# Patient Record
Sex: FEMALE | Race: BLACK OR AFRICAN AMERICAN | NOT HISPANIC OR LATINO | Employment: OTHER | URBAN - METROPOLITAN AREA
[De-identification: names, ages, dates, MRNs, and addresses within clinical notes are randomized per-mention and may not be internally consistent; named-entity substitution may affect disease eponyms.]

---

## 2017-07-04 LAB
ALBUMIN SERPL-MCNC: 3.9 G/DL (ref 3.5–5.5)
ALBUMIN/GLOB SERPL: 1.3 {RATIO} (ref 1.2–2.2)
ALP SERPL-CCNC: 82 IU/L (ref 39–117)
ALT SERPL-CCNC: 19 IU/L (ref 0–32)
AST SERPL-CCNC: 12 IU/L (ref 0–40)
BILIRUB SERPL-MCNC: 0.4 MG/DL (ref 0–1.2)
BUN SERPL-MCNC: 15 MG/DL (ref 6–24)
BUN/CREAT SERPL: 13 (ref 9–23)
CALCIUM SERPL-MCNC: 9.2 MG/DL (ref 8.7–10.2)
CHLORIDE SERPL-SCNC: 92 MMOL/L (ref 96–106)
CHOLEST SERPL-MCNC: 134 MG/DL (ref 100–199)
CO2 SERPL-SCNC: 26 MMOL/L (ref 18–29)
COMMENT 011824: ABNORMAL
CREAT SERPL-MCNC: 1.16 MG/DL (ref 0.57–1)
GLOBULIN SER CALC-MCNC: 3 G/DL (ref 1.5–4.5)
GLUCOSE SERPL-MCNC: 421 MG/DL (ref 65–99)
HDLC SERPL-MCNC: 39 MG/DL
LDLC SERPL CALC-MCNC: 56 MG/DL (ref 0–99)
POTASSIUM SERPL-SCNC: 4.1 MMOL/L (ref 3.5–5.2)
PROT SERPL-MCNC: 6.9 G/DL (ref 6–8.5)
SODIUM SERPL-SCNC: 136 MMOL/L (ref 134–144)
TRIGL SERPL-MCNC: 193 MG/DL (ref 0–149)
VLDLC SERPL CALC-MCNC: 39 MG/DL (ref 5–40)

## 2017-07-24 ENCOUNTER — TELEPHONE (OUTPATIENT)
Dept: CARDIOLOGY | Facility: MEDICAL CENTER | Age: 57
End: 2017-07-24

## 2017-07-24 NOTE — TELEPHONE ENCOUNTER
----- Message from Ramsey Gill M.D. sent at 7/24/2017 10:24 AM PDT -----  Please make sure whoever is managing the patient's glucose nose about the elevation.

## 2017-07-24 NOTE — TELEPHONE ENCOUNTER
Left patient a voicemail with instructions to call the office for lab results (CMP/Lipid Profile 7/3/2017).    JV RN

## 2017-07-24 NOTE — TELEPHONE ENCOUNTER
Received a call back from the patient, advised her of lab results and Dr. Gill's note. Patient states that her diabetes is being managed by her PCP (Paloma LEHMAN).     Lab report faxed to the patient's PCP's office at fax # [phone].    JV AYOUB

## 2017-08-22 ENCOUNTER — OFFICE VISIT (OUTPATIENT)
Dept: CARDIOLOGY | Facility: MEDICAL CENTER | Age: 57
End: 2017-08-22
Payer: MEDICARE

## 2017-08-22 VITALS
OXYGEN SATURATION: 95 % | DIASTOLIC BLOOD PRESSURE: 72 MMHG | BODY MASS INDEX: 43.63 KG/M2 | SYSTOLIC BLOOD PRESSURE: 112 MMHG | HEART RATE: 74 BPM | WEIGHT: 278 LBS | HEIGHT: 67 IN

## 2017-08-22 DIAGNOSIS — E78.5 DYSLIPIDEMIA: ICD-10-CM

## 2017-08-22 DIAGNOSIS — I10 ESSENTIAL HYPERTENSION: ICD-10-CM

## 2017-08-22 DIAGNOSIS — R00.2 PALPITATIONS: ICD-10-CM

## 2017-08-22 DIAGNOSIS — R06.83 SNORES: ICD-10-CM

## 2017-08-22 PROCEDURE — 99214 OFFICE O/P EST MOD 30 MIN: CPT | Performed by: INTERNAL MEDICINE

## 2017-08-22 RX ORDER — GLIPIZIDE 10 MG/1
10 TABLET ORAL DAILY
COMMUNITY

## 2017-08-22 NOTE — Clinical Note
[facility]   [address], [facility] [address]  [place] [address]  Phone: [phone]  Fax: [phone]              Asha Younger  1960    Encounter Date: 8/22/2017    Ramsey Gill M.D.          PROGRESS NOTE:  Subjective:   Asha Younger is a 56 y.o. female who presents today for followup for hypertension and edema. She also has hyperlipidemia and a history of a hypercoagulable state. She is on chronic anticoagulation therapy.    Her blood pressure is under better control. At home it is running approximately 120/70-80.     She's had no chest discomfort, dyspnea on exertion or palpitations. She does have some difficulty with fatigue and does snore intermittently. She has had some edema which is predominantly dependent nature. She's had no recurrent palpitations.    Past Medical History   Diagnosis Date   • Hypercoagulable state (CMS-HCC) 10/11/2011   • DVT (deep vein thrombosis) in pregnancy 10/11/2011   • Diabetes mellitus type 2 in obese (CMS-HCC) 10/11/2011   • HTN (hypertension) 10/11/2011   • GERD (gastroesophageal reflux disease) 10/11/2011   • Edema 10/11/2011   • DVT (deep venous thrombosis) (CMS-HCC) 10/11/2011   • Palpitations 12/21/2011   • Hyperlipemia 1/22/2013     Past Surgical History   Procedure Laterality Date   • Oophorectomy  1984     RIGHT   • Ventral hernia repair       Family History   Problem Relation Age of Onset   • Clotting Disorder Mother    • Clotting Disorder Sister      History   Smoking status   • Current Every Day Smoker -- 0.25 packs/day for 20 years   • Types: Cigarettes   Smokeless tobacco   • Never Used     Allergies   Allergen Reactions   • Erythromycin      Outpatient Encounter Prescriptions as of 8/22/2017   Medication Sig Dispense Refill   • glipiZIDE (GLUCOTROL) 10 MG Tab Take 10 mg by mouth every day.     • Coenzyme Q10 (CO Q 10 PO) Take  by mouth.     • carvedilol (COREG) 25 MG Tab Take 2 Tabs by mouth 2 times a  "day, with meals. 120 Tab 11   • atorvastatin (LIPITOR) 20 MG TABS Take 1 Tab by mouth every evening. 90 Tab 1   • hydrochlorothiazide (HYDRODIURIL) 50 MG TABS Take 50 mg by mouth every day.     • potassium chloride SA (K-DUR) 20 MEQ TBCR Take 20 mEq by mouth as needed.     • fluticasone (FLONASE) 50 MCG/ACT nasal spray Spray 1 Spray in nose every day. Each Nostril     • albuterol (PROAIR HFA) 108 (90 BASE) MCG/ACT AERS Inhale 2 Puffs by mouth every 6 hours as needed.     • warfarin (COUMADIN) 4 MG TABS Take 4 mg by mouth every day.     • hydrocodone-acetaminophen (NORCO) 5-325 MG TABS per tablet Take 1-2 Tabs by mouth every four hours as needed.     • furosemide (LASIX) 40 MG TABS Take 1 Tab by mouth as needed. 10 Tab 3   • sitagliptin (JANUVIA) 100 MG TABS Take 100 mg by mouth every day.     • magnesium oxide (MAG-OX) 400 MG TABS Take 400 mg by mouth 2 times a day.     • metformin SR (GLUCOPHAGE XR) 500 MG TB24 Take 500 mg by mouth 2 times a day.     • lisinopril (PRINIVIL) 20 MG TABS Take 40 mg by mouth every day.       No facility-administered encounter medications on file as of 8/22/2017.     ROS       Objective:   /72 mmHg  Pulse 74  Ht 1.702 m (5' 7\")  Wt 126.1 kg (278 lb)  BMI 43.53 kg/m2  SpO2 95%    Physical Exam   Neck: No JVD present.   Cardiovascular: Normal rate.  An irregular rhythm present. Exam reveals no gallop.    No murmur heard.  Pulmonary/Chest: Effort normal. She has no rales.   Abdominal: Soft. There is no tenderness.   Musculoskeletal: She exhibits edema (trace pretibial).     Lab Results   Component Value Date/Time    CHOLESTEROL, 07/03/2017 08:34 AM    LDL 56 07/03/2017 08:34 AM    HDL 39* 07/03/2017 08:34 AM    TRIGLYCERIDES 193* 07/03/2017 08:34 AM       Lab Results   Component Value Date/Time    SODIUM 136 07/03/2017 08:34 AM    POTASSIUM 4.1 07/03/2017 08:34 AM    CHLORIDE 92* 07/03/2017 08:34 AM    CO2 26 07/03/2017 08:34 AM    GLUCOSE 421* 07/03/2017 08:34 AM    BUN " 15 07/03/2017 08:34 AM    CREATININE 1.16* 07/03/2017 08:34 AM    BUN-CREATININE RATIO 13 07/03/2017 08:34 AM     Lab Results   Component Value Date/Time    ALKALINE PHOSPHATASE 82 07/03/2017 08:34 AM    AST(SGOT) 12 07/03/2017 08:34 AM    ALT(SGPT) 19 07/03/2017 08:34 AM    TOTAL BILIRUBIN 0.4 07/03/2017 08:34 AM      No results found for: BNPBTYPENAT       Assessment:     1. Essential hypertension     2. Palpitations     3. Dyslipidemia         Medical Decision Making:  Today's Assessment / Status / Plan:     Ms. Younger is clinically stable. Her blood pressure appears to be under excellent control. Her lipid status is also under good control on a moderate dose of atorvastatin. We did try to increase this to 40 mg but this caused muscle cramping. We will continue her on her current dosage. She's had a difficulty with palpitations. Given her fatigue and snoring, we will further evaluate her with an overnight pulse oximetry to rule out nocturnal hypoxemia. She will otherwise follow-up in about 6 months.      Dorota Ball, A.P.N.  [address] [facility] [address]  VIA Facsimile: [phone]

## 2017-08-22 NOTE — PROGRESS NOTES
Subjective:   Asha Younger is a 56 y.o. female who presents today for followup for hypertension and edema. She also has hyperlipidemia and a history of a hypercoagulable state. She is on chronic anticoagulation therapy.    Her blood pressure is under better control. At home it is running approximately 120/70-80.     She's had no chest discomfort, dyspnea on exertion or palpitations. She does have some difficulty with fatigue and does snore intermittently. She has had some edema which is predominantly dependent nature. She's had no recurrent palpitations.    Past Medical History   Diagnosis Date   • Hypercoagulable state (CMS-HCC) 10/11/2011   • DVT (deep vein thrombosis) in pregnancy 10/11/2011   • Diabetes mellitus type 2 in obese (CMS-HCC) 10/11/2011   • HTN (hypertension) 10/11/2011   • GERD (gastroesophageal reflux disease) 10/11/2011   • Edema 10/11/2011   • DVT (deep venous thrombosis) (CMS-HCC) 10/11/2011   • Palpitations 12/21/2011   • Hyperlipemia 1/22/2013     Past Surgical History   Procedure Laterality Date   • Oophorectomy  1984     RIGHT   • Ventral hernia repair       Family History   Problem Relation Age of Onset   • Clotting Disorder Mother    • Clotting Disorder Sister      History   Smoking status   • Current Every Day Smoker -- 0.25 packs/day for 20 years   • Types: Cigarettes   Smokeless tobacco   • Never Used     Allergies   Allergen Reactions   • Erythromycin      Outpatient Encounter Prescriptions as of 8/22/2017   Medication Sig Dispense Refill   • glipiZIDE (GLUCOTROL) 10 MG Tab Take 10 mg by mouth every day.     • Coenzyme Q10 (CO Q 10 PO) Take  by mouth.     • carvedilol (COREG) 25 MG Tab Take 2 Tabs by mouth 2 times a day, with meals. 120 Tab 11   • atorvastatin (LIPITOR) 20 MG TABS Take 1 Tab by mouth every evening. 90 Tab 1   • hydrochlorothiazide (HYDRODIURIL) 50 MG TABS Take 50 mg by mouth every day.     • potassium chloride SA (K-DUR) 20 MEQ TBCR Take 20 mEq by mouth as  "needed.     • fluticasone (FLONASE) 50 MCG/ACT nasal spray Spray 1 Spray in nose every day. Each Nostril     • albuterol (PROAIR HFA) 108 (90 BASE) MCG/ACT AERS Inhale 2 Puffs by mouth every 6 hours as needed.     • warfarin (COUMADIN) 4 MG TABS Take 4 mg by mouth every day.     • hydrocodone-acetaminophen (NORCO) 5-325 MG TABS per tablet Take 1-2 Tabs by mouth every four hours as needed.     • furosemide (LASIX) 40 MG TABS Take 1 Tab by mouth as needed. 10 Tab 3   • sitagliptin (JANUVIA) 100 MG TABS Take 100 mg by mouth every day.     • magnesium oxide (MAG-OX) 400 MG TABS Take 400 mg by mouth 2 times a day.     • metformin SR (GLUCOPHAGE XR) 500 MG TB24 Take 500 mg by mouth 2 times a day.     • lisinopril (PRINIVIL) 20 MG TABS Take 40 mg by mouth every day.       No facility-administered encounter medications on file as of 8/22/2017.     ROS       Objective:   /72 mmHg  Pulse 74  Ht 1.702 m (5' 7\")  Wt 126.1 kg (278 lb)  BMI 43.53 kg/m2  SpO2 95%    Physical Exam   Neck: No JVD present.   Cardiovascular: Normal rate.  An irregular rhythm present. Exam reveals no gallop.    No murmur heard.  Pulmonary/Chest: Effort normal. She has no rales.   Abdominal: Soft. There is no tenderness.   Musculoskeletal: She exhibits edema (trace pretibial).     Lab Results   Component Value Date/Time    CHOLESTEROL, 07/03/2017 08:34 AM    LDL 56 07/03/2017 08:34 AM    HDL 39* 07/03/2017 08:34 AM    TRIGLYCERIDES 193* 07/03/2017 08:34 AM       Lab Results   Component Value Date/Time    SODIUM 136 07/03/2017 08:34 AM    POTASSIUM 4.1 07/03/2017 08:34 AM    CHLORIDE 92* 07/03/2017 08:34 AM    CO2 26 07/03/2017 08:34 AM    GLUCOSE 421* 07/03/2017 08:34 AM    BUN 15 07/03/2017 08:34 AM    CREATININE 1.16* 07/03/2017 08:34 AM    BUN-CREATININE RATIO 13 07/03/2017 08:34 AM     Lab Results   Component Value Date/Time    ALKALINE PHOSPHATASE 82 07/03/2017 08:34 AM    AST(SGOT) 12 07/03/2017 08:34 AM    ALT(SGPT) 19 " 07/03/2017 08:34 AM    TOTAL BILIRUBIN 0.4 07/03/2017 08:34 AM      No results found for: BNPBTYPENAT       Assessment:     1. Essential hypertension     2. Palpitations     3. Dyslipidemia         Medical Decision Making:  Today's Assessment / Status / Plan:     Ms. Younger is clinically stable. Her blood pressure appears to be under excellent control. Her lipid status is also under good control on a moderate dose of atorvastatin. We did try to increase this to 40 mg but this caused muscle cramping. We will continue her on her current dosage. She's had a difficulty with palpitations. Given her fatigue and snoring, we will further evaluate her with an overnight pulse oximetry to rule out nocturnal hypoxemia. She will otherwise follow-up in about 6 months.

## 2017-08-22 NOTE — MR AVS SNAPSHOT
"        Asha Younger   2017 2:45 PM   Office Visit   MRN: [id number]    Department:  [facility]   Dept Phone:  [phone]    Description:  Female : 1960   Provider:  Ramsey Gill M.D.           Reason for Visit     Follow-Up           Allergies as of 2017     Allergen Noted Reactions    Erythromycin 10/11/2011         You were diagnosed with     Essential hypertension   [8247057]       Palpitations   [785.1.ICD-9-CM]       Dyslipidemia   [060507]       Snores   [059871]         Vital Signs     Blood Pressure Pulse Height Weight Body Mass Index Oxygen Saturation    112/72 mmHg 74 1.702 m (5' 7\") 126.1 kg (278 lb) 43.53 kg/m2 95%    Smoking Status                   Current Every Day Smoker           Basic Information     Date Of Birth Sex Race Ethnicity Preferred Language    1960 Female Black or  Non- English      Problem List              ICD-10-CM Priority Class Noted - Resolved    Hypercoagulable state (CMS-HCC) D68.59   10/11/2011 - Present    Diabetes mellitus type 2 in obese (CMS-HCC) E11.9, E66.9   10/11/2011 - Present    Essential hypertension I10   10/11/2011 - Present    GERD (gastroesophageal reflux disease) K21.9   10/11/2011 - Present    Edema R60.9   10/11/2011 - Present    DVT (deep venous thrombosis) (CMS-HCC) I82.409   10/11/2011 - Present    Palpitations R00.2   2011 - Present    Dyslipidemia E78.5   2013 - Present      Health Maintenance        Date Due Completion Dates    IMM HEP B VACCINE (1 of 3 - Primary Series) 1960 ---    A1C SCREENING 1960 ---    DIABETES MONOFILAMENT / LE EXAM 1960 ---    RETINAL SCREENING 1977 ---    URINE ACR / MICROALBUMIN 1977 ---    IMM DTaP/Tdap/Td Vaccine (1 - Tdap) 1978 ---    IMM PNEUMOCOCCAL 19-64 (ADULT) MEDIUM RISK SERIES (1 of 1 - PPSV23) 1978 ---    PAP SMEAR 1980 ---    MAMMOGRAM 1999 ---    COLONOSCOPY 2009 ---    IMM INFLUENZA (1) " 9/1/2017 ---    FASTING LIPID PROFILE 7/3/2018 7/3/2017, 7/1/2016, 5/9/2015, 12/31/2014, 2/4/2014, 1/8/2013    SERUM CREATININE 7/3/2018 7/3/2017, 7/1/2016, 5/9/2015, 12/31/2014, 4/20/2014, 2/4/2014, 1/8/2013, 7/31/2007            Current Immunizations     No immunizations on file.      Below and/or attached are the medications your provider expects you to take. Review all of your home medications and newly ordered medications with your provider and/or pharmacist. Follow medication instructions as directed by your provider and/or pharmacist. Please keep your medication list with you and share with your provider. Update the information when medications are discontinued, doses are changed, or new medications (including over-the-counter products) are added; and carry medication information at all times in the event of emergency situations     Allergies:  ERYTHROMYCIN - (reactions not documented)               Medications  Valid as of: August 22, 2017 -  3:34 PM    Generic Name Brand Name Tablet Size Instructions for use    Albuterol Sulfate (Aero Soln) albuterol 108 (90 Base) MCG/ACT Inhale 2 Puffs by mouth every 6 hours as needed.        Atorvastatin Calcium (Tab) LIPITOR 20 MG Take 1 Tab by mouth every evening.        Carvedilol (Tab) COREG 25 MG Take 2 Tabs by mouth 2 times a day, with meals.        Coenzyme Q10   Take  by mouth.        Fluticasone Propionate (Suspension) FLONASE 50 MCG/ACT Spray 1 Spray in nose every day. Each Nostril        Furosemide (Tab) LASIX 40 MG Take 1 Tab by mouth as needed.        GlipiZIDE (Tab) GLUCOTROL 10 MG Take 10 mg by mouth every day.        HydroCHLOROthiazide (Tab) HYDRODIURIL 50 MG Take 50 mg by mouth every day.        Hydrocodone-Acetaminophen (Tab) NORCO 5-325 MG Take 1-2 Tabs by mouth every four hours as needed.        Lisinopril (Tab) PRINIVIL 20 MG Take 40 mg by mouth every day.        Magnesium Oxide (Tab) MAG- MG Take 400 mg by mouth 2 times a day.        MetFORMIN  HCl (TABLET SR 24 HR) GLUCOPHAGE  MG Take 500 mg by mouth 2 times a day.        Potassium Chloride Rubi CR (Tab CR) Kdur 20 MEQ Take 20 mEq by mouth as needed.        SITagliptin Phosphate (Tab) JANUVIA 100 MG Take 100 mg by mouth every day.        Warfarin Sodium (Tab) COUMADIN 4 MG Take 4 mg by mouth every day.        .                 Medicines prescribed today were sent to:     [id number] - [place], [place] - [id number] [facility] [place] [address]    Phone: [phone] Fax: [phone]    Open 24 Hours?: No      Medication refill instructions:       If your prescription bottle indicates you have medication refills left, it is not necessary to call your provider’s office. Please contact your pharmacy and they will refill your medication.    If your prescription bottle indicates you do not have any refills left, you may request refills at any time through one of the following ways: The online [employer] system (except Urgent Care), by calling your provider’s office, or by asking your pharmacy to contact your provider’s office with a refill request. Medication refills are processed only during regular business hours and may not be available until the next business day. Your provider may request additional information or to have a follow-up visit with you prior to refilling your medication.   *Please Note: Medication refills are assigned a new Rx number when refilled electronically. Your pharmacy may indicate that no refills were authorized even though a new prescription for the same medication is available at the pharmacy. Please request the medicine by name with the pharmacy before contacting your provider for a refill.           [employer] Access Code: [email or web address]  Expires: 9/21/2017  3:34 PM    [employer]  A secure, online tool to manage your health information     [employer]’s [employer]rett® is a secure, online tool that connects you to your personalized health information from the privacy  of your home -- day or night - making it very easy for you to manage your healthcare. Once the activation process is completed, you can even access your medical information using the [facility] crystal, which is available for free in the Apple Crystal store or Google Play store.     [facility] provides the following levels of access (as shown below):   My Chart Features   [facility] Doctor [facility]  Specialists [facility]  Urgent  Care Non-[facility]  Primary Care  Doctor   Email your healthcare team securely and privately 24/7 X X X    Manage appointments: schedule your next appointment; view details of past/upcoming appointments X      Request prescription refills. X      View recent personal medical records, including lab and immunizations X X X X   View health record, including health history, allergies, medications X X X X   Read reports about your outpatient visits, procedures, consult and ER notes X X X X   See your discharge summary, which is a recap of your hospital and/or ER visit that includes your diagnosis, lab results, and care plan. X X       How to register for [employer]:  1. Go to  [email or web address].  2. Click on the Sign Up Now box, which takes you to the New Member Sign Up page. You will need to provide the following information:  a. Enter your [employer] Access Code exactly as it appears at the top of this page. (You will not need to use this code after you’ve completed the sign-up process. If you do not sign up before the expiration date, you must request a new code.)   b. Enter your date of birth.   c. Enter your home email address.   d. Click Submit, and follow the next screen’s instructions.  3. Create a [facility] ID. This will be your [employer] login ID and cannot be changed, so think of one that is secure and easy to remember.  4. Create a [employer] password. You can change your password at any time.  5. Enter your Password Reset Question and Answer. This can be used at a later time if you forget your  password.   6. Enter your e-mail address. This allows you to receive e-mail notifications when new information is available in [employer].  7. Click Sign Up. You can now view your health information.    For assistance activating your [employer] account, call [phone]        Quit Tobacco Information     Do you want to quit using tobacco?    Quitting tobacco decreases risks of cancer, heart and lung disease, increases life expectancy, improves sense of taste and smell, and increases spending money, among other benefits.    If you are thinking about quitting, we can help.  • [facility] Quit Tobacco Program: 938.326.9686  o Program occurs weekly for four weeks and includes pharmacist consultation on products to support quitting smoking or chewing tobacco. A provider referral is needed for pharmacist consultation.  • Tobacco Users Help Hotline: 3-168-QUIT-NOW (893-1438) or https://[place].quitlogix.org/  o Free, confidential telephone and online coaching for [place] residents. Sessions are designed on a schedule that is convenient for you. Eligible clients receive free nicotine replacement therapy.  • Nationally: www.smokefree.gov  o Information and professional assistance to support both immediate and long-term needs as you become, and remain, a non-smoker. Smokefree.gov allows you to choose the help that best fits your needs.

## 2017-08-28 ENCOUNTER — TELEPHONE (OUTPATIENT)
Dept: CARDIOLOGY | Facility: MEDICAL CENTER | Age: 57
End: 2017-08-28

## 2017-09-15 ENCOUNTER — TELEPHONE (OUTPATIENT)
Dept: CARDIOLOGY | Facility: MEDICAL CENTER | Age: 57
End: 2017-09-15

## 2017-09-15 DIAGNOSIS — R53.83 FATIGUE, UNSPECIFIED TYPE: ICD-10-CM

## 2017-09-15 DIAGNOSIS — G47.34 NOCTURNAL HYPOXIA: Primary | ICD-10-CM

## 2017-09-15 DIAGNOSIS — R06.83 SNORING: ICD-10-CM

## 2017-09-15 NOTE — TELEPHONE ENCOUNTER
Referral to pulmonology initiated. Left patient a voicemail with instructions to call the office regarding her OPO result and Dr. Gill's recommendation.    JV AYOUB

## 2017-09-15 NOTE — TELEPHONE ENCOUNTER
Called patient again, advised her of abnormal OPO result and Dr. Gill's recommendation. Patient verbalized understanding and agrees with plan.    Will contact patient once referral is completed.    JV AYOUB

## 2017-12-19 ENCOUNTER — SLEEP CENTER VISIT (OUTPATIENT)
Dept: SLEEP MEDICINE | Facility: MEDICAL CENTER | Age: 57
End: 2017-12-19
Payer: MEDICARE

## 2017-12-19 VITALS
HEART RATE: 80 BPM | WEIGHT: 272 LBS | SYSTOLIC BLOOD PRESSURE: 132 MMHG | HEIGHT: 67 IN | OXYGEN SATURATION: 92 % | DIASTOLIC BLOOD PRESSURE: 80 MMHG | BODY MASS INDEX: 42.69 KG/M2

## 2017-12-19 DIAGNOSIS — I10 ESSENTIAL HYPERTENSION: ICD-10-CM

## 2017-12-19 DIAGNOSIS — E66.9 DIABETES MELLITUS TYPE 2 IN OBESE (HCC): ICD-10-CM

## 2017-12-19 DIAGNOSIS — R00.2 PALPITATIONS: ICD-10-CM

## 2017-12-19 DIAGNOSIS — G47.30 SLEEP DISORDER BREATHING: ICD-10-CM

## 2017-12-19 DIAGNOSIS — E11.69 DIABETES MELLITUS TYPE 2 IN OBESE (HCC): ICD-10-CM

## 2017-12-19 DIAGNOSIS — D68.59 HYPERCOAGULABLE STATE (HCC): ICD-10-CM

## 2017-12-19 DIAGNOSIS — Z71.6 TOBACCO ABUSE COUNSELING: ICD-10-CM

## 2017-12-19 PROCEDURE — 99204 OFFICE O/P NEW MOD 45 MIN: CPT | Performed by: FAMILY MEDICINE

## 2017-12-19 RX ORDER — HYDROCHLOROTHIAZIDE 25 MG/1
25 TABLET ORAL 2 TIMES DAILY
COMMUNITY
Start: 2017-10-16

## 2017-12-19 RX ORDER — ATORVASTATIN CALCIUM 10 MG/1
TABLET, FILM COATED ORAL
COMMUNITY
Start: 2017-10-16 | End: 2018-05-21

## 2017-12-19 RX ORDER — LISINOPRIL 40 MG/1
40 TABLET ORAL DAILY
COMMUNITY
Start: 2017-09-21

## 2017-12-19 RX ORDER — WARFARIN SODIUM 5 MG/1
TABLET ORAL
COMMUNITY
Start: 2017-12-16 | End: 2019-04-20

## 2017-12-19 RX ORDER — OMEPRAZOLE 20 MG/1
20 CAPSULE, DELAYED RELEASE ORAL
COMMUNITY
Start: 2017-12-15 | End: 2019-03-26

## 2017-12-19 RX ORDER — PREDNISONE 20 MG/1
TABLET ORAL
COMMUNITY
Start: 2017-10-31 | End: 2018-05-22

## 2017-12-19 RX ORDER — AMOXICILLIN AND CLAVULANATE POTASSIUM 875; 125 MG/1; MG/1
TABLET, FILM COATED ORAL
COMMUNITY
Start: 2017-12-15 | End: 2018-05-22

## 2017-12-19 RX ORDER — POLYETHYLENE GLYCOL 3350 17 G/17G
POWDER, FOR SOLUTION ORAL
COMMUNITY
Start: 2017-10-31 | End: 2019-03-26

## 2017-12-19 RX ORDER — ZOLPIDEM TARTRATE 5 MG/1
5 TABLET ORAL NIGHTLY PRN
Qty: 2 TAB | Refills: 0 | Status: SHIPPED | OUTPATIENT
Start: 2017-12-19 | End: 2017-12-20

## 2017-12-19 NOTE — PROGRESS NOTES
"     [facility]  Consult Note     Date: 12/19/2017 / Time: 1:42 PM    Patient ID:   Name:             Asha Younger   YOB: 1960  Age:                 57 y.o.  female   MRN:               [id number]      Thank you for requesting a sleep medicine consultation on Asha Younger at the sleep center. She presents today with the chief complaints of snore and  occasional excessive daytime sleepiness (EDS). She is referred by Dr. Sanchez for evaluation and treatment of sleep disorder breathing.     HISTORY OF PRESENT ILLNESS:       At night,  Asha Younger goes to bed around 9-10 pm on weekdays and around on weekends. She gets out of bed at 5-7 am on weekdays and on weekends.She  averages 6.5 hrs of sleep on a good night and 4 hrs on a bad night. Pt has bad nights 2-3 nights per week. She falls asleep within 20 minutes. She awakens 2 times a night due to bathroom use. It takes her 5-10 min to fall back asleep.Some times it can take 1-2 hours but it is rear. During that time she watches TV or read.     She is aware of snoring denies witnessed apneas/gasping or choking in sleep.  She  denies any symptoms of restless legs syndrome such as an \"urge to move\"  She  legs in the evening or bedtime. She  denies any symptoms of narcolepsy such as sleep paralysis or cataplexy, or any symptoms to suggest parasomnias such as sleep walking or acting out of dreams. She  has not used any medications for her sleep problem.    Overall,  She doesnot finds her sleep refreshing. When She  wakes up in the morning, She  does does feel tired.Steele sleepiness scale score is normal at  2/24.   She doesnot take regular naps.     She is current smoker. She smoked 5 cigarettes per day for last 25 years. Denies COPD and Asthma . She uses albuterol inh PRN.   REVIEW OF SYSTEMS:       Constitutional: Denies fevers, Denies weight changes  Eyes: Denies changes in vision, no eye pain  Ears/Nose/Throat/Mouth: cough, " congestion, rhinorrhea  Cardiovascular: Denies chest pain or palpitations   Respiratory: Denies shortness of breath , Denies cough  Gastrointestinal/Hepatic: Denies abdominal pain, nausea, vomiting, diarrhea, constipation or GI bleeding   Genitourinary: Denies bladder dysfunction, dysuria or frequency  Musculoskeletal/Rheum: Denies  joint pain and swelling   Skin/Breast: Denies rash, denies breast lumps or discharge  Neurological: Denies headache, confusion, memory loss or focal weakness/parasthesias  Psychiatric: denies mood disorder     Comprehensive review of systems form is reviewed with the patient and is attached in the EMR.     PMH:  has a past medical history of Bronchitis; Diabetes mellitus type 2 in obese (CMS-HCC) (10/11/2011); DVT (deep vein thrombosis) in pregnancy (CMS-HCC) (10/11/2011); DVT (deep venous thrombosis) (CMS-HCC) (10/11/2011); Edema (10/11/2011); GERD (gastroesophageal reflux disease) (10/11/2011); [place] measles; HTN (hypertension) (10/11/2011); Hypercoagulable state (CMS-HCC) (10/11/2011); Hyperlipemia (1/22/2013); Hypertension; Nasal drainage; Obesity; Palpitations (12/21/2011); and Sickle cell trait (CMS-HCC).  MEDS:   Current Outpatient Prescriptions:   •  warfarin (COUMADIN) 5 MG Tab, , Disp: , Rfl:   •  omeprazole (PRILOSEC) 20 MG delayed-release capsule, , Disp: , Rfl:   •  amoxicillin-clavulanate (AUGMENTIN) 875-125 MG Tab, , Disp: , Rfl:   •  predniSONE (DELTASONE) 20 MG Tab, , Disp: , Rfl:   •  polyethylene glycol 3350 (MIRALAX) Powder, , Disp: , Rfl:   •  metformin (GLUCOPHAGE) 1000 MG tablet, , Disp: , Rfl:   •  hydrochlorothiazide (HYDRODIURIL) 25 MG Tab, , Disp: , Rfl:   •  atorvastatin (LIPITOR) 10 MG Tab, , Disp: , Rfl:   •  lisinopril (PRINIVIL, ZESTRIL) 40 MG tablet, , Disp: , Rfl:   •  CHERATUSSIN AC Syrup oral solution, TAKE 10ML BY MOUTH EVERY 4 HOURS NOT COVERED, Disp: , Rfl: 0  •  glipiZIDE (GLUCOTROL) 10 MG Tab, Take 10 mg by mouth every day., Disp: , Rfl:   •   Coenzyme Q10 (CO Q 10 PO), Take  by mouth., Disp: , Rfl:   •  carvedilol (COREG) 25 MG Tab, Take 2 Tabs by mouth 2 times a day, with meals., Disp: 120 Tab, Rfl: 11  •  atorvastatin (LIPITOR) 20 MG TABS, Take 1 Tab by mouth every evening., Disp: 90 Tab, Rfl: 1  •  hydrochlorothiazide (HYDRODIURIL) 50 MG TABS, Take 50 mg by mouth every day., Disp: , Rfl:   •  potassium chloride SA (K-DUR) 20 MEQ TBCR, Take 20 mEq by mouth as needed., Disp: , Rfl:   •  fluticasone (FLONASE) 50 MCG/ACT nasal spray, Spray 1 Spray in nose every day. Each Nostril, Disp: , Rfl:   •  albuterol (PROAIR HFA) 108 (90 BASE) MCG/ACT AERS, Inhale 2 Puffs by mouth every 6 hours as needed., Disp: , Rfl:   •  warfarin (COUMADIN) 4 MG TABS, Take 4 mg by mouth every day., Disp: , Rfl:   •  hydrocodone-acetaminophen (NORCO) 5-325 MG TABS per tablet, Take 1-2 Tabs by mouth every four hours as needed., Disp: , Rfl:   •  furosemide (LASIX) 40 MG TABS, Take 1 Tab by mouth as needed., Disp: 10 Tab, Rfl: 3  •  sitagliptin (JANUVIA) 100 MG TABS, Take 100 mg by mouth every day., Disp: , Rfl:   •  magnesium oxide (MAG-OX) 400 MG TABS, Take 400 mg by mouth 2 times a day., Disp: , Rfl:   •  metformin SR (GLUCOPHAGE XR) 500 MG TB24, Take 500 mg by mouth 2 times a day., Disp: , Rfl:   •  lisinopril (PRINIVIL) 20 MG TABS, Take 40 mg by mouth every day., Disp: , Rfl:   ALLERGIES:   Allergies   Allergen Reactions   • Erythromycin      SURGHX:   Past Surgical History:   Procedure Laterality Date   • OOPHORECTOMY  1984    RIGHT   • LAPAROTOMY     • VENTRAL HERNIA REPAIR       SOCHX:  reports that she has been smoking Cigarettes.  She has a 5.00 pack-year smoking history. She has never used smokeless tobacco. She reports that she does not drink alcohol or use drugs..   FH:   Family History   Problem Relation Age of Onset   • Clotting Disorder Mother    • Kidney Disease Mother    • Clotting Disorder Sister    • Cancer Father    • Heart Failure Father            Physical  "Exam:  Vitals/ General Appearance:   Weight/BMI: Body mass index is 42.6 kg/m².  Blood pressure 132/80, pulse 80, height 1.702 m (5' 7\"), weight 123.4 kg (272 lb), SpO2 92 %.  Vitals:    12/19/17 1333   BP: 132/80   Pulse: 80   SpO2: 92%   Weight: 123.4 kg (272 lb)   Height: 1.702 m (5' 7\")       Pt. is alert and oriented to time, place and person. Cooperative and in no apparent distress.       1. Head: Atraumatic, normocephalic. There is no mandibular hypoplasia or maxillary over-jut.   2. Ears: Normal tympanic membrane and no discharge  3. Nose: No inferior turbinate hypertophy, no septal deviation, no polyp.   4. Throat: Oropharynx appears crowded in that the palate is overhanging (Malam Rosa Isela scale 4. Tonsils are not enlarged, uvula is large, pharynx not inflamed. Tongue is large. She has intact dentition and oral hygiene is fair.  5. Neck: Supple. No thyromegaly  6. Chest: Trachea central, no spine deformity   7. Lungs auscultation: B/L good air entry, mild BLT wheezing   8. Heart auscultation: 1st and 2nd heart sounds normal, regular rhythm. No appreciable murmur.  9. Abdomen: Soft, non tender, no organomegaly. Bowel sounds present  10. Extremities: No, no deformity, no clubbing, no pedal edema.   Peripheral pulses felt.  11. Skin: No rash  12. NEUROLOGICAL EXAMINATION: On neurological exam, the patient was alert and oriented x3. speech was clear and fluent without dysarthria.  Cranial nerve exam II through XII was normal. Motor exam revealed normal bulk, tone and strength 5/5, which was symmetrical in the upper and lower extremities bilaterally.  Deep tendon reflexes are 2+ and symmetric throughout.    INVESTIGATIONS:       ASSESSMENT AND PLAN     1. She  has symptoms of Obstructive Sleep Apnea (MISAEL). She  has excessive daytime sleepiness that interferes with activites of daily living. She  risk factors for MISAEL include obesity, thick neck, and crowded oropharynx.     The pathophysiology of MISAEL and the " increased risk of cardiovascular morbidity from untreated MISAEL is discussed in detail with the patient. She  also has HTN, DM heart and palpitatiosn which can be worsened by her MISAEL.     We have discussed diagnostic options including in-laboratory, attended polysomnography and home sleep testing. We have also discussed treatment options including airway pressurization, reconstructive otolaryngologic surgery, dental appliances and weight management.     Subsequently,treatment options will be discussed based on the diagnostic study. Meanwhile, She is urged to avoid supine sleep, weight gain and alcoholic beverages since all of these can worsen MISAEL. She is cautioned against drowsy driving. If She feels sleepy while driving, She must pull over for a break/nap, rather than persist on the road, in the interest of She own safety and that of others on the road.    Plan  - She  will be scheduled for an overnight PSG to assess sleep related breathing disorder.    2. Smoking Cessation: she is already slowly weaning off number of cigarettes that she smokes a day, She has tried nicotine gum in past. Treatment options including medication, nicotine patches and quit help line was discussed with the pt. She deferred help at this moment. She doesn't have a set quit date. 10 mins was spent discussing treatment options and counseling      3. Regarding treatment of other past medical problems and general health maintenance,  She is urged to follow up with PCP.    F/U after PSG

## 2018-02-10 ENCOUNTER — SLEEP STUDY (OUTPATIENT)
Dept: SLEEP MEDICINE | Facility: MEDICAL CENTER | Age: 58
End: 2018-02-10
Attending: FAMILY MEDICINE
Payer: MEDICARE

## 2018-02-10 DIAGNOSIS — D68.59 HYPERCOAGULABLE STATE (HCC): ICD-10-CM

## 2018-02-10 DIAGNOSIS — R00.2 PALPITATIONS: ICD-10-CM

## 2018-02-10 DIAGNOSIS — G47.30 SLEEP DISORDER BREATHING: ICD-10-CM

## 2018-02-10 PROCEDURE — 95811 POLYSOM 6/>YRS CPAP 4/> PARM: CPT | Performed by: INTERNAL MEDICINE

## 2018-02-12 NOTE — PROCEDURES
CLINICAL COMMENTS:  The patient underwent a split night polysomnogram with a CPAP titration using the standard montage for measurement of parameters of sleep, respiratory events, movement abnormalities, heart rate and rhythm. A microphone was used to monitor snoring.    ANALYSIS: Testing began at 10:15 PM.  The diagnostic recording time was 175.0 minutes with a sleep period of 136.5 minutes.  Total sleep time was 135.0 minutes with a sleep efficiency of 77.1%.  The sleep latency was 38.5 minutes, and REM latency was 58.5 minutes.  The patient had 12 arousals in total, for an arousal index of 5.3.        RESPIRATORY: The patient had 2 apneas in total.  Of these, 2 were obstructive apneas, and 0 were central apneas.  This resulted in an apnea index (AI) of 0.9.  The patient had 32 hypopneas in total, which resulted in a hypopnea index of 14.2.  The overall AHI was 15.1, while the AHI during REM was 80.0.  The supine AHI = 15.1.    OXIMETRY: Oxygen saturation monitoring showed a mean SpO2 of 87.5% for the diagnostic part of the study, with a minimum oxygen saturation of 75.0%.  Oxygen saturations were below 89% for 122.3% of sleep time.    CARDIAC: The highest heart rate for the first part of the study was 83.0 beats per minute.  The average heart rate during sleep was 64.9 bpm, while the highest heart rate was 77.0 bpm.    LIMB MOVEMENTS: There were a total of 1 periodic limb movements during sleep, of which 0 were PLMS arousals.  This resulted in a PLMS index of 0.4 and a PLMS arousal index of 0.0.    TREATMENT    Treatment recording time was 265.7 minutes with a total sleep time of 238.0min.  The patient had an arousal index of 16.4.      RESPIRATORY: The patient had 0 obstructive apneas, 4 central apneas, and 41 hypopneas for an overall AHI was 11.3.    OXIMETRY: The mean SpO2 during treatment was 87.2%, with a minimum oxygen saturation of 76.0%.        Interpretation:    Ms. Younger presented with snoring and  excessive daytime somnolence. This is a split-night study.    In the diagnostic phase there is good continuity of sleep although sleep onset latency is a bit prolonged. Some REM sleep time was included. There are no periodic limb movements that interfere with sleep continuity. The apnea hypopnea index is 15.1 events per hour, primarily including a hypopnea episodes with rare obstructive apneas. The AHI climbs to 80 events per hour in rem sleep and the entire diagnostic study was done in the supine body position. The lowest arterial oxygen saturation was 75% on room air and she spent 87% of the diagnostic time with a saturation below 90%. The heart rate varied from 59-83 bpm.    In the treatment phase of the study there is mild fragmentation of sleep with an increased arousal and awakening index but significant REM sleep time was included. CPAP was adjusted across a pressure range of 6-15 cm water with persistence of hypopnea episodes and rare central apneas. BiPAP was applied at 13-18/9-13 cm water pressure. The apnea hypopnea index was zero throughout the BiPAP titration but no REM sleep was obtained. The mean arterial oxygen saturation was about 87% with a minimum saturation of 84% during the BiPAP titration.    Assessment:  Mild obstructive sleep apnea hypopnea with an apnea hypopnea index of 15.1 events per hour with events occurring more frequently in rem sleep. Severe nocturnal hypoxemia with a lowest arterial oxygen saturation of 75% on room air. The degree of hypoxemia seems out of proportion to the number of sleep breathing events and raises the possibility of underlying cardiac or pulmonary impairment, or a hypoventilation syndrome. There was a reasonable response to airway pressurization but she seemed to do better with BiPAP than with CPAP therapy. The BiPAP titration was limited by the absence of any REM sleep time or supine body position time.    Recommendations:  Auto titrating BiPAP with a somewhat  higher expiratory pressure would be reasonable. An expiratory pressure of 12-16 cm water with pressure support of 4 cm water may be useful to limit hypopnea events in supine and REM sleep. Alternatively, follow up polysomnography dedicated to further titration of therapy might be considered. There was persisting hypoxemia on optimal BiPAP therapy and options include the addition of supplemental oxygen at 2-3 L/m versus reassessment of nocturnal oxygen saturation by continuous oximetry after the patient has adjusted to BiPAP therapy. She did best with a large P10 mask.

## 2018-02-15 ENCOUNTER — SLEEP CENTER VISIT (OUTPATIENT)
Dept: SLEEP MEDICINE | Facility: MEDICAL CENTER | Age: 58
End: 2018-02-15
Payer: MEDICARE

## 2018-02-15 VITALS
DIASTOLIC BLOOD PRESSURE: 80 MMHG | WEIGHT: 276 LBS | HEART RATE: 83 BPM | OXYGEN SATURATION: 94 % | TEMPERATURE: 97.9 F | RESPIRATION RATE: 16 BRPM | HEIGHT: 67 IN | SYSTOLIC BLOOD PRESSURE: 130 MMHG | BODY MASS INDEX: 43.32 KG/M2

## 2018-02-15 DIAGNOSIS — D68.59 HYPERCOAGULABLE STATE (HCC): ICD-10-CM

## 2018-02-15 DIAGNOSIS — E66.9 DIABETES MELLITUS TYPE 2 IN OBESE (HCC): ICD-10-CM

## 2018-02-15 DIAGNOSIS — R00.2 PALPITATIONS: ICD-10-CM

## 2018-02-15 DIAGNOSIS — G47.33 OSA (OBSTRUCTIVE SLEEP APNEA): ICD-10-CM

## 2018-02-15 DIAGNOSIS — E11.69 DIABETES MELLITUS TYPE 2 IN OBESE (HCC): ICD-10-CM

## 2018-02-15 PROCEDURE — 99213 OFFICE O/P EST LOW 20 MIN: CPT | Performed by: FAMILY MEDICINE

## 2018-02-15 NOTE — PROGRESS NOTES
[facility] Follow Up Note     Date: 2/15/2018 / Time: 10:58 AM    Patient ID:   Name:             Asha Younger   YOB: 1960  Age:                 57 y.o.  female   MRN:               [id number]      Thank you for requesting a sleep medicine consultation on Asha Younger at the sleep center. She presents today with the chief complaints of MISAEL follow up.     HISTORY OF PRESENT ILLNESS:       Pt is currently not on PAP therapy. No change is sleep schedule. She goes to sleep around 9-10 pm on weekdays and around on weekends. She gets out of bed at 5-7 am on weekdays and on weekends.She  averages 6.5 hrs of sleep on a good night and 4 hrs on a bad night. Pt has bad nights 2-3 nights per weekThe symptoms of excessive daytime, snoring and gasping continues.         SLEEP HISTORY   Split night study: moderate obstructive sleep apnea with AHI of 15/hr. Due to severity of the disease she met the split study protocol. It was a successful titration. The titration started with CPAP 5 cm and was increased to CPAP 16 cm however due to residual hypopneas and low O2 julius BiPAP was tried. BIPAP titration started at 13/9 cm and  and the highest pressure tested was BiPAP 18/13 cm. The AHI improved to 0/hr with improved O2 julius of 84% and average O2 saturation of 86 %. However the lowest best tolerated pressure was BiPAP 16/12 cm.         REVIEW OF SYSTEMS:       Constitutional: Denies fevers, Denies weight changes  Eyes: Denies changes in vision, no eye pain  Ears/Nose/Throat/Mouth: Denies nasal congestion or sore throat   Cardiovascular: Denies chest pain or palpitations   Respiratory: Denies shortness of breath , Denies cough  Gastrointestinal/Hepatic: Denies abdominal pain, nausea, vomiting, diarrhea, constipation or GI bleeding   Genitourinary: Denies bladder dysfunction, dysuria or frequency  Musculoskeletal/Rheum: Denies  joint pain and swelling   Skin/Breast: Denies rash,   Neurological:  Denies headache, confusion, memory loss or focal weakness/parasthesias  Psychiatric: denies mood disorder     Comprehensive review of systems form is reviewed with the patient and is attached in the EMR.     PMH:  has a past medical history of Bronchitis; Diabetes mellitus type 2 in obese (CMS-HCC) (10/11/2011); DVT (deep vein thrombosis) in pregnancy (CMS-HCC) (10/11/2011); DVT (deep venous thrombosis) (CMS-HCC) (10/11/2011); Edema (10/11/2011); GERD (gastroesophageal reflux disease) (10/11/2011); [place] measles; HTN (hypertension) (10/11/2011); Hypercoagulable state (CMS-HCC) (10/11/2011); Hyperlipemia (1/22/2013); Hypertension; Nasal drainage; Obesity; Palpitations (12/21/2011); and Sickle cell trait (CMS-HCC).  MEDS:   Current Outpatient Prescriptions:   •  warfarin (COUMADIN) 5 MG Tab, , Disp: , Rfl:   •  omeprazole (PRILOSEC) 20 MG delayed-release capsule, , Disp: , Rfl:   •  amoxicillin-clavulanate (AUGMENTIN) 875-125 MG Tab, , Disp: , Rfl:   •  predniSONE (DELTASONE) 20 MG Tab, , Disp: , Rfl:   •  polyethylene glycol 3350 (MIRALAX) Powder, , Disp: , Rfl:   •  metformin (GLUCOPHAGE) 1000 MG tablet, , Disp: , Rfl:   •  hydrochlorothiazide (HYDRODIURIL) 25 MG Tab, , Disp: , Rfl:   •  atorvastatin (LIPITOR) 10 MG Tab, , Disp: , Rfl:   •  lisinopril (PRINIVIL, ZESTRIL) 40 MG tablet, , Disp: , Rfl:   •  CHERATUSSIN AC Syrup oral solution, TAKE 10ML BY MOUTH EVERY 4 HOURS NOT COVERED, Disp: , Rfl: 0  •  glipiZIDE (GLUCOTROL) 10 MG Tab, Take 10 mg by mouth every day., Disp: , Rfl:   •  Coenzyme Q10 (CO Q 10 PO), Take  by mouth., Disp: , Rfl:   •  carvedilol (COREG) 25 MG Tab, Take 2 Tabs by mouth 2 times a day, with meals., Disp: 120 Tab, Rfl: 11  •  atorvastatin (LIPITOR) 20 MG TABS, Take 1 Tab by mouth every evening., Disp: 90 Tab, Rfl: 1  •  hydrochlorothiazide (HYDRODIURIL) 50 MG TABS, Take 50 mg by mouth every day., Disp: , Rfl:   •  potassium chloride SA (K-DUR) 20 MEQ TBCR, Take 20 mEq by mouth as needed.,  Disp: , Rfl:   •  fluticasone (FLONASE) 50 MCG/ACT nasal spray, Spray 1 Spray in nose every day. Each Nostril, Disp: , Rfl:   •  albuterol (PROAIR HFA) 108 (90 BASE) MCG/ACT AERS, Inhale 2 Puffs by mouth every 6 hours as needed., Disp: , Rfl:   •  warfarin (COUMADIN) 4 MG TABS, Take 4 mg by mouth every day., Disp: , Rfl:   •  hydrocodone-acetaminophen (NORCO) 5-325 MG TABS per tablet, Take 1-2 Tabs by mouth every four hours as needed., Disp: , Rfl:   •  furosemide (LASIX) 40 MG TABS, Take 1 Tab by mouth as needed., Disp: 10 Tab, Rfl: 3  •  sitagliptin (JANUVIA) 100 MG TABS, Take 100 mg by mouth every day., Disp: , Rfl:   •  magnesium oxide (MAG-OX) 400 MG TABS, Take 400 mg by mouth 2 times a day., Disp: , Rfl:   •  metformin SR (GLUCOPHAGE XR) 500 MG TB24, Take 500 mg by mouth 2 times a day., Disp: , Rfl:   •  lisinopril (PRINIVIL) 20 MG TABS, Take 40 mg by mouth every day., Disp: , Rfl:   ALLERGIES:   Allergies   Allergen Reactions   • Erythromycin      SURGHX:   Past Surgical History:   Procedure Laterality Date   • OOPHORECTOMY  1984    RIGHT   • LAPAROTOMY     • VENTRAL HERNIA REPAIR       SOCHX:  reports that she has been smoking Cigarettes.  She has a 5.00 pack-year smoking history. She has never used smokeless tobacco. She reports that she does not drink alcohol or use drugs..  FH:   Family History   Problem Relation Age of Onset   • Clotting Disorder Mother    • Kidney Disease Mother    • Clotting Disorder Sister    • Cancer Father    • Heart Failure Father          Physical Exam:  Vitals/ General Appearance:   Weight/BMI: There is no height or weight on file to calculate BMI.  There were no vitals taken for this visit.  There were no vitals filed for this visit.    Pt. is alert and oriented to time, place and person. Cooperative and in no apparent distress.       1. Head: Atraumatic, normocephalic. 2. Ears: Normal tympanic membrane and no discharge  3. Nose: No inferior turbinate hypertophy, no septal  deviation, no polyp.   4. Throat: Oropharynx appears crowded in that the palate is overhanging (Malam Rosa Isela scale 4. Tonsils are not enlarged, uvula is large, pharynx not inflamed.   5. Neck: Supple. No thyromegaly  6. Chest: Trachea central, no spine deformity   7. Lungs auscultation: B/L good air entry, mild BLT wheezing   8. Heart auscultation: 1st and 2nd heart sounds normal, regular rhythm. No appreciable murmur.  9. Abdomen: Soft, non tender, no organomegaly. Bowel sounds present  10. Extremities: no clubbing, no pedal edema.   Peripheral pulses felt.  11. Skin: No rash  12. NEUROLOGICAL EXAMINATION: On neurological exam, the patient was alert and oriented x3. speech was clear and fluent without dysarthria.      INVESTIGATIONS:           ASSESSMENT AND PLAN     1.Obstructive Sleep Apnea (MISAEL)The symptoms of excessive daytime, snoring and gasping has continued      The pathophysiology of MISAEL and the increased risk of cardiovascular morbidity from untreated MISAEL is discussed in detail with the patient.      She is urged to avoid supine sleep, weight gain and alcoholic beverages since all of these can worsen MISAEL. She is cautioned against drowsy driving. If She feels sleepy while driving, She must pull over for a break/nap, rather than persist on the road, in the interest of She own safety and that of others on the road.   Plan   - it was a suboptimal titration. Official read is not vailable however based on my interpitation the best tolerated  pressure of  Auto BiPAP IAPAP max 20 cm and EPAP min 12cm PS 4 cm is ordered today.     - overnight pulse ox on the recommended pressure   - F/u in 6 weeks to assess the efficiacy of recommended pressure    - sleep study reviewed and discussed with the pt   - compliance was reinforced     2.  Regarding treatment of other past medical problems and general health maintenance,  She is urged to follow up with PCP.

## 2018-03-26 ENCOUNTER — TELEPHONE (OUTPATIENT)
Dept: CARDIOLOGY | Facility: MEDICAL CENTER | Age: 58
End: 2018-03-26

## 2018-03-26 NOTE — TELEPHONE ENCOUNTER
is lab work necessary?   Received: Today   Message Contents   Jade Woodard R.N.   Phone Number: [phone]             LINDSAY/jacky     Pt calling to ask if lab work is necessary in preparation for 5/21 appt with FK.  Please call pt       Pt states she had labs done a few months ago at [facility]. Instructed we would get those results. Show to FK and if he would like more done before her May appt we would inform her of such. Pt states understanding and appreciative of call back.

## 2018-03-30 ENCOUNTER — TELEPHONE (OUTPATIENT)
Dept: CARDIOLOGY | Facility: MEDICAL CENTER | Age: 58
End: 2018-03-30

## 2018-03-30 DIAGNOSIS — G47.33 OSA (OBSTRUCTIVE SLEEP APNEA): ICD-10-CM

## 2018-03-30 DIAGNOSIS — I10 ESSENTIAL HYPERTENSION, BENIGN: ICD-10-CM

## 2018-03-30 DIAGNOSIS — R60.9 EDEMA, UNSPECIFIED TYPE: ICD-10-CM

## 2018-03-30 DIAGNOSIS — R00.2 PALPITATIONS: ICD-10-CM

## 2018-03-30 DIAGNOSIS — E78.5 DYSLIPIDEMIA: ICD-10-CM

## 2018-03-30 NOTE — TELEPHONE ENCOUNTER
Pt called back with labs completed. Pt informed she had a CBC done and we will need lipids and a CMP done.     Pt informed I will mail her the lab reqs to take to [facility] with her. Pt reminded about fasting for 10 hours prior to lipids.  Pt appreciative and states understanding of information.

## 2018-05-10 LAB
ALBUMIN SERPL-MCNC: 4.1 G/DL (ref 3.5–5.5)
ALBUMIN/GLOB SERPL: 1.3 {RATIO} (ref 1.2–2.2)
ALP SERPL-CCNC: 71 IU/L (ref 39–117)
ALT SERPL-CCNC: 20 IU/L (ref 0–32)
AST SERPL-CCNC: 14 IU/L (ref 0–40)
BILIRUB SERPL-MCNC: 0.3 MG/DL (ref 0–1.2)
BUN SERPL-MCNC: 10 MG/DL (ref 6–24)
BUN/CREAT SERPL: 11 (ref 9–23)
CALCIUM SERPL-MCNC: 9.3 MG/DL (ref 8.7–10.2)
CHLORIDE SERPL-SCNC: 95 MMOL/L (ref 96–106)
CHOLEST SERPL-MCNC: 145 MG/DL (ref 100–199)
CO2 SERPL-SCNC: 28 MMOL/L (ref 18–29)
CREAT SERPL-MCNC: 0.93 MG/DL (ref 0.57–1)
GFR SERPLBLD CREATININE-BSD FMLA CKD-EPI: 68 ML/MIN/1.73
GFR SERPLBLD CREATININE-BSD FMLA CKD-EPI: 78 ML/MIN/1.73
GLOBULIN SER CALC-MCNC: 3.2 G/DL (ref 1.5–4.5)
GLUCOSE SERPL-MCNC: 110 MG/DL (ref 65–99)
HDLC SERPL-MCNC: 46 MG/DL
LABORATORY COMMENT REPORT: ABNORMAL
LDLC SERPL CALC-MCNC: 69 MG/DL (ref 0–99)
POTASSIUM SERPL-SCNC: 3.8 MMOL/L (ref 3.5–5.2)
PROT SERPL-MCNC: 7.3 G/DL (ref 6–8.5)
SODIUM SERPL-SCNC: 139 MMOL/L (ref 134–144)
TRIGL SERPL-MCNC: 151 MG/DL (ref 0–149)
VLDLC SERPL CALC-MCNC: 30 MG/DL (ref 5–40)

## 2018-05-21 ENCOUNTER — HOSPITAL ENCOUNTER (OUTPATIENT)
Dept: RADIOLOGY | Facility: MEDICAL CENTER | Age: 58
End: 2018-05-21
Attending: INTERNAL MEDICINE
Payer: MEDICARE

## 2018-05-21 ENCOUNTER — OFFICE VISIT (OUTPATIENT)
Dept: CARDIOLOGY | Facility: MEDICAL CENTER | Age: 58
End: 2018-05-21
Payer: MEDICARE

## 2018-05-21 VITALS
OXYGEN SATURATION: 98 % | WEIGHT: 285 LBS | HEIGHT: 67 IN | DIASTOLIC BLOOD PRESSURE: 80 MMHG | SYSTOLIC BLOOD PRESSURE: 136 MMHG | HEART RATE: 72 BPM | BODY MASS INDEX: 44.73 KG/M2

## 2018-05-21 DIAGNOSIS — M79.89 SWELLING OF LOWER EXTREMITY: ICD-10-CM

## 2018-05-21 DIAGNOSIS — R00.2 PALPITATIONS: ICD-10-CM

## 2018-05-21 DIAGNOSIS — Z86.718 HISTORY OF RECURRENT DEEP VEIN THROMBOSIS (DVT): ICD-10-CM

## 2018-05-21 DIAGNOSIS — D68.59 HYPERCOAGULABLE STATE (HCC): ICD-10-CM

## 2018-05-21 DIAGNOSIS — I10 ESSENTIAL HYPERTENSION: ICD-10-CM

## 2018-05-21 DIAGNOSIS — E78.5 DYSLIPIDEMIA: ICD-10-CM

## 2018-05-21 PROCEDURE — 99214 OFFICE O/P EST MOD 30 MIN: CPT | Performed by: INTERNAL MEDICINE

## 2018-05-21 PROCEDURE — 93971 EXTREMITY STUDY: CPT | Mod: LT

## 2018-05-21 RX ORDER — ATORVASTATIN CALCIUM 20 MG/1
20 TABLET, FILM COATED ORAL DAILY
Qty: 30 TAB | Refills: 11 | Status: SHIPPED | OUTPATIENT
Start: 2018-05-21 | End: 2018-08-17

## 2018-05-21 NOTE — PROGRESS NOTES
Chief Complaint   Patient presents with   • HTN (Uncontrolled)     F/V: 6 MO       Subjective:   Asha Younger is a 57 y.o. female who presents today for followup for hypertension and edema. She also has hyperlipidemia and a history of a hypercoagulable state. She is on chronic anticoagulation therapy.     After last visit, she did have an overnight pulse oximetry which showed nocturnal hypoxemia.  She was then evaluated by the sleep center and is now on CPAP therapy.    Her blood pressures at home have been running about 130/80.    Her palpitations have resolved.  She has had some left lower extremity swelling over the last week.  She is noted no chest discomfort, dyspnea on exertion, PND or orthopnea.  She has had no severe lightheadedness.  She occasionally notes some lightheadedness which she states might be related to a low blood sugar.    Past Medical History:   Diagnosis Date   • Bronchitis    • Diabetes mellitus type 2 in obese ([facility]) 10/11/2011   • DVT (deep vein thrombosis) in pregnancy ([facility]) 10/11/2011   • DVT (deep venous thrombosis) ([facility]) 10/11/2011   • Edema 10/11/2011   • GERD (gastroesophageal reflux disease) 10/11/2011   • [place] measles    • HTN (hypertension) 10/11/2011   • Hypercoagulable state ([facility]) 10/11/2011   • Hyperlipemia 1/22/2013   • Hypertension    • Nasal drainage    • Obesity    • Palpitations 12/21/2011   • Sickle cell trait ([facility])      Past Surgical History:   Procedure Laterality Date   • OOPHORECTOMY  1984    RIGHT   • LAPAROTOMY     • VENTRAL HERNIA REPAIR       Family History   Problem Relation Age of Onset   • Clotting Disorder Mother    • Kidney Disease Mother    • Clotting Disorder Sister    • Cancer Father    • Heart Failure Father      Social History     Social History   • Marital status: Single     Spouse name: N/A   • Number of children: N/A   • Years of education: N/A     Occupational History   • Not on file.     Social History Main Topics   • Smoking status: Current  Every Day Smoker     Packs/day: 0.25     Years: 20.00     Types: Cigarettes   • Smokeless tobacco: Never Used      Comment: 5 Cigarettes a day   • Alcohol use No   • Drug use: No   • Sexual activity: Not on file     Other Topics Concern   • Not on file     Social History Narrative   • No narrative on file     Allergies   Allergen Reactions   • Erythromycin      Outpatient Encounter Prescriptions as of 5/21/2018   Medication Sig Dispense Refill   • warfarin (COUMADIN) 5 MG Tab      • omeprazole (PRILOSEC) 20 MG delayed-release capsule Take 20 mg by mouth 1 time daily as needed.     • polyethylene glycol 3350 (MIRALAX) Powder Take  by mouth 1 time daily as needed.     • metformin (GLUCOPHAGE) 1000 MG tablet Take 1,000 mg by mouth 2 times a day.     • hydrochlorothiazide (HYDRODIURIL) 25 MG Tab Take 25 mg by mouth every day.     • atorvastatin (LIPITOR) 10 MG Tab      • lisinopril (PRINIVIL, ZESTRIL) 40 MG tablet      • glipiZIDE (GLUCOTROL) 10 MG Tab Take 10 mg by mouth every day.     • Coenzyme Q10 (CO Q 10 PO) Take  by mouth.     • potassium chloride SA (K-DUR) 20 MEQ TBCR Take 20 mEq by mouth as needed.     • fluticasone (FLONASE) 50 MCG/ACT nasal spray Spray 1 Spray in nose every day. Each Nostril     • albuterol (PROAIR HFA) 108 (90 BASE) MCG/ACT AERS Inhale 2 Puffs by mouth every 6 hours as needed.     • warfarin (COUMADIN) 4 MG TABS Take 4 mg by mouth every day.     • hydrocodone-acetaminophen (NORCO) 5-325 MG TABS per tablet Take 1-2 Tabs by mouth every four hours as needed.     • furosemide (LASIX) 40 MG TABS Take 1 Tab by mouth as needed. 10 Tab 3   • sitagliptin (JANUVIA) 100 MG TABS Take 100 mg by mouth every day.     • magnesium oxide (MAG-OX) 400 MG TABS Take 400 mg by mouth 2 times a day.     • amoxicillin-clavulanate (AUGMENTIN) 875-125 MG Tab      • predniSONE (DELTASONE) 20 MG Tab      • CHERATUSSIN AC Syrup oral solution TAKE 10ML BY MOUTH EVERY 4 HOURS NOT COVERED  0   • carvedilol (COREG) 25 MG  "Tab Take 2 Tabs by mouth 2 times a day, with meals. 120 Tab 11   • atorvastatin (LIPITOR) 20 MG TABS Take 1 Tab by mouth every evening. 90 Tab 1   • hydrochlorothiazide (HYDRODIURIL) 50 MG TABS Take 50 mg by mouth every day.     • metformin SR (GLUCOPHAGE XR) 500 MG TB24 Take 500 mg by mouth 2 times a day.     • lisinopril (PRINIVIL) 20 MG TABS Take 40 mg by mouth every day.       No facility-administered encounter medications on file as of 5/21/2018.      Review of Systems   All other systems reviewed and are negative.       Objective:   /80   Pulse 72   Ht 1.702 m (5' 7\")   Wt (!) 129.3 kg (285 lb)   SpO2 98%   BMI 44.64 kg/m²     Physical Exam   Constitutional: She appears well-developed and well-nourished.   Neck: No JVD present.   Cardiovascular: Normal rate and regular rhythm.    No murmur heard.  Pulmonary/Chest: Effort normal and breath sounds normal. She has no rales.   Abdominal: Soft. There is no tenderness.   Musculoskeletal: She exhibits edema (Trace pretibial: However, the left lower extremity does appear to be more swollen overall than the right).     Lab Results   Component Value Date/Time    CHOLSTRLTOT 145 05/09/2018 10:24 AM    CHOLSTRLTOT 143 01/08/2013 12:00 AM    LDL 69 05/09/2018 10:24 AM    LDL 70 01/08/2013 12:00 AM    HDL 46 05/09/2018 10:24 AM    HDL 48 01/08/2013 12:00 AM    TRIGLYCERIDE 151 (H) 05/09/2018 10:24 AM    TRIGLYCERIDE 125 01/08/2013 12:00 AM       Lab Results   Component Value Date/Time    SODIUM 139 05/09/2018 10:24 AM    SODIUM 138 04/20/2014 09:02 PM    POTASSIUM 3.8 05/09/2018 10:24 AM    POTASSIUM 3.4 (L) 04/20/2014 09:02 PM    CHLORIDE 95 (L) 05/09/2018 10:24 AM    CHLORIDE 101 04/20/2014 09:02 PM    CO2 28 05/09/2018 10:24 AM    CO2 29 04/20/2014 09:02 PM    GLUCOSE 110 (H) 05/09/2018 10:24 AM    GLUCOSE 143 (H) 04/20/2014 09:02 PM    BUN 10 05/09/2018 10:24 AM    BUN 12 04/20/2014 09:02 PM    CREATININE 0.93 05/09/2018 10:24 AM    CREATININE 1.24 04/20/2014 " 09:02 PM    CREATININE 1.05 (H) 01/08/2013 12:00 AM    BUNCREATRAT 11 05/09/2018 10:24 AM    BUNCREATRAT 12 01/08/2013 12:00 AM     Lab Results   Component Value Date/Time    ALKPHOSPHAT 71 05/09/2018 10:24 AM    ALKPHOSPHAT 85 01/08/2013 12:00 AM    ASTSGOT 14 05/09/2018 10:24 AM    ASTSGOT 19 01/08/2013 12:00 AM    ALTSGPT 20 05/09/2018 10:24 AM    ALTSGPT 26 01/08/2013 12:00 AM    TBILIRUBIN 0.3 05/09/2018 10:24 AM    TBILIRUBIN 0.5 01/08/2013 12:00 AM      No results found for: BNPBTYPENAT       Assessment:     1. Essential hypertension     2. Hypercoagulable state (HCC)     3. Palpitations     4. Dyslipidemia         Medical Decision Making:  Today's Assessment / Status / Plan:     Ms. Younger has had difficulty with left lower extremity swelling over the last week.  She states that her INR was down to 1.7.  At this time, she will be evaluated with a left lower extremity ultrasound.  She will follow-up in a couple of days.  Her palpitations have resolved.  We will also increase atorvastatin to 20 mg a day.  She will have lab work in 6 weeks and follow-up again in about 2 months.    If she does have DVT in the left lower extremity, we will need to try to obtain approval for a direct oral anticoagulant.

## 2018-05-21 NOTE — LETTER
[facility]   [address] [place] [address]  [place] [address]  Phone: [phone]  Fax: [phone]              Asha Younger  1960    Encounter Date: 5/21/2018    Ramsey Gill M.D.          PROGRESS NOTE:  Chief Complaint   Patient presents with   • HTN (Uncontrolled)     F/V: 6 MO       Subjective:   Asha Younger is a 57 y.o. female who presents today for followup for hypertension and edema. She also has hyperlipidemia and a history of a hypercoagulable state. She is on chronic anticoagulation therapy.     After last visit, she did have an overnight pulse oximetry which showed nocturnal hypoxemia.  She was then evaluated by the sleep center and is now on CPAP therapy.    Her blood pressures at home have been running about 130/80.    Her palpitations have resolved.  She has had some left lower extremity edema over the last week.  She is noted no chest discomfort, dyspnea on exertion, PND or orthopnea.  She has had no severe lightheadedness.  She occasionally notes some lightheadedness which she states might be related to a low blood sugar.    Past Medical History:   Diagnosis Date   • Bronchitis    • Diabetes mellitus type 2 in obese ([facility]) 10/11/2011   • DVT (deep vein thrombosis) in pregnancy ([facility]) 10/11/2011   • DVT (deep venous thrombosis) ([facility]) 10/11/2011   • Edema 10/11/2011   • GERD (gastroesophageal reflux disease) 10/11/2011   • [place] measles    • HTN (hypertension) 10/11/2011   • Hypercoagulable state ([facility]) 10/11/2011   • Hyperlipemia 1/22/2013   • Hypertension    • Nasal drainage    • Obesity    • Palpitations 12/21/2011   • Sickle cell trait ([facility])      Past Surgical History:   Procedure Laterality Date   • OOPHORECTOMY  1984    RIGHT   • LAPAROTOMY     • VENTRAL HERNIA REPAIR       Family History   Problem Relation Age of Onset   • Clotting Disorder Mother    • Kidney Disease Mother    • Clotting Disorder Sister    • Cancer Father    •  Heart Failure Father      Social History     Social History   • Marital status: Single     Spouse name: N/A   • Number of children: N/A   • Years of education: N/A     Occupational History   • Not on file.     Social History Main Topics   • Smoking status: Current Every Day Smoker     Packs/day: 0.25     Years: 20.00     Types: Cigarettes   • Smokeless tobacco: Never Used      Comment: 5 Cigarettes a day   • Alcohol use No   • Drug use: No   • Sexual activity: Not on file     Other Topics Concern   • Not on file     Social History Narrative   • No narrative on file     Allergies   Allergen Reactions   • Erythromycin      Outpatient Encounter Prescriptions as of 5/21/2018   Medication Sig Dispense Refill   • warfarin (COUMADIN) 5 MG Tab      • omeprazole (PRILOSEC) 20 MG delayed-release capsule Take 20 mg by mouth 1 time daily as needed.     • polyethylene glycol 3350 (MIRALAX) Powder Take  by mouth 1 time daily as needed.     • metformin (GLUCOPHAGE) 1000 MG tablet Take 1,000 mg by mouth 2 times a day.     • hydrochlorothiazide (HYDRODIURIL) 25 MG Tab Take 25 mg by mouth every day.     • atorvastatin (LIPITOR) 10 MG Tab      • lisinopril (PRINIVIL, ZESTRIL) 40 MG tablet      • glipiZIDE (GLUCOTROL) 10 MG Tab Take 10 mg by mouth every day.     • Coenzyme Q10 (CO Q 10 PO) Take  by mouth.     • potassium chloride SA (K-DUR) 20 MEQ TBCR Take 20 mEq by mouth as needed.     • fluticasone (FLONASE) 50 MCG/ACT nasal spray Spray 1 Spray in nose every day. Each Nostril     • albuterol (PROAIR HFA) 108 (90 BASE) MCG/ACT AERS Inhale 2 Puffs by mouth every 6 hours as needed.     • warfarin (COUMADIN) 4 MG TABS Take 4 mg by mouth every day.     • hydrocodone-acetaminophen (NORCO) 5-325 MG TABS per tablet Take 1-2 Tabs by mouth every four hours as needed.     • furosemide (LASIX) 40 MG TABS Take 1 Tab by mouth as needed. 10 Tab 3   • sitagliptin (JANUVIA) 100 MG TABS Take 100 mg by mouth every day.     • magnesium oxide (MAG-OX)  "400 MG TABS Take 400 mg by mouth 2 times a day.     • amoxicillin-clavulanate (AUGMENTIN) 875-125 MG Tab      • predniSONE (DELTASONE) 20 MG Tab      • CHERATUSSIN AC Syrup oral solution TAKE 10ML BY MOUTH EVERY 4 HOURS NOT COVERED  0   • carvedilol (COREG) 25 MG Tab Take 2 Tabs by mouth 2 times a day, with meals. 120 Tab 11   • atorvastatin (LIPITOR) 20 MG TABS Take 1 Tab by mouth every evening. 90 Tab 1   • hydrochlorothiazide (HYDRODIURIL) 50 MG TABS Take 50 mg by mouth every day.     • metformin SR (GLUCOPHAGE XR) 500 MG TB24 Take 500 mg by mouth 2 times a day.     • lisinopril (PRINIVIL) 20 MG TABS Take 40 mg by mouth every day.       No facility-administered encounter medications on file as of 5/21/2018.      Review of Systems   All other systems reviewed and are negative.       Objective:   /80   Pulse 72   Ht 1.702 m (5' 7\")   Wt (!) 129.3 kg (285 lb)   SpO2 98%   BMI 44.64 kg/m²      Physical Exam   Constitutional: She appears well-developed and well-nourished.   Neck: No JVD present.   Cardiovascular: Normal rate and regular rhythm.    No murmur heard.  Pulmonary/Chest: Effort normal and breath sounds normal. She has no rales.   Abdominal: Soft. There is no tenderness.   Musculoskeletal: She exhibits edema (Trace pretibial: However, the left lower extremity does appear to be more swollen overall than the right).     Lab Results   Component Value Date/Time    CHOLSTRLTOT 145 05/09/2018 10:24 AM    CHOLSTRLTOT 143 01/08/2013 12:00 AM    LDL 69 05/09/2018 10:24 AM    LDL 70 01/08/2013 12:00 AM    HDL 46 05/09/2018 10:24 AM    HDL 48 01/08/2013 12:00 AM    TRIGLYCERIDE 151 (H) 05/09/2018 10:24 AM    TRIGLYCERIDE 125 01/08/2013 12:00 AM       Lab Results   Component Value Date/Time    SODIUM 139 05/09/2018 10:24 AM    SODIUM 138 04/20/2014 09:02 PM    POTASSIUM 3.8 05/09/2018 10:24 AM    POTASSIUM 3.4 (L) 04/20/2014 09:02 PM    CHLORIDE 95 (L) 05/09/2018 10:24 AM    CHLORIDE 101 04/20/2014 09:02 PM   "    CO2 28 05/09/2018 10:24 AM    CO2 29 04/20/2014 09:02 PM    GLUCOSE 110 (H) 05/09/2018 10:24 AM    GLUCOSE 143 (H) 04/20/2014 09:02 PM    BUN 10 05/09/2018 10:24 AM    BUN 12 04/20/2014 09:02 PM    CREATININE 0.93 05/09/2018 10:24 AM    CREATININE 1.24 04/20/2014 09:02 PM    CREATININE 1.05 (H) 01/08/2013 12:00 AM    BUNCREATRAT 11 05/09/2018 10:24 AM    BUNCREATRAT 12 01/08/2013 12:00 AM     Lab Results   Component Value Date/Time    ALKPHOSPHAT 71 05/09/2018 10:24 AM    ALKPHOSPHAT 85 01/08/2013 12:00 AM    ASTSGOT 14 05/09/2018 10:24 AM    ASTSGOT 19 01/08/2013 12:00 AM    ALTSGPT 20 05/09/2018 10:24 AM    ALTSGPT 26 01/08/2013 12:00 AM    TBILIRUBIN 0.3 05/09/2018 10:24 AM    TBILIRUBIN 0.5 01/08/2013 12:00 AM      No results found for: BNPBTYPENAT       Assessment:     1. Essential hypertension     2. Hypercoagulable state (HCC)     3. Palpitations     4. Dyslipidemia         Medical Decision Making:  Today's Assessment / Status / Plan:     Ms. Younger has had difficulty with left lower extremity swelling over the last week.  She states that her INR was down to 1.7.  At this time, she will be evaluated with a left lower extremity ultrasound.  She will follow-up in a couple of days.  Her palpitations have resolved.  We will also increase atorvastatin to 20 mg a day.  She will have lab work in 6 weeks and follow-up again in about 2 months.    She does have DVT in the left lower extremity, we will need to try to obtain approval for 1 of the direct oral anticoagulants.      Dorota Ball, SHANTELL.P.N.  [address] [place] [address]  VIA Facsimile: [phone]

## 2018-05-22 ENCOUNTER — OFFICE VISIT (OUTPATIENT)
Dept: CARDIOLOGY | Facility: MEDICAL CENTER | Age: 58
End: 2018-05-22
Payer: MEDICARE

## 2018-05-22 VITALS
WEIGHT: 285 LBS | OXYGEN SATURATION: 98 % | SYSTOLIC BLOOD PRESSURE: 146 MMHG | HEIGHT: 67 IN | HEART RATE: 72 BPM | DIASTOLIC BLOOD PRESSURE: 92 MMHG | BODY MASS INDEX: 44.73 KG/M2

## 2018-05-22 DIAGNOSIS — D68.59 HYPERCOAGULABLE STATE (HCC): ICD-10-CM

## 2018-05-22 DIAGNOSIS — I10 ESSENTIAL HYPERTENSION: ICD-10-CM

## 2018-05-22 DIAGNOSIS — Z86.718 HISTORY OF RECURRENT DEEP VEIN THROMBOSIS (DVT): ICD-10-CM

## 2018-05-22 DIAGNOSIS — E78.5 DYSLIPIDEMIA: ICD-10-CM

## 2018-05-22 DIAGNOSIS — M79.89 SWELLING OF LOWER EXTREMITY: ICD-10-CM

## 2018-05-22 PROCEDURE — 99214 OFFICE O/P EST MOD 30 MIN: CPT | Performed by: NURSE PRACTITIONER

## 2018-05-22 ASSESSMENT — ENCOUNTER SYMPTOMS
CLAUDICATION: 0
ORTHOPNEA: 0
ABDOMINAL PAIN: 0
PALPITATIONS: 0
PND: 0
DIZZINESS: 0
FEVER: 0
MYALGIAS: 0
COUGH: 0
SHORTNESS OF BREATH: 0

## 2018-05-22 NOTE — LETTER
[facility]   [address] [place] [address]  [place] [address]  Phone: [phone]  Fax: [phone]              Asha Younger  1960    Encounter Date: 5/22/2018    MOIZ Cotton          PROGRESS NOTE:  Chief Complaint   Patient presents with   • HTN (Controlled)       Subjective:   Asha Younger is a 57 y.o. female who presents today for follow-up on her left lower extremity edema.    Patient of Dr. Gill, patient was seen yesterday.  Patient was sent for a left lower extremity ultrasound study. Patient is here for results.    Pt reports that she pulled her back this morning and is having some back pain. Patient denies chest pain, shortness of breath, palpitations, dizziness/lightheadedness, orthopnea, PND.     Additonally, patient has the following medical problems:    -Clotting disorder: Taking warfarin, history of DVT     -Hypertension: Taking carvedilol, lisinopril, hydrochlorothiazide, furosemide    -Hyperlipidemia: Taking atorvastatin 20 mg    -Diabetes: Taking metformin and Januvia    -Sleep apnea: Using CPAP    -Current smoker: Trying to quit smoking    Past Medical History:   Diagnosis Date   • Bronchitis    • Diabetes mellitus type 2 in obese ([facility]) 10/11/2011   • DVT (deep vein thrombosis) in pregnancy ([facility]) 10/11/2011   • DVT (deep venous thrombosis) ([facility]) 10/11/2011   • Edema 10/11/2011   • GERD (gastroesophageal reflux disease) 10/11/2011   • [place] measles    • HTN (hypertension) 10/11/2011   • Hypercoagulable state ([facility]) 10/11/2011   • Hyperlipemia 1/22/2013   • Hypertension    • Nasal drainage    • Obesity    • Palpitations 12/21/2011   • Sickle cell trait ([facility])      Past Surgical History:   Procedure Laterality Date   • OOPHORECTOMY  1984    RIGHT   • LAPAROTOMY     • VENTRAL HERNIA REPAIR       Family History   Problem Relation Age of Onset   • Clotting Disorder Mother    • Kidney Disease Mother    • Cancer Mother      lung  cancer   • Clotting Disorder Sister      blood clots, protein deficiency   • Cancer Father      Pancreatic  cancer   • Heart Failure Father    • Hypertension Sister      Social History     Social History   • Marital status: Single     Spouse name: N/A   • Number of children: N/A   • Years of education: N/A     Occupational History   • Not on file.     Social History Main Topics   • Smoking status: Current Every Day Smoker     Packs/day: 0.25     Years: 20.00     Types: Cigarettes   • Smokeless tobacco: Never Used      Comment: 5 Cigarettes a day   • Alcohol use No   • Drug use: No   • Sexual activity: Not on file     Other Topics Concern   • Not on file     Social History Narrative   • No narrative on file     Allergies   Allergen Reactions   • Erythromycin      Outpatient Encounter Prescriptions as of 5/22/2018   Medication Sig Dispense Refill   • vitamin D 80727 units Tab Take 50,000 Units by mouth every 7 days.     • atorvastatin (LIPITOR) 20 MG Tab Take 1 Tab by mouth every day. 30 Tab 11   • warfarin (COUMADIN) 5 MG Tab      • omeprazole (PRILOSEC) 20 MG delayed-release capsule Take 20 mg by mouth 1 time daily as needed.     • polyethylene glycol 3350 (MIRALAX) Powder Take  by mouth 1 time daily as needed.     • metformin (GLUCOPHAGE) 1000 MG tablet Take 1,000 mg by mouth 2 times a day.     • hydrochlorothiazide (HYDRODIURIL) 25 MG Tab Take 25 mg by mouth 2 Times a Day.     • lisinopril (PRINIVIL, ZESTRIL) 40 MG tablet      • glipiZIDE (GLUCOTROL) 10 MG Tab Take 10 mg by mouth every day.     • Coenzyme Q10 (CO Q 10 PO) Take  by mouth.     • carvedilol (COREG) 25 MG Tab Take 2 Tabs by mouth 2 times a day, with meals. 120 Tab 11   • potassium chloride SA (K-DUR) 20 MEQ TBCR Take 20 mEq by mouth as needed.     • fluticasone (FLONASE) 50 MCG/ACT nasal spray Spray 1 Spray in nose every day. Each Nostril     • albuterol (PROAIR HFA) 108 (90 BASE) MCG/ACT AERS Inhale 2 Puffs by mouth every 6 hours as needed.     •  "warfarin (COUMADIN) 4 MG TABS Take 4 mg by mouth every day.     • hydrocodone-acetaminophen (NORCO) 5-325 MG TABS per tablet Take 1-2 Tabs by mouth every four hours as needed.     • furosemide (LASIX) 40 MG TABS Take 1 Tab by mouth as needed. 10 Tab 3   • sitagliptin (JANUVIA) 100 MG TABS Take 100 mg by mouth every day.     • magnesium oxide (MAG-OX) 400 MG TABS Take 400 mg by mouth 2 times a day.     • [DISCONTINUED] vitamin D (CHOLECALCIFEROL) 1000 UNIT Tab Take 5,000 Units by mouth every 7 days.     • [DISCONTINUED] amoxicillin-clavulanate (AUGMENTIN) 875-125 MG Tab      • [DISCONTINUED] predniSONE (DELTASONE) 20 MG Tab      • [DISCONTINUED] CHERATUSSIN AC Syrup oral solution TAKE 10ML BY MOUTH EVERY 4 HOURS NOT COVERED  0   • [DISCONTINUED] hydrochlorothiazide (HYDRODIURIL) 50 MG TABS Take 50 mg by mouth every day.     • [DISCONTINUED] metformin SR (GLUCOPHAGE XR) 500 MG TB24 Take 500 mg by mouth 2 times a day.     • [DISCONTINUED] lisinopril (PRINIVIL) 20 MG TABS Take 40 mg by mouth every day.       No facility-administered encounter medications on file as of 5/22/2018.      Review of Systems   Constitutional: Negative for fever and malaise/fatigue.   Respiratory: Negative for cough and shortness of breath.    Cardiovascular: Positive for leg swelling. Negative for chest pain, palpitations, orthopnea, claudication and PND.   Gastrointestinal: Negative for abdominal pain.   Musculoskeletal: Negative for myalgias.   Neurological: Negative for dizziness.   All other systems reviewed and are negative.       Objective:   /92   Pulse 72   Ht 1.702 m (5' 7\")   Wt (!) 129.3 kg (285 lb)   SpO2 98%   BMI 44.64 kg/m²      Physical Exam   Constitutional: She is oriented to person, place, and time. She appears well-developed and well-nourished.   Obese, BMI 44.64   HENT:   Head: Normocephalic and atraumatic.   Eyes: EOM are normal. Pupils are equal, round, and reactive to light.   Neck: Normal range of motion. " Neck supple. No JVD present.   Cardiovascular: Normal rate, regular rhythm and normal heart sounds.    Pulmonary/Chest: Effort normal and breath sounds normal. No respiratory distress. She has no wheezes. She has no rales.   Musculoskeletal: She exhibits edema (Bilateral left > right 1-2+ LE edema).   Neurological: She is alert and oriented to person, place, and time.   Skin: Skin is warm and dry.   Psychiatric: She has a normal mood and affect. Her behavior is normal.     Lab Results   Component Value Date/Time    CHOLSTRLTOT 145 05/09/2018 10:24 AM    CHOLSTRLTOT 143 01/08/2013 12:00 AM    LDL 69 05/09/2018 10:24 AM    LDL 70 01/08/2013 12:00 AM    HDL 46 05/09/2018 10:24 AM    HDL 48 01/08/2013 12:00 AM    TRIGLYCERIDE 151 (H) 05/09/2018 10:24 AM    TRIGLYCERIDE 125 01/08/2013 12:00 AM       Lab Results   Component Value Date/Time    SODIUM 139 05/09/2018 10:24 AM    SODIUM 138 04/20/2014 09:02 PM    POTASSIUM 3.8 05/09/2018 10:24 AM    POTASSIUM 3.4 (L) 04/20/2014 09:02 PM    CHLORIDE 95 (L) 05/09/2018 10:24 AM    CHLORIDE 101 04/20/2014 09:02 PM    CO2 28 05/09/2018 10:24 AM    CO2 29 04/20/2014 09:02 PM    GLUCOSE 110 (H) 05/09/2018 10:24 AM    GLUCOSE 143 (H) 04/20/2014 09:02 PM    BUN 10 05/09/2018 10:24 AM    BUN 12 04/20/2014 09:02 PM    CREATININE 0.93 05/09/2018 10:24 AM    CREATININE 1.24 04/20/2014 09:02 PM    CREATININE 1.05 (H) 01/08/2013 12:00 AM    BUNCREATRAT 11 05/09/2018 10:24 AM    BUNCREATRAT 12 01/08/2013 12:00 AM     Lab Results   Component Value Date/Time    ALKPHOSPHAT 71 05/09/2018 10:24 AM    ALKPHOSPHAT 85 01/08/2013 12:00 AM    ASTSGOT 14 05/09/2018 10:24 AM    ASTSGOT 19 01/08/2013 12:00 AM    ALTSGPT 20 05/09/2018 10:24 AM    ALTSGPT 26 01/08/2013 12:00 AM    TBILIRUBIN 0.3 05/09/2018 10:24 AM    TBILIRUBIN 0.5 01/08/2013 12:00 AM     US of left LE 5/21/18 -results reviewed with patient  No left lower extremity deep venous thrombosis    Assessment:     1. Hypercoagulable state (HCC)      2. Swelling of lower extremity     3. History of recurrent deep vein thrombosis (DVT)     4. Essential hypertension     5. Dyslipidemia         Medical Decision Making:  Today's Assessment / Status / Plan:   1. Left LE edema: Hx DVT, no evidence of DVT seen on ultrasound  -Continue warfarin, followed by the anticoagulation clinic  -Continue furosemide 40 mg daily as needed  -elevate LE in the evening and consider Compression stockings    2.  Hypertension: Stable  -Continue carvedilol 50 mg twice daily  -Continue lisinopril 40 mg daily  -Continue hydrochlorothiazide 25 mg twice a day    3.  Dyslipidemia: Last LDL was 69 on 5/9/2018  -Continue atorvastatin 20 mg daily  -Encouraged patient on complete smoking cessation    FU in clinic in 2 months With Dr. Gill with las in 6 weeks. Sooner if needed.    Patient verbalizes understanding and agrees with the plan of care.     Collaborating MD: MD Dorota Velasquez, A.P.N.  [address] [place] [address]  VIA Facsimile: [phone]

## 2018-05-23 NOTE — PROGRESS NOTES
Chief Complaint   Patient presents with   • HTN (Controlled)       Subjective:   Asha Younger is a 57 y.o. female who presents today for follow-up on her left lower extremity edema.    Patient of Dr. Gill, patient was seen yesterday.  Patient was sent for a left lower extremity ultrasound study. Patient is here for results.    Pt reports that she pulled her back this morning and is having some back pain. Patient denies chest pain, shortness of breath, palpitations, dizziness/lightheadedness, orthopnea, PND.     Additonally, patient has the following medical problems:    -Clotting disorder: Taking warfarin, history of DVT     -Hypertension: Taking carvedilol, lisinopril, hydrochlorothiazide, furosemide    -Hyperlipidemia: Taking atorvastatin 20 mg    -Diabetes: Taking metformin and Januvia    -Sleep apnea: Using CPAP    -Current smoker: Trying to quit smoking    Past Medical History:   Diagnosis Date   • Bronchitis    • Diabetes mellitus type 2 in obese ([facility]) 10/11/2011   • DVT (deep vein thrombosis) in pregnancy ([facility]) 10/11/2011   • DVT (deep venous thrombosis) ([facility]) 10/11/2011   • Edema 10/11/2011   • GERD (gastroesophageal reflux disease) 10/11/2011   • Montserratian measles    • HTN (hypertension) 10/11/2011   • Hypercoagulable state ([facility]) 10/11/2011   • Hyperlipemia 1/22/2013   • Hypertension    • Nasal drainage    • Obesity    • Palpitations 12/21/2011   • Sickle cell trait ([facility])      Past Surgical History:   Procedure Laterality Date   • OOPHORECTOMY  1984    RIGHT   • LAPAROTOMY     • VENTRAL HERNIA REPAIR       Family History   Problem Relation Age of Onset   • Clotting Disorder Mother    • Kidney Disease Mother    • Cancer Mother      lung cancer   • Clotting Disorder Sister      blood clots, protein deficiency   • Cancer Father      Pancreatic  cancer   • Heart Failure Father    • Hypertension Sister      Social History     Social History   • Marital status: Single     Spouse name: N/A   • Number of  children: N/A   • Years of education: N/A     Occupational History   • Not on file.     Social History Main Topics   • Smoking status: Current Every Day Smoker     Packs/day: 0.25     Years: 20.00     Types: Cigarettes   • Smokeless tobacco: Never Used      Comment: 5 Cigarettes a day   • Alcohol use No   • Drug use: No   • Sexual activity: Not on file     Other Topics Concern   • Not on file     Social History Narrative   • No narrative on file     Allergies   Allergen Reactions   • Erythromycin      Outpatient Encounter Prescriptions as of 5/22/2018   Medication Sig Dispense Refill   • vitamin D 73670 units Tab Take 50,000 Units by mouth every 7 days.     • atorvastatin (LIPITOR) 20 MG Tab Take 1 Tab by mouth every day. 30 Tab 11   • warfarin (COUMADIN) 5 MG Tab      • omeprazole (PRILOSEC) 20 MG delayed-release capsule Take 20 mg by mouth 1 time daily as needed.     • polyethylene glycol 3350 (MIRALAX) Powder Take  by mouth 1 time daily as needed.     • metformin (GLUCOPHAGE) 1000 MG tablet Take 1,000 mg by mouth 2 times a day.     • hydrochlorothiazide (HYDRODIURIL) 25 MG Tab Take 25 mg by mouth 2 Times a Day.     • lisinopril (PRINIVIL, ZESTRIL) 40 MG tablet      • glipiZIDE (GLUCOTROL) 10 MG Tab Take 10 mg by mouth every day.     • Coenzyme Q10 (CO Q 10 PO) Take  by mouth.     • carvedilol (COREG) 25 MG Tab Take 2 Tabs by mouth 2 times a day, with meals. 120 Tab 11   • potassium chloride SA (K-DUR) 20 MEQ TBCR Take 20 mEq by mouth as needed.     • fluticasone (FLONASE) 50 MCG/ACT nasal spray Spray 1 Spray in nose every day. Each Nostril     • albuterol (PROAIR HFA) 108 (90 BASE) MCG/ACT AERS Inhale 2 Puffs by mouth every 6 hours as needed.     • warfarin (COUMADIN) 4 MG TABS Take 4 mg by mouth every day.     • hydrocodone-acetaminophen (NORCO) 5-325 MG TABS per tablet Take 1-2 Tabs by mouth every four hours as needed.     • furosemide (LASIX) 40 MG TABS Take 1 Tab by mouth as needed. 10 Tab 3   • sitagliptin  "(JANUVIA) 100 MG TABS Take 100 mg by mouth every day.     • magnesium oxide (MAG-OX) 400 MG TABS Take 400 mg by mouth 2 times a day.     • [DISCONTINUED] vitamin D (CHOLECALCIFEROL) 1000 UNIT Tab Take 5,000 Units by mouth every 7 days.     • [DISCONTINUED] amoxicillin-clavulanate (AUGMENTIN) 875-125 MG Tab      • [DISCONTINUED] predniSONE (DELTASONE) 20 MG Tab      • [DISCONTINUED] CHERATUSSIN AC Syrup oral solution TAKE 10ML BY MOUTH EVERY 4 HOURS NOT COVERED  0   • [DISCONTINUED] hydrochlorothiazide (HYDRODIURIL) 50 MG TABS Take 50 mg by mouth every day.     • [DISCONTINUED] metformin SR (GLUCOPHAGE XR) 500 MG TB24 Take 500 mg by mouth 2 times a day.     • [DISCONTINUED] lisinopril (PRINIVIL) 20 MG TABS Take 40 mg by mouth every day.       No facility-administered encounter medications on file as of 5/22/2018.      Review of Systems   Constitutional: Negative for fever and malaise/fatigue.   Respiratory: Negative for cough and shortness of breath.    Cardiovascular: Positive for leg swelling. Negative for chest pain, palpitations, orthopnea, claudication and PND.   Gastrointestinal: Negative for abdominal pain.   Musculoskeletal: Negative for myalgias.   Neurological: Negative for dizziness.   All other systems reviewed and are negative.       Objective:   /92   Pulse 72   Ht 1.702 m (5' 7\")   Wt (!) 129.3 kg (285 lb)   SpO2 98%   BMI 44.64 kg/m²     Physical Exam   Constitutional: She is oriented to person, place, and time. She appears well-developed and well-nourished.   Obese, BMI 44.64   HENT:   Head: Normocephalic and atraumatic.   Eyes: EOM are normal. Pupils are equal, round, and reactive to light.   Neck: Normal range of motion. Neck supple. No JVD present.   Cardiovascular: Normal rate, regular rhythm and normal heart sounds.    Pulmonary/Chest: Effort normal and breath sounds normal. No respiratory distress. She has no wheezes. She has no rales.   Musculoskeletal: She exhibits edema (Bilateral " left > right 1-2+ LE edema).   Neurological: She is alert and oriented to person, place, and time.   Skin: Skin is warm and dry.   Psychiatric: She has a normal mood and affect. Her behavior is normal.     Lab Results   Component Value Date/Time    CHOLSTRLTOT 145 05/09/2018 10:24 AM    CHOLSTRLTOT 143 01/08/2013 12:00 AM    LDL 69 05/09/2018 10:24 AM    LDL 70 01/08/2013 12:00 AM    HDL 46 05/09/2018 10:24 AM    HDL 48 01/08/2013 12:00 AM    TRIGLYCERIDE 151 (H) 05/09/2018 10:24 AM    TRIGLYCERIDE 125 01/08/2013 12:00 AM       Lab Results   Component Value Date/Time    SODIUM 139 05/09/2018 10:24 AM    SODIUM 138 04/20/2014 09:02 PM    POTASSIUM 3.8 05/09/2018 10:24 AM    POTASSIUM 3.4 (L) 04/20/2014 09:02 PM    CHLORIDE 95 (L) 05/09/2018 10:24 AM    CHLORIDE 101 04/20/2014 09:02 PM    CO2 28 05/09/2018 10:24 AM    CO2 29 04/20/2014 09:02 PM    GLUCOSE 110 (H) 05/09/2018 10:24 AM    GLUCOSE 143 (H) 04/20/2014 09:02 PM    BUN 10 05/09/2018 10:24 AM    BUN 12 04/20/2014 09:02 PM    CREATININE 0.93 05/09/2018 10:24 AM    CREATININE 1.24 04/20/2014 09:02 PM    CREATININE 1.05 (H) 01/08/2013 12:00 AM    BUNCREATRAT 11 05/09/2018 10:24 AM    BUNCREATRAT 12 01/08/2013 12:00 AM     Lab Results   Component Value Date/Time    ALKPHOSPHAT 71 05/09/2018 10:24 AM    ALKPHOSPHAT 85 01/08/2013 12:00 AM    ASTSGOT 14 05/09/2018 10:24 AM    ASTSGOT 19 01/08/2013 12:00 AM    ALTSGPT 20 05/09/2018 10:24 AM    ALTSGPT 26 01/08/2013 12:00 AM    TBILIRUBIN 0.3 05/09/2018 10:24 AM    TBILIRUBIN 0.5 01/08/2013 12:00 AM     US of left LE 5/21/18 -results reviewed with patient  No left lower extremity deep venous thrombosis    Assessment:     1. Hypercoagulable state (HCC)     2. Swelling of lower extremity     3. History of recurrent deep vein thrombosis (DVT)     4. Essential hypertension     5. Dyslipidemia         Medical Decision Making:  Today's Assessment / Status / Plan:   1. Left LE edema: Hx DVT, no evidence of DVT seen on  ultrasound  -Continue warfarin, followed by the anticoagulation clinic  -Continue furosemide 40 mg daily as needed  -elevate LE in the evening and consider Compression stockings    2.  Hypertension: Stable  -Continue carvedilol 50 mg twice daily  -Continue lisinopril 40 mg daily  -Continue hydrochlorothiazide 25 mg twice a day    3.  Dyslipidemia: Last LDL was 69 on 5/9/2018  -Continue atorvastatin 20 mg daily  -Encouraged patient on complete smoking cessation    FU in clinic in 2 months With Dr. Gill with las in 6 weeks. Sooner if needed.    Patient verbalizes understanding and agrees with the plan of care.     Collaborating MD: Jon Gill MD

## 2018-08-14 ENCOUNTER — SLEEP CENTER VISIT (OUTPATIENT)
Dept: SLEEP MEDICINE | Facility: MEDICAL CENTER | Age: 58
End: 2018-08-14
Payer: MEDICARE

## 2018-08-14 VITALS
BODY MASS INDEX: 44.1 KG/M2 | RESPIRATION RATE: 15 BRPM | HEIGHT: 67 IN | WEIGHT: 281 LBS | SYSTOLIC BLOOD PRESSURE: 142 MMHG | OXYGEN SATURATION: 97 % | DIASTOLIC BLOOD PRESSURE: 88 MMHG | HEART RATE: 68 BPM

## 2018-08-14 DIAGNOSIS — E66.01 MORBID OBESITY WITH BMI OF 40.0-44.9, ADULT (HCC): ICD-10-CM

## 2018-08-14 DIAGNOSIS — G47.33 OSA (OBSTRUCTIVE SLEEP APNEA): ICD-10-CM

## 2018-08-14 PROCEDURE — 99213 OFFICE O/P EST LOW 20 MIN: CPT | Performed by: FAMILY MEDICINE

## 2018-08-14 NOTE — PROGRESS NOTES
Subjective:      Asha Younger is a 57 y.o. female who presents with Follow-Up (1st Compliance Check )               [facility] Follow Up Note     Date: 8/14/2018 / Time: 1:11 PM    Patient ID:   Name:             Asha Younger   YOB: 1960  Age:                 57 y.o.  female   MRN:               [id number]      Thank you for requesting a sleep medicine consultation on Asha Younger at the sleep center. She presents today with the chief complaints of MISAEL follow up.     HISTORY OF PRESENT ILLNESS:       Pt is currently on Auto BIPAPEPAP min 12 cm EPAP max 20 cm PS 4 cm . She goes to sleep around 9 pm and wakes up around 5 am. She is getting about 7 hrs of sleep on a good night and about 5 hr of sleep on a bad night. The bad nights are about couple per week. She is using CPAP most days of the week. Pt reports 3-4 hrs of average nightly use of CPAP. Pt denies snoring, gasping,choking.Pt has significant mask leak that is interfering with sleep.      The 30 day compliance was downloaded which shows inadequate compliance with more that 4 hr usage about 43%. The AHI is has improved to 0.7/hr. The mask leak is normal. The symptoms of excessive daytime, snoring and gasping has improved. Inadequate complaince due to mask leak and dry mouth.             PMH:  has a past medical history of Bronchitis; Diabetes mellitus type 2 in obese ([facility]) (10/11/2011); DVT (deep vein thrombosis) in pregnancy ([facility]) (10/11/2011); DVT (deep venous thrombosis) ([facility]) (10/11/2011); Edema (10/11/2011); GERD (gastroesophageal reflux disease) (10/11/2011); Sinhala measles; HTN (hypertension) (10/11/2011); Hypercoagulable state ([facility]) (10/11/2011); Hyperlipemia (1/22/2013); Hypertension; Nasal drainage; Obesity; Palpitations (12/21/2011); and Sickle cell trait ([facility]).  MEDS: Current Outpatient Prescriptions: •  vitamin D 19052 units Tab, Take 50,000 Units by mouth every 7 days., Disp: , Rfl: •  atorvastatin  (LIPITOR) 20 MG Tab, Take 1 Tab by mouth every day., Disp: 30 Tab, Rfl: 11•  warfarin (COUMADIN) 5 MG Tab, , Disp: , Rfl: •  omeprazole (PRILOSEC) 20 MG delayed-release capsule, Take 20 mg by mouth 1 time daily as needed., Disp: , Rfl: •  polyethylene glycol 3350 (MIRALAX) Powder, Take  by mouth 1 time daily as needed., Disp: , Rfl: •  metformin (GLUCOPHAGE) 1000 MG tablet, Take 1,000 mg by mouth 2 times a day., Disp: , Rfl: •  hydrochlorothiazide (HYDRODIURIL) 25 MG Tab, Take 25 mg by mouth 2 Times a Day., Disp: , Rfl: •  lisinopril (PRINIVIL, ZESTRIL) 40 MG tablet, , Disp: , Rfl: •  glipiZIDE (GLUCOTROL) 10 MG Tab, Take 10 mg by mouth every day., Disp: , Rfl:  •  Coenzyme Q10 (CO Q 10 PO), Take  by mouth., Disp: , Rfl: •  carvedilol (COREG) 25 MG Tab, Take 2 Tabs by mouth 2 times a day, with meals., Disp: 120 Tab, Rfl: 11•  potassium chloride SA (K-DUR) 20 MEQ TBCR, Take 20 mEq by mouth as needed., Disp: , Rfl: •  fluticasone (FLONASE) 50 MCG/ACT nasal spray, Spray 1 Spray in nose every day. Each Nostril, Disp: , Rfl: •  albuterol (PROAIR HFA) 108 (90 BASE) MCG/ACT AERS, Inhale 2 Puffs by mouth every 6 hours as needed., Disp: , Rfl: •  warfarin (COUMADIN) 4 MG TABS, Take 4 mg by mouth every day., Disp: , Rfl: •  hydrocodone-acetaminophen (NORCO) 5-325 MG TABS per tablet, Take 1-2 Tabs by mouth every four hours as needed., Disp: , Rfl: •  furosemide (LASIX) 40 MG TABS, Take 1 Tab by mouth as needed., Disp: 10 Tab, Rfl: 3•  sitagliptin (JANUVIA) 100 MG TABS, Take 100 mg by mouth every day., Disp: , Rfl: •  magnesium oxide (MAG-OX) 400 MG TABS, Take 400 mg by mouth 2 times a day., Disp: , Rfl:   ALLERGIES:  -- Erythromycin   SURGHX: Past Surgical History:  1984: OOPHORECTOMY      Comment:  RIGHT  No date: LAPAROTOMY  No date: VENTRAL HERNIA REPAIR  SOCHX:  reports that she has been smoking Cigarettes.  She has a 5.00 pack-year smoking history. She has never used smokeless tobacco. She reports that she does not drink  "alcohol or use drugs..  FH: Review of patient's family history indicates:  Problem: Clotting Disorder      Relation: Mother       Age of Onset: (Not Specified)   Problem: Kidney Disease      Relation: Mother       Age of Onset: (Not Specified)   Problem: Cancer      Relation: Mother       Age of Onset: (Not Specified)       Comment: lung cancer  Problem: Clotting Disorder      Relation: Sister       Age of Onset: (Not Specified)       Comment: blood clots, protein deficiency  Problem: Cancer      Relation: Father       Age of Onset: (Not Specified)       Comment: Pancreatic  cancer  Problem: Heart Failure      Relation: Father       Age of Onset: (Not Specified)   Problem: Hypertension      Relation: Sister       Age of Onset: (Not Specified)                 Review of Systems    Constitutional: Denies fevers, Denies weight changes  Eyes: Denies changes in vision, no eye pain  Ears/Nose/Throat/Mouth: Denies nasal congestion or sore throat   Cardiovascular: Denies chest pain or palpitations   Respiratory: Denies shortness of breath , Denies cough  Gastrointestinal/Hepatic: Denies abdominal pain, nausea, vomiting, diarrhea, constipation or GI bleeding   Genitourinary: Denies bladder dysfunction, dysuria or frequency  Musculoskeletal/Rheum: Denies  joint pain and swelling   Skin/Breast: Denies rash,    Objective:     Vitals:    08/14/18 1259   Height: 1.702 m (5' 7\")   Weight: (!) 127.5 kg (281 lb)   Weight % change since last entry.: 0 %   BP: 142/88   Pulse: 68   BMI (Calculated): 44.01   Resp: 15   O2 sat % room air: 97 %       Physical Exam   Constitutional: She is oriented to person, place, and time. She appears well-developed and well-nourished. No distress.   HENT:   Head: Normocephalic and atraumatic.   Mouth/Throat: Oropharynx is clear and moist.   Eyes: EOM are normal. Right eye exhibits no discharge. Left eye exhibits no discharge.   Cardiovascular: Normal rate and regular rhythm.    No murmur " heard.  Pulmonary/Chest: Effort normal and breath sounds normal. No respiratory distress. She has no wheezes.   Neurological: She is alert and oriented to person, place, and time.   Skin: Skin is warm and dry.   Psychiatric: She has a normal mood and affect.              Assessment/Plan:   ASSESSMENT AND PLAN     1. Sleep Apnea (MISAEL).The pathophysiology of sleep anea and the increased risk of cardiovascular morbidity from untreated sleep apnea is discussed in detail with the patient.    She is urged to avoid supine sleep, weight gain and alcoholic beverages since all of these can worsen sleep apnea. She is cautioned against drowsy driving. If She feels sleepy while driving, She must pull over for a break/nap, rather than persist on the road, in the interest of She own safety and that of others on the road.   Plan   - Changed pressure  Auto BiPAP IPAP max 17, EPAP min 12 PS 2/4 cm with FFM     - compliance download was reviewed and discussed with the pt   - compliance was reinforced     2.Patient's body mass index is 44.01 kg/m². Exercise and nutrition counseling were performed at this visit.    - recommended healthy diet with portion control , aerobic exercise 5x week  - obesity counseling done today: Drink more water. Reduce carb intake in drinks. Try to eat 3 meals a day with last meal 4-6 hours prior to bedtime. Limit caloric intake to 1600 - 1800 kcal per day.Restrict carbohydrate intake to 50 g per day to 100 g per day

## 2018-08-16 ENCOUNTER — TELEPHONE (OUTPATIENT)
Dept: PULMONOLOGY | Facility: HOSPICE | Age: 58
End: 2018-08-16

## 2018-08-16 NOTE — TELEPHONE ENCOUNTER
Faxed last chart note to [facility] in [facility] pharmacy [phone], [phone]

## 2018-08-17 ENCOUNTER — OFFICE VISIT (OUTPATIENT)
Dept: CARDIOLOGY | Facility: MEDICAL CENTER | Age: 58
End: 2018-08-17
Payer: MEDICARE

## 2018-08-17 VITALS
OXYGEN SATURATION: 94 % | HEIGHT: 67 IN | WEIGHT: 280 LBS | DIASTOLIC BLOOD PRESSURE: 82 MMHG | HEART RATE: 66 BPM | SYSTOLIC BLOOD PRESSURE: 140 MMHG | BODY MASS INDEX: 43.95 KG/M2

## 2018-08-17 DIAGNOSIS — R00.2 PALPITATIONS: ICD-10-CM

## 2018-08-17 DIAGNOSIS — I10 ESSENTIAL HYPERTENSION: ICD-10-CM

## 2018-08-17 DIAGNOSIS — D68.59 HYPERCOAGULABLE STATE (HCC): ICD-10-CM

## 2018-08-17 DIAGNOSIS — E78.5 DYSLIPIDEMIA: ICD-10-CM

## 2018-08-17 DIAGNOSIS — R60.0 LOCALIZED EDEMA: ICD-10-CM

## 2018-08-17 PROCEDURE — 99214 OFFICE O/P EST MOD 30 MIN: CPT | Performed by: INTERNAL MEDICINE

## 2018-08-17 RX ORDER — ROSUVASTATIN CALCIUM 20 MG/1
20 TABLET, COATED ORAL EVERY EVENING
Qty: 30 TAB | Refills: 11 | Status: SHIPPED | OUTPATIENT
Start: 2018-08-17 | End: 2019-07-10 | Stop reason: SDUPTHER

## 2018-08-17 NOTE — LETTER
[facility]   [address]   [address]   [place] [address]  Phone: [phone]  Fax: [phone]              Asha Younger  1960    Encounter Date: 8/17/2018    Ramsey Gill M.D.          PROGRESS NOTE:  Chief Complaint   Patient presents with   • HTN (Controlled)       Subjective:   Asha Younger is a 57 y.o. female who presents today for followup for hypertension and edema. She also has hyperlipidemia and a history of a hypercoagulable state. She is on chronic anticoagulation therapy.     After last visit, she did have an overnight pulse oximetry which showed nocturnal hypoxemia.  She was then evaluated by the sleep center and is now on CPAP therapy.    Her blood pressures at home have been running about 130/80.    She continues to have difficulty with edema.  At the time of her last visit we did reevaluate her with an ultrasound which was negative for DVT.  She has bilateral lower extremity edema though the left is greater than the right.  It is somewhat dependent in nature.  She has not been wearing compression stockings or doing leg elevation during the day.    She has had no chest discomfort.  She denies any dyspnea.  She is on CPAP therapy at night.  She is noted no palpitations or lightheadedness.    Past Medical History:   Diagnosis Date   • Bronchitis    • Diabetes mellitus type 2 in obese ([facility]) 10/11/2011   • DVT (deep vein thrombosis) in pregnancy ([facility]) 10/11/2011   • DVT (deep venous thrombosis) ([facility]) 10/11/2011   • Edema 10/11/2011   • GERD (gastroesophageal reflux disease) 10/11/2011   • Chinese measles    • HTN (hypertension) 10/11/2011   • Hypercoagulable state ([facility]) 10/11/2011   • Hyperlipemia 1/22/2013   • Hypertension    • Nasal drainage    • Obesity    • Palpitations 12/21/2011   • Sickle cell trait ([facility])      Past Surgical History:   Procedure Laterality Date   • OOPHORECTOMY  1984    RIGHT   •  LAPAROTOMY     • VENTRAL HERNIA REPAIR       Family History   Problem Relation Age of Onset   • Clotting Disorder Mother    • Kidney Disease Mother    • Cancer Mother         lung cancer   • Clotting Disorder Sister         blood clots, protein deficiency   • Cancer Father         Pancreatic  cancer   • Heart Failure Father    • Hypertension Sister      Social History     Social History   • Marital status: Single     Spouse name: N/A   • Number of children: N/A   • Years of education: N/A     Occupational History   • Not on file.     Social History Main Topics   • Smoking status: Current Every Day Smoker     Packs/day: 0.25     Years: 20.00     Types: Cigarettes   • Smokeless tobacco: Never Used      Comment: 5 Cigarettes a day   • Alcohol use No   • Drug use: No   • Sexual activity: Not on file     Other Topics Concern   • Not on file     Social History Narrative   • No narrative on file     Allergies   Allergen Reactions   • Erythromycin      Outpatient Encounter Prescriptions as of 8/17/2018   Medication Sig Dispense Refill   • vitamin D 75387 units Tab Take 50,000 Units by mouth every 7 days.     • atorvastatin (LIPITOR) 20 MG Tab Take 1 Tab by mouth every day. 30 Tab 11   • warfarin (COUMADIN) 5 MG Tab      • metformin (GLUCOPHAGE) 1000 MG tablet Take 1,000 mg by mouth 2 times a day.     • hydrochlorothiazide (HYDRODIURIL) 25 MG Tab Take 25 mg by mouth 2 Times a Day.     • lisinopril (PRINIVIL, ZESTRIL) 40 MG tablet      • glipiZIDE (GLUCOTROL) 10 MG Tab Take 10 mg by mouth every day.     • Coenzyme Q10 (CO Q 10 PO) Take  by mouth.     • carvedilol (COREG) 25 MG Tab Take 2 Tabs by mouth 2 times a day, with meals. 120 Tab 11   • potassium chloride SA (K-DUR) 20 MEQ TBCR Take 20 mEq by mouth as needed.     • fluticasone (FLONASE) 50 MCG/ACT nasal spray Spray 1 Spray in nose every day. Each Nostril     • albuterol (PROAIR HFA) 108 (90 BASE) MCG/ACT AERS Inhale 2 Puffs by mouth every 6 hours as needed.     •  "warfarin (COUMADIN) 4 MG TABS Take 4 mg by mouth every day.     • hydrocodone-acetaminophen (NORCO) 5-325 MG TABS per tablet Take 1-2 Tabs by mouth every four hours as needed.     • furosemide (LASIX) 40 MG TABS Take 1 Tab by mouth as needed. 10 Tab 3   • sitagliptin (JANUVIA) 100 MG TABS Take 100 mg by mouth every day.     • magnesium oxide (MAG-OX) 400 MG TABS Take 400 mg by mouth 2 times a day.     • omeprazole (PRILOSEC) 20 MG delayed-release capsule Take 20 mg by mouth 1 time daily as needed.     • polyethylene glycol 3350 (MIRALAX) Powder Take  by mouth 1 time daily as needed.       No facility-administered encounter medications on file as of 8/17/2018.      Review of Systems   All other systems reviewed and are negative.       Objective:   /82   Pulse 66   Ht 1.702 m (5' 7\")   Wt (!) 127 kg (280 lb)   SpO2 94%   BMI 43.85 kg/m²      Physical Exam   Constitutional: She appears well-developed and well-nourished.   Neck: No JVD present.   Cardiovascular: Normal rate and regular rhythm.    No murmur heard.  Pulmonary/Chest: Effort normal and breath sounds normal. She has no rales.   Abdominal: Soft. There is no tenderness.   Musculoskeletal: She exhibits edema (He does have 1+ pretibial edema bilaterally.  The left lower extremity is larger than the right and she may have some difficulty with lymphedema.).     Lab Results   Component Value Date/Time    CHOLSTRLTOT 145 05/09/2018 10:24 AM    CHOLSTRLTOT 143 01/08/2013 12:00 AM    LDL 69 05/09/2018 10:24 AM    LDL 70 01/08/2013 12:00 AM    HDL 46 05/09/2018 10:24 AM    HDL 48 01/08/2013 12:00 AM    TRIGLYCERIDE 151 (H) 05/09/2018 10:24 AM    TRIGLYCERIDE 125 01/08/2013 12:00 AM       Lab Results   Component Value Date/Time    SODIUM 139 05/09/2018 10:24 AM    SODIUM 138 04/20/2014 09:02 PM    POTASSIUM 3.8 05/09/2018 10:24 AM    POTASSIUM 3.4 (L) 04/20/2014 09:02 PM    CHLORIDE 95 (L) 05/09/2018 10:24 AM    CHLORIDE 101 04/20/2014 09:02 PM    CO2 28 " 05/09/2018 10:24 AM    CO2 29 04/20/2014 09:02 PM    GLUCOSE 110 (H) 05/09/2018 10:24 AM    GLUCOSE 143 (H) 04/20/2014 09:02 PM    BUN 10 05/09/2018 10:24 AM    BUN 12 04/20/2014 09:02 PM    CREATININE 0.93 05/09/2018 10:24 AM    CREATININE 1.24 04/20/2014 09:02 PM    CREATININE 1.05 (H) 01/08/2013 12:00 AM    BUNCREATRAT 11 05/09/2018 10:24 AM    BUNCREATRAT 12 01/08/2013 12:00 AM     Lab Results   Component Value Date/Time    ALKPHOSPHAT 71 05/09/2018 10:24 AM    ALKPHOSPHAT 85 01/08/2013 12:00 AM    ASTSGOT 14 05/09/2018 10:24 AM    ASTSGOT 19 01/08/2013 12:00 AM    ALTSGPT 20 05/09/2018 10:24 AM    ALTSGPT 26 01/08/2013 12:00 AM    TBILIRUBIN 0.3 05/09/2018 10:24 AM    TBILIRUBIN 0.5 01/08/2013 12:00 AM      No results found for: BNPBTYPENAT       Assessment:     1. Hypercoagulable state (HCC)     2. Essential hypertension     3. Localized edema     4. Palpitations     5. Dyslipidemia         Medical Decision Making:  Today's Assessment / Status / Plan:     Ms. Younger is clinically stable but continues to have difficulty with edema which is probably dependent in nature and somewhat related to her prior left lower extremity DVT.  We discussed elevation and compression stockings.  Her blood pressure may not be under optimal control and we will have her bring in her blood pressure device for correlation with the wall manometer.  She will then keep a record of her blood pressures 3 times a week.    She did not tolerate atorvastatin at the 40 mg dosage because of arthralgias.  Will try placing her on rosuvastatin 20 mg daily.  She will have lab work in about 6 weeks and follow-up in about 3 months.  She continues on chronic anticoagulation therapy for her hypercoagulable state.          SRIKANTH ZunigaP.N.  [address] [place] [address]  VIA Facsimile: [phone]

## 2018-08-17 NOTE — PROGRESS NOTES
Chief Complaint   Patient presents with   • HTN (Controlled)       Subjective:   Asha Younger is a 57 y.o. female who presents today for followup for hypertension and edema. She also has hyperlipidemia and a history of a hypercoagulable state. She is on chronic anticoagulation therapy.     After last visit, she did have an overnight pulse oximetry which showed nocturnal hypoxemia.  She was then evaluated by the sleep center and is now on CPAP therapy.    Her blood pressures at home have been running about 130/80.    She continues to have difficulty with edema.  At the time of her last visit we did reevaluate her with an ultrasound which was negative for DVT.  She has bilateral lower extremity edema though the left is greater than the right.  It is somewhat dependent in nature.  She has not been wearing compression stockings or doing leg elevation during the day.    She has had no chest discomfort.  She denies any dyspnea.  She is on CPAP therapy at night.  She is noted no palpitations or lightheadedness.    Past Medical History:   Diagnosis Date   • Bronchitis    • Diabetes mellitus type 2 in obese ([facility]) 10/11/2011   • DVT (deep vein thrombosis) in pregnancy ([facility]) 10/11/2011   • DVT (deep venous thrombosis) ([facility]) 10/11/2011   • Edema 10/11/2011   • GERD (gastroesophageal reflux disease) 10/11/2011   • French measles    • HTN (hypertension) 10/11/2011   • Hypercoagulable state ([facility]) 10/11/2011   • Hyperlipemia 1/22/2013   • Hypertension    • Nasal drainage    • Obesity    • Palpitations 12/21/2011   • Sickle cell trait ([facility])      Past Surgical History:   Procedure Laterality Date   • OOPHORECTOMY  1984    RIGHT   • LAPAROTOMY     • VENTRAL HERNIA REPAIR       Family History   Problem Relation Age of Onset   • Clotting Disorder Mother    • Kidney Disease Mother    • Cancer Mother         lung cancer   • Clotting Disorder Sister         blood clots, protein deficiency   • Cancer Father         Pancreatic  cancer    • Heart Failure Father    • Hypertension Sister      Social History     Social History   • Marital status: Single     Spouse name: N/A   • Number of children: N/A   • Years of education: N/A     Occupational History   • Not on file.     Social History Main Topics   • Smoking status: Current Every Day Smoker     Packs/day: 0.25     Years: 20.00     Types: Cigarettes   • Smokeless tobacco: Never Used      Comment: 5 Cigarettes a day   • Alcohol use No   • Drug use: No   • Sexual activity: Not on file     Other Topics Concern   • Not on file     Social History Narrative   • No narrative on file     Allergies   Allergen Reactions   • Erythromycin      Outpatient Encounter Prescriptions as of 8/17/2018   Medication Sig Dispense Refill   • vitamin D 02581 units Tab Take 50,000 Units by mouth every 7 days.     • atorvastatin (LIPITOR) 20 MG Tab Take 1 Tab by mouth every day. 30 Tab 11   • warfarin (COUMADIN) 5 MG Tab      • metformin (GLUCOPHAGE) 1000 MG tablet Take 1,000 mg by mouth 2 times a day.     • hydrochlorothiazide (HYDRODIURIL) 25 MG Tab Take 25 mg by mouth 2 Times a Day.     • lisinopril (PRINIVIL, ZESTRIL) 40 MG tablet      • glipiZIDE (GLUCOTROL) 10 MG Tab Take 10 mg by mouth every day.     • Coenzyme Q10 (CO Q 10 PO) Take  by mouth.     • carvedilol (COREG) 25 MG Tab Take 2 Tabs by mouth 2 times a day, with meals. 120 Tab 11   • potassium chloride SA (K-DUR) 20 MEQ TBCR Take 20 mEq by mouth as needed.     • fluticasone (FLONASE) 50 MCG/ACT nasal spray Spray 1 Spray in nose every day. Each Nostril     • albuterol (PROAIR HFA) 108 (90 BASE) MCG/ACT AERS Inhale 2 Puffs by mouth every 6 hours as needed.     • warfarin (COUMADIN) 4 MG TABS Take 4 mg by mouth every day.     • hydrocodone-acetaminophen (NORCO) 5-325 MG TABS per tablet Take 1-2 Tabs by mouth every four hours as needed.     • furosemide (LASIX) 40 MG TABS Take 1 Tab by mouth as needed. 10 Tab 3   • sitagliptin (JANUVIA) 100 MG TABS Take 100 mg by  "mouth every day.     • magnesium oxide (MAG-OX) 400 MG TABS Take 400 mg by mouth 2 times a day.     • omeprazole (PRILOSEC) 20 MG delayed-release capsule Take 20 mg by mouth 1 time daily as needed.     • polyethylene glycol 3350 (MIRALAX) Powder Take  by mouth 1 time daily as needed.       No facility-administered encounter medications on file as of 8/17/2018.      Review of Systems   All other systems reviewed and are negative.       Objective:   /82   Pulse 66   Ht 1.702 m (5' 7\")   Wt (!) 127 kg (280 lb)   SpO2 94%   BMI 43.85 kg/m²     Physical Exam   Constitutional: She appears well-developed and well-nourished.   Neck: No JVD present.   Cardiovascular: Normal rate and regular rhythm.    No murmur heard.  Pulmonary/Chest: Effort normal and breath sounds normal. She has no rales.   Abdominal: Soft. There is no tenderness.   Musculoskeletal: She exhibits edema (He does have 1+ pretibial edema bilaterally.  The left lower extremity is larger than the right and she may have some difficulty with lymphedema.).     Lab Results   Component Value Date/Time    CHOLSTRLTOT 145 05/09/2018 10:24 AM    CHOLSTRLTOT 143 01/08/2013 12:00 AM    LDL 69 05/09/2018 10:24 AM    LDL 70 01/08/2013 12:00 AM    HDL 46 05/09/2018 10:24 AM    HDL 48 01/08/2013 12:00 AM    TRIGLYCERIDE 151 (H) 05/09/2018 10:24 AM    TRIGLYCERIDE 125 01/08/2013 12:00 AM       Lab Results   Component Value Date/Time    SODIUM 139 05/09/2018 10:24 AM    SODIUM 138 04/20/2014 09:02 PM    POTASSIUM 3.8 05/09/2018 10:24 AM    POTASSIUM 3.4 (L) 04/20/2014 09:02 PM    CHLORIDE 95 (L) 05/09/2018 10:24 AM    CHLORIDE 101 04/20/2014 09:02 PM    CO2 28 05/09/2018 10:24 AM    CO2 29 04/20/2014 09:02 PM    GLUCOSE 110 (H) 05/09/2018 10:24 AM    GLUCOSE 143 (H) 04/20/2014 09:02 PM    BUN 10 05/09/2018 10:24 AM    BUN 12 04/20/2014 09:02 PM    CREATININE 0.93 05/09/2018 10:24 AM    CREATININE 1.24 04/20/2014 09:02 PM    CREATININE 1.05 (H) 01/08/2013 12:00 AM "    BUNCREATRAT 11 05/09/2018 10:24 AM    BUNCREATRAT 12 01/08/2013 12:00 AM     Lab Results   Component Value Date/Time    ALKPHOSPHAT 71 05/09/2018 10:24 AM    ALKPHOSPHAT 85 01/08/2013 12:00 AM    ASTSGOT 14 05/09/2018 10:24 AM    ASTSGOT 19 01/08/2013 12:00 AM    ALTSGPT 20 05/09/2018 10:24 AM    ALTSGPT 26 01/08/2013 12:00 AM    TBILIRUBIN 0.3 05/09/2018 10:24 AM    TBILIRUBIN 0.5 01/08/2013 12:00 AM      No results found for: BNPBTYPENAT       Assessment:     1. Hypercoagulable state (HCC)     2. Essential hypertension     3. Localized edema     4. Palpitations     5. Dyslipidemia         Medical Decision Making:  Today's Assessment / Status / Plan:     Ms. Younger is clinically stable but continues to have difficulty with edema which is probably dependent in nature and somewhat related to her prior left lower extremity DVT.  We discussed elevation and compression stockings.  Her blood pressure may not be under optimal control and we will have her bring in her blood pressure device for correlation with the wall manometer.  She will then keep a record of her blood pressures 3 times a week.    She did not tolerate atorvastatin at the 40 mg dosage because of arthralgias.  Will try placing her on rosuvastatin 20 mg daily.  She will have lab work in about 6 weeks and follow-up in about 3 months.  She continues on chronic anticoagulation therapy for her hypercoagulable state.

## 2018-08-21 ENCOUNTER — NON-PROVIDER VISIT (OUTPATIENT)
Dept: CARDIOLOGY | Facility: MEDICAL CENTER | Age: 58
End: 2018-08-21
Payer: MEDICARE

## 2018-08-21 VITALS
HEART RATE: 68 BPM | RESPIRATION RATE: 16 BRPM | OXYGEN SATURATION: 98 % | SYSTOLIC BLOOD PRESSURE: 144 MMHG | DIASTOLIC BLOOD PRESSURE: 90 MMHG

## 2018-08-21 NOTE — NON-PROVIDER
Patient came in today for a BP calibration with her home monitor.  The results are listed in the vitals section of the chart.  Note to nurse/physician to review.

## 2018-09-20 ENCOUNTER — SLEEP CENTER VISIT (OUTPATIENT)
Dept: SLEEP MEDICINE | Facility: MEDICAL CENTER | Age: 58
End: 2018-09-20
Payer: MEDICARE

## 2018-09-20 VITALS
BODY MASS INDEX: 44.1 KG/M2 | RESPIRATION RATE: 15 BRPM | OXYGEN SATURATION: 96 % | WEIGHT: 281 LBS | SYSTOLIC BLOOD PRESSURE: 132 MMHG | HEART RATE: 73 BPM | DIASTOLIC BLOOD PRESSURE: 82 MMHG | HEIGHT: 67 IN

## 2018-09-20 DIAGNOSIS — G47.33 OSA (OBSTRUCTIVE SLEEP APNEA): ICD-10-CM

## 2018-09-20 PROCEDURE — 99213 OFFICE O/P EST LOW 20 MIN: CPT | Performed by: FAMILY MEDICINE

## 2018-09-20 NOTE — PROGRESS NOTES
Subjective:      Asha Younger is a 57 y.o. female who presents with Follow-Up (4 week fv )               [facility] Follow Up Note     Date: 9/20/2018 / Time: 2:54 PM    Patient ID:   Name:             Asha Younger   YOB: 1960  Age:                 57 y.o.  female   MRN:               [id number]      Thank you for requesting a sleep medicine consultation on Asha Younger at the sleep center. She presents today with the chief complaints of MISAEL follow up.     HISTORY OF PRESENT ILLNESS:       Pt is currently on Auto BIPAPEPAP min 12 cm EPAP max 20 cm PS 4 cm . She goes to sleep around 9 pm and wakes up around 5-6 am. She is getting about 7 hrs of sleep on a good night and about 5 hr of sleep on a bad night. The bad nights are about 2 per week. She is using CPAP most days of the week. Pt reports 4 hrs of average nightly use of CPAP. Pt denies snoring, gasping,choking.Pt also denies significant mask leak that is interfering with sleep.      The 30 day compliance was downloaded which shows adequate compliance with more that 4 hr usage about 80%. The AHI is has improved to 0.9/hr. The mask leak is normal. The symptoms of excessive daytime, snoring and gasping has improved.         PMH:  has a past medical history of Bronchitis; Diabetes mellitus type 2 in obese ([facility]) (10/11/2011); DVT (deep vein thrombosis) in pregnancy ([facility]) (10/11/2011); DVT (deep venous thrombosis) ([facility]) (10/11/2011); Edema (10/11/2011); GERD (gastroesophageal reflux disease) (10/11/2011); [place] citizen measles; HTN (hypertension) (10/11/2011); Hypercoagulable state ([facility]) (10/11/2011); Hyperlipemia (1/22/2013); Hypertension; Nasal drainage; Obesity; Palpitations (12/21/2011); and Sickle cell trait ([facility]).  MEDS: Current Outpatient Prescriptions: •  rosuvastatin (CRESTOR) 20 MG Tab, Take 1 Tab by mouth every evening., Disp: 30 Tab, Rfl: 11•  vitamin D 26575 units Tab, Take 50,000 Units by mouth every 7 days., Disp: ,  Rfl: •  warfarin (COUMADIN) 5 MG Tab, , Disp: , Rfl: •  metformin (GLUCOPHAGE) 1000 MG tablet, Take 1,000 mg by mouth 2 times a day., Disp: , Rfl: •  hydrochlorothiazide (HYDRODIURIL) 25 MG Tab, Take 25 mg by mouth 2 Times a Day., Disp: , Rfl: •  lisinopril (PRINIVIL, ZESTRIL) 40 MG tablet, , Disp: , Rfl: •  glipiZIDE (GLUCOTROL) 10 MG Tab, Take 10 mg by mouth every day., Disp: , Rfl:  •  Coenzyme Q10 (CO Q 10 PO), Take  by mouth., Disp: , Rfl: •  carvedilol (COREG) 25 MG Tab, Take 2 Tabs by mouth 2 times a day, with meals., Disp: 120 Tab, Rfl: 11•  potassium chloride SA (K-DUR) 20 MEQ TBCR, Take 20 mEq by mouth as needed., Disp: , Rfl: •  fluticasone (FLONASE) 50 MCG/ACT nasal spray, Spray 1 Spray in nose every day. Each Nostril, Disp: , Rfl: •  albuterol (PROAIR HFA) 108 (90 BASE) MCG/ACT AERS, Inhale 2 Puffs by mouth every 6 hours as needed., Disp: , Rfl: •  warfarin (COUMADIN) 4 MG TABS, Take 4 mg by mouth every day., Disp: , Rfl: •  furosemide (LASIX) 40 MG TABS, Take 1 Tab by mouth as needed., Disp: 10 Tab, Rfl: 3•  sitagliptin (JANUVIA) 100 MG TABS, Take 100 mg by mouth every day., Disp: , Rfl: •  magnesium oxide (MAG-OX) 400 MG TABS, Take 400 mg by mouth 2 times a day., Disp: , Rfl: •  omeprazole (PRILOSEC) 20 MG delayed-release capsule, Take 20 mg by mouth 1 time daily as needed., Disp: , Rfl: •  polyethylene glycol 3350 (MIRALAX) Powder, Take  by mouth 1 time daily as needed., Disp: , Rfl: •  hydrocodone-acetaminophen (NORCO) 5-325 MG TABS per tablet, Take 1-2 Tabs by mouth every four hours as needed., Disp: , Rfl:   ALLERGIES:  -- Erythromycin   SURGHX: Past Surgical History:  1984: OOPHORECTOMY      Comment:  RIGHT  No date: LAPAROTOMY  No date: VENTRAL HERNIA REPAIR  SOCHX:  reports that she has been smoking Cigarettes.  She has a 5.00 pack-year smoking history. She has never used smokeless tobacco. She reports that she does not drink alcohol or use drugs..  FH: Review of patient's family history  "indicates:  Problem: Clotting Disorder      Relation: Mother       Age of Onset: (Not Specified)   Problem: Kidney Disease      Relation: Mother       Age of Onset: (Not Specified)   Problem: Cancer      Relation: Mother       Age of Onset: (Not Specified)       Comment: lung cancer  Problem: Clotting Disorder      Relation: Sister       Age of Onset: (Not Specified)       Comment: blood clots, protein deficiency  Problem: Cancer      Relation: Father       Age of Onset: (Not Specified)       Comment: Pancreatic  cancer  Problem: Heart Failure      Relation: Father       Age of Onset: (Not Specified)   Problem: Hypertension      Relation: Sister       Age of Onset: (Not Specified)                 ROS  Constitutional: Denies fevers, Denies weight changes  Eyes: Denies changes in vision, no eye pain  Ears/Nose/Throat/Mouth: sinus pressure and congestion  Cardiovascular: Denies chest pain or palpitations   Respiratory: Denies shortness of breath + cough  Gastrointestinal/Hepatic: Denies abdominal pain, nausea, vomiting, diarrhea, constipation or GI bleeding   Genitourinary: Denies bladder dysfunction, dysuria or frequency  Musculoskeletal/Rheum: Denies  joint pain and swelling   Skin/Breast: Denies rash,   Neurological: Denies headache, confusion, memory loss or focal weakness/parasthesias  Psychiatric: denies mood disorder        Objective:     /82 (BP Location: Right arm, Patient Position: Sitting, BP Cuff Size: Large adult)   Pulse 73   Resp 15   Ht 1.702 m (5' 7\")   Wt (!) 127.5 kg (281 lb)   SpO2 96%   BMI 44.01 kg/m²      Physical Exam   Constitutional: She is oriented to person, place, and time. She appears well-developed and well-nourished.   HENT:   Head: Normocephalic and atraumatic.   Crowded oropharynx   Eyes: EOM are normal. Right eye exhibits no discharge. Left eye exhibits no discharge.   Cardiovascular: Normal rate, regular rhythm and normal heart sounds.    No murmur " heard.  Pulmonary/Chest: Effort normal and breath sounds normal.   Neurological: She is alert and oriented to person, place, and time.   Skin: Skin is warm and dry.   Psychiatric: She has a normal mood and affect.               Assessment/Plan:     1. Sleep Apnea (MISAEL).    The pathophysiology of sleep anea and the increased risk of cardiovascular morbidity from untreated sleep apnea is discussed in detail with the patient.      She is urged to avoid supine sleep, weight gain and alcoholic beverages since all of these can worsen sleep apnea. She is cautioned against drowsy driving. If She feels sleepy while driving, She must pull over for a break/nap, rather than persist on the road, in the interest of She own safety and that of others on the road.   Plan   - Continue Auto BIPAPEPAP min 12 cm EPAP max 20 cm PS 4 cm     - compliance download was reviewed and discussed with the pt   - compliance was reinforced     2.Regarding treatment of other past medical problems and general health maintenance,  She is urged to follow up with PCP.

## 2018-12-12 ENCOUNTER — OFFICE VISIT (OUTPATIENT)
Dept: CARDIOLOGY | Facility: MEDICAL CENTER | Age: 58
End: 2018-12-12
Payer: MEDICARE

## 2018-12-12 VITALS
HEART RATE: 74 BPM | WEIGHT: 282 LBS | OXYGEN SATURATION: 97 % | DIASTOLIC BLOOD PRESSURE: 80 MMHG | HEIGHT: 67 IN | SYSTOLIC BLOOD PRESSURE: 150 MMHG | BODY MASS INDEX: 44.26 KG/M2

## 2018-12-12 DIAGNOSIS — D68.59 HYPERCOAGULABLE STATE (HCC): ICD-10-CM

## 2018-12-12 DIAGNOSIS — I10 ESSENTIAL HYPERTENSION: ICD-10-CM

## 2018-12-12 DIAGNOSIS — R00.2 PALPITATIONS: ICD-10-CM

## 2018-12-12 DIAGNOSIS — Z86.718 HISTORY OF RECURRENT DEEP VEIN THROMBOSIS (DVT): ICD-10-CM

## 2018-12-12 DIAGNOSIS — E78.5 DYSLIPIDEMIA: ICD-10-CM

## 2018-12-12 PROCEDURE — 99214 OFFICE O/P EST MOD 30 MIN: CPT | Performed by: INTERNAL MEDICINE

## 2018-12-12 RX ORDER — SPIRONOLACTONE 25 MG/1
12.5 TABLET ORAL DAILY
Qty: 15 TAB | Refills: 11 | Status: SHIPPED | OUTPATIENT
Start: 2018-12-12 | End: 2019-03-26

## 2018-12-12 RX ORDER — SPIRONOLACTONE 25 MG/1
25 TABLET ORAL DAILY
Qty: 30 TAB | Refills: 3 | Status: SHIPPED | OUTPATIENT
Start: 2018-12-12 | End: 2018-12-12

## 2018-12-12 NOTE — LETTER
[facility]   [address] [place] [address]  [place] [address]  Phone: [phone]  Fax: [phone]              Asha Younger  1960    Encounter Date: 12/12/2018    Ramsey Gill M.D.          PROGRESS NOTE:  Chief Complaint   Patient presents with   • Hypertension     F/V: 3 MO       Subjective:   Asha Younger is a 58 y.o. female who presents today for followup for hypertension and edema. She also has hyperlipidemia and a history of a hypercoagulable state. She is on chronic anticoagulation therapy.     She had an episode of palpitations with skipping of her heart last month.  However, these episodes have been very rare.  She has had multiple episodes of DVT and has chronic edema.  She denies any chest discomfort, dyspnea or lightheadedness.    Her blood pressures at home been running about 140/80.  Throat    Past Medical History:   Diagnosis Date   • Bronchitis    • Diabetes mellitus type 2 in obese ([facility]) 10/11/2011   • DVT (deep vein thrombosis) in pregnancy ([facility]) 10/11/2011   • DVT (deep venous thrombosis) ([facility]) 10/11/2011   • Edema 10/11/2011   • GERD (gastroesophageal reflux disease) 10/11/2011   • [place] measles    • HTN (hypertension) 10/11/2011   • Hypercoagulable state ([facility]) 10/11/2011   • Hyperlipemia 1/22/2013   • Hypertension    • Nasal drainage    • Obesity    • Palpitations 12/21/2011   • Sickle cell trait ([facility])      Past Surgical History:   Procedure Laterality Date   • OOPHORECTOMY  1984    RIGHT   • LAPAROTOMY     • VENTRAL HERNIA REPAIR       Family History   Problem Relation Age of Onset   • Clotting Disorder Mother    • Kidney Disease Mother    • Cancer Mother         lung cancer   • Clotting Disorder Sister         blood clots, protein deficiency   • Cancer Father         Pancreatic  cancer   • Heart Failure Father    • Hypertension Sister      Social History     Social History   • Marital status: Single     Spouse name: N/A   •  Number of children: N/A   • Years of education: N/A     Occupational History   • Not on file.     Social History Main Topics   • Smoking status: Current Every Day Smoker     Packs/day: 0.25     Years: 20.00     Types: Cigarettes   • Smokeless tobacco: Never Used      Comment: 5 Cigarettes a day   • Alcohol use No   • Drug use: No   • Sexual activity: Not on file     Other Topics Concern   • Not on file     Social History Narrative   • No narrative on file     Allergies   Allergen Reactions   • Erythromycin      Outpatient Encounter Prescriptions as of 12/12/2018   Medication Sig Dispense Refill   • rosuvastatin (CRESTOR) 20 MG Tab Take 1 Tab by mouth every evening. 30 Tab 11   • vitamin D 26240 units Tab Take 50,000 Units by mouth every 7 days.     • warfarin (COUMADIN) 5 MG Tab      • metformin (GLUCOPHAGE) 1000 MG tablet Take 1,000 mg by mouth 2 times a day.     • hydrochlorothiazide (HYDRODIURIL) 25 MG Tab Take 25 mg by mouth 2 Times a Day.     • lisinopril (PRINIVIL, ZESTRIL) 40 MG tablet Take 40 mg by mouth every day.     • glipiZIDE (GLUCOTROL) 10 MG Tab Take 10 mg by mouth every day.     • Coenzyme Q10 (CO Q 10 PO) Take  by mouth.     • carvedilol (COREG) 25 MG Tab Take 2 Tabs by mouth 2 times a day, with meals. 120 Tab 11   • fluticasone (FLONASE) 50 MCG/ACT nasal spray Spray 1 Spray in nose every day. Each Nostril     • warfarin (COUMADIN) 4 MG TABS Take 4 mg by mouth every day.     • sitagliptin (JANUVIA) 100 MG TABS Take 100 mg by mouth every day.     • magnesium oxide (MAG-OX) 400 MG TABS Take 400 mg by mouth 2 times a day.     • omeprazole (PRILOSEC) 20 MG delayed-release capsule Take 20 mg by mouth 1 time daily as needed.     • polyethylene glycol 3350 (MIRALAX) Powder Take  by mouth 1 time daily as needed.     • potassium chloride SA (K-DUR) 20 MEQ TBCR Take 20 mEq by mouth as needed.     • albuterol (PROAIR HFA) 108 (90 BASE) MCG/ACT AERS Inhale 2 Puffs by mouth every 6 hours as needed.     •  "hydrocodone-acetaminophen (NORCO) 5-325 MG TABS per tablet Take 1-2 Tabs by mouth every four hours as needed.     • furosemide (LASIX) 40 MG TABS Take 1 Tab by mouth as needed. 10 Tab 3     No facility-administered encounter medications on file as of 12/12/2018.      ROS     Objective:   /80 (BP Location: Left arm, Patient Position: Sitting, BP Cuff Size: Large adult)   Pulse 74   Ht 1.702 m (5' 7\")   Wt (!) 127.9 kg (282 lb)   SpO2 97%   BMI 44.17 kg/m²      Physical Exam   Constitutional: She appears well-developed and well-nourished.   Neck: No JVD present.   Cardiovascular: Normal rate and regular rhythm.   Occasional extrasystoles are present.   No murmur heard.  Pulmonary/Chest: Effort normal and breath sounds normal. She has no rales.   Abdominal: Soft. There is no tenderness.   Musculoskeletal: She exhibits edema (Trace pretibial).     Lab Results   Component Value Date/Time    CHOLSTRLTOT 145 05/09/2018 10:24 AM    CHOLSTRLTOT 143 01/08/2013 12:00 AM    LDL 69 05/09/2018 10:24 AM    LDL 70 01/08/2013 12:00 AM    HDL 46 05/09/2018 10:24 AM    HDL 48 01/08/2013 12:00 AM    TRIGLYCERIDE 151 (H) 05/09/2018 10:24 AM    TRIGLYCERIDE 125 01/08/2013 12:00 AM       Lab Results   Component Value Date/Time    SODIUM 139 05/09/2018 10:24 AM    SODIUM 138 04/20/2014 09:02 PM    POTASSIUM 3.8 05/09/2018 10:24 AM    POTASSIUM 3.4 (L) 04/20/2014 09:02 PM    CHLORIDE 95 (L) 05/09/2018 10:24 AM    CHLORIDE 101 04/20/2014 09:02 PM    CO2 28 05/09/2018 10:24 AM    CO2 29 04/20/2014 09:02 PM    GLUCOSE 110 (H) 05/09/2018 10:24 AM    GLUCOSE 143 (H) 04/20/2014 09:02 PM    BUN 10 05/09/2018 10:24 AM    BUN 12 04/20/2014 09:02 PM    CREATININE 0.93 05/09/2018 10:24 AM    CREATININE 1.24 04/20/2014 09:02 PM    CREATININE 1.05 (H) 01/08/2013 12:00 AM    BUNCREATRAT 11 05/09/2018 10:24 AM    BUNCREATRAT 12 01/08/2013 12:00 AM     Lab Results   Component Value Date/Time    ALKPHOSPHAT 71 05/09/2018 10:24 AM    ALKPHOSPHAT " 85 01/08/2013 12:00 AM    ASTSGOT 14 05/09/2018 10:24 AM    ASTSGOT 19 01/08/2013 12:00 AM    ALTSGPT 20 05/09/2018 10:24 AM    ALTSGPT 26 01/08/2013 12:00 AM    TBILIRUBIN 0.3 05/09/2018 10:24 AM    TBILIRUBIN 0.5 01/08/2013 12:00 AM      No results found for: BNPBTYPENAT       Assessment:     1. History of recurrent deep vein thrombosis (DVT)     2. Hypercoagulable state (HCC)     3. Essential hypertension     4. Palpitations     5. Dyslipidemia         Medical Decision Making:  Today's Assessment / Status / Plan:     Ms. Younger is clinically stable.  She is limited somewhat by artery any complaints and her ability to ambulate.  Her blood pressure is not under optimal control and we will start her on spironolactone 12.5 mg daily.  She will discontinue potassium supplementation.  She will have a BMP in about a week in about 3 weeks.  She will follow-up in 6-8 weeks.  She has not had a recent echocardiogram and one will be obtained.  Her palpitations seem to be well controlled but will probably improve even more with an increase in potassium level.  She continues on chronic anticoagulation for her recurrent DVT.  Her lipid status appears to be under good control.      SRIKANTH ZunigaPEDWINA.  [address] [place] [address]  VIA Facsimile: [phone]

## 2018-12-12 NOTE — PROGRESS NOTES
Chief Complaint   Patient presents with   • Hypertension     F/V: 3 MO       Subjective:   Asha Younger is a 58 y.o. female who presents today for followup for hypertension and edema. She also has hyperlipidemia and a history of a hypercoagulable state. She is on chronic anticoagulation therapy.     She had an episode of palpitations with skipping of her heart last month.  However, these episodes have been very rare.  She has had multiple episodes of DVT and has chronic edema.  She denies any chest discomfort, dyspnea or lightheadedness.    Her blood pressures at home been running about 140/80.  Throat    Past Medical History:   Diagnosis Date   • Bronchitis    • Diabetes mellitus type 2 in obese ([facility]) 10/11/2011   • DVT (deep vein thrombosis) in pregnancy ([facility]) 10/11/2011   • DVT (deep venous thrombosis) ([facility]) 10/11/2011   • Edema 10/11/2011   • GERD (gastroesophageal reflux disease) 10/11/2011   • [place] measles    • HTN (hypertension) 10/11/2011   • Hypercoagulable state ([facility]) 10/11/2011   • Hyperlipemia 1/22/2013   • Hypertension    • Nasal drainage    • Obesity    • Palpitations 12/21/2011   • Sickle cell trait ([facility])      Past Surgical History:   Procedure Laterality Date   • OOPHORECTOMY  1984    RIGHT   • LAPAROTOMY     • VENTRAL HERNIA REPAIR       Family History   Problem Relation Age of Onset   • Clotting Disorder Mother    • Kidney Disease Mother    • Cancer Mother         lung cancer   • Clotting Disorder Sister         blood clots, protein deficiency   • Cancer Father         Pancreatic  cancer   • Heart Failure Father    • Hypertension Sister      Social History     Social History   • Marital status: Single     Spouse name: N/A   • Number of children: N/A   • Years of education: N/A     Occupational History   • Not on file.     Social History Main Topics   • Smoking status: Current Every Day Smoker     Packs/day: 0.25     Years: 20.00     Types: Cigarettes   • Smokeless tobacco: Never Used       Comment: 5 Cigarettes a day   • Alcohol use No   • Drug use: No   • Sexual activity: Not on file     Other Topics Concern   • Not on file     Social History Narrative   • No narrative on file     Allergies   Allergen Reactions   • Erythromycin      Outpatient Encounter Prescriptions as of 12/12/2018   Medication Sig Dispense Refill   • rosuvastatin (CRESTOR) 20 MG Tab Take 1 Tab by mouth every evening. 30 Tab 11   • vitamin D 66984 units Tab Take 50,000 Units by mouth every 7 days.     • warfarin (COUMADIN) 5 MG Tab      • metformin (GLUCOPHAGE) 1000 MG tablet Take 1,000 mg by mouth 2 times a day.     • hydrochlorothiazide (HYDRODIURIL) 25 MG Tab Take 25 mg by mouth 2 Times a Day.     • lisinopril (PRINIVIL, ZESTRIL) 40 MG tablet Take 40 mg by mouth every day.     • glipiZIDE (GLUCOTROL) 10 MG Tab Take 10 mg by mouth every day.     • Coenzyme Q10 (CO Q 10 PO) Take  by mouth.     • carvedilol (COREG) 25 MG Tab Take 2 Tabs by mouth 2 times a day, with meals. 120 Tab 11   • fluticasone (FLONASE) 50 MCG/ACT nasal spray Spray 1 Spray in nose every day. Each Nostril     • warfarin (COUMADIN) 4 MG TABS Take 4 mg by mouth every day.     • sitagliptin (JANUVIA) 100 MG TABS Take 100 mg by mouth every day.     • magnesium oxide (MAG-OX) 400 MG TABS Take 400 mg by mouth 2 times a day.     • omeprazole (PRILOSEC) 20 MG delayed-release capsule Take 20 mg by mouth 1 time daily as needed.     • polyethylene glycol 3350 (MIRALAX) Powder Take  by mouth 1 time daily as needed.     • potassium chloride SA (K-DUR) 20 MEQ TBCR Take 20 mEq by mouth as needed.     • albuterol (PROAIR HFA) 108 (90 BASE) MCG/ACT AERS Inhale 2 Puffs by mouth every 6 hours as needed.     • hydrocodone-acetaminophen (NORCO) 5-325 MG TABS per tablet Take 1-2 Tabs by mouth every four hours as needed.     • furosemide (LASIX) 40 MG TABS Take 1 Tab by mouth as needed. 10 Tab 3     No facility-administered encounter medications on file as of 12/12/2018.      ROS      "Objective:   /80 (BP Location: Left arm, Patient Position: Sitting, BP Cuff Size: Large adult)   Pulse 74   Ht 1.702 m (5' 7\")   Wt (!) 127.9 kg (282 lb)   SpO2 97%   BMI 44.17 kg/m²     Physical Exam   Constitutional: She appears well-developed and well-nourished.   Neck: No JVD present.   Cardiovascular: Normal rate and regular rhythm.   Occasional extrasystoles are present.   No murmur heard.  Pulmonary/Chest: Effort normal and breath sounds normal. She has no rales.   Abdominal: Soft. There is no tenderness.   Musculoskeletal: She exhibits edema (Trace pretibial).     Lab Results   Component Value Date/Time    CHOLSTRLTOT 145 05/09/2018 10:24 AM    CHOLSTRLTOT 143 01/08/2013 12:00 AM    LDL 69 05/09/2018 10:24 AM    LDL 70 01/08/2013 12:00 AM    HDL 46 05/09/2018 10:24 AM    HDL 48 01/08/2013 12:00 AM    TRIGLYCERIDE 151 (H) 05/09/2018 10:24 AM    TRIGLYCERIDE 125 01/08/2013 12:00 AM       Lab Results   Component Value Date/Time    SODIUM 139 05/09/2018 10:24 AM    SODIUM 138 04/20/2014 09:02 PM    POTASSIUM 3.8 05/09/2018 10:24 AM    POTASSIUM 3.4 (L) 04/20/2014 09:02 PM    CHLORIDE 95 (L) 05/09/2018 10:24 AM    CHLORIDE 101 04/20/2014 09:02 PM    CO2 28 05/09/2018 10:24 AM    CO2 29 04/20/2014 09:02 PM    GLUCOSE 110 (H) 05/09/2018 10:24 AM    GLUCOSE 143 (H) 04/20/2014 09:02 PM    BUN 10 05/09/2018 10:24 AM    BUN 12 04/20/2014 09:02 PM    CREATININE 0.93 05/09/2018 10:24 AM    CREATININE 1.24 04/20/2014 09:02 PM    CREATININE 1.05 (H) 01/08/2013 12:00 AM    BUNCREATRAT 11 05/09/2018 10:24 AM    BUNCREATRAT 12 01/08/2013 12:00 AM     Lab Results   Component Value Date/Time    ALKPHOSPHAT 71 05/09/2018 10:24 AM    ALKPHOSPHAT 85 01/08/2013 12:00 AM    ASTSGOT 14 05/09/2018 10:24 AM    ASTSGOT 19 01/08/2013 12:00 AM    ALTSGPT 20 05/09/2018 10:24 AM    ALTSGPT 26 01/08/2013 12:00 AM    TBILIRUBIN 0.3 05/09/2018 10:24 AM    TBILIRUBIN 0.5 01/08/2013 12:00 AM      No results found for: BNPBTYPENAT "       Assessment:     1. History of recurrent deep vein thrombosis (DVT)     2. Hypercoagulable state (HCC)     3. Essential hypertension     4. Palpitations     5. Dyslipidemia         Medical Decision Making:  Today's Assessment / Status / Plan:     Ms. Younger is clinically stable.  She is limited somewhat by orthopedic complaints and her inability to ambulate.  Her blood pressure is not under optimal control and we will start her on spironolactone 12.5 mg daily.  She will discontinue potassium supplementation.  She will have a BMP in about a week in about 3 weeks.  She will follow-up in 6-8 weeks.  She has not had a recent echocardiogram and one will be obtained.  Her palpitations seem to be well controlled but will probably improve even more with an increase in potassium level.  She continues on chronic anticoagulation for her recurrent DVT.  Her lipid status appears to be under good control.

## 2018-12-25 LAB
BUN SERPL-MCNC: 13 MG/DL (ref 6–24)
BUN/CREAT SERPL: 12 (ref 9–23)
CALCIUM SERPL-MCNC: 9.6 MG/DL (ref 8.7–10.2)
CHLORIDE SERPL-SCNC: 99 MMOL/L (ref 96–106)
CO2 SERPL-SCNC: 24 MMOL/L (ref 20–29)
CREAT SERPL-MCNC: 1.1 MG/DL (ref 0.57–1)
GLUCOSE SERPL-MCNC: 166 MG/DL (ref 65–99)
IF AFRICAN AMERICAN  100797: 64 ML/MIN/1.73
IF NON AFRICAN AMER 100791: 55 ML/MIN/1.73
POTASSIUM SERPL-SCNC: 4.5 MMOL/L (ref 3.5–5.2)
SODIUM SERPL-SCNC: 138 MMOL/L (ref 134–144)

## 2019-01-16 DIAGNOSIS — I10 ESSENTIAL HYPERTENSION: ICD-10-CM

## 2019-03-18 ENCOUNTER — APPOINTMENT (OUTPATIENT)
Dept: SLEEP MEDICINE | Facility: MEDICAL CENTER | Age: 59
End: 2019-03-18
Payer: MEDICARE

## 2019-03-26 ENCOUNTER — OFFICE VISIT (OUTPATIENT)
Dept: CARDIOLOGY | Facility: MEDICAL CENTER | Age: 59
End: 2019-03-26
Payer: MEDICARE

## 2019-03-26 VITALS
DIASTOLIC BLOOD PRESSURE: 84 MMHG | BODY MASS INDEX: 44.73 KG/M2 | HEIGHT: 67 IN | HEART RATE: 68 BPM | WEIGHT: 285 LBS | OXYGEN SATURATION: 95 % | SYSTOLIC BLOOD PRESSURE: 130 MMHG

## 2019-03-26 DIAGNOSIS — I10 ESSENTIAL HYPERTENSION: ICD-10-CM

## 2019-03-26 DIAGNOSIS — D68.59 HYPERCOAGULABLE STATE (HCC): ICD-10-CM

## 2019-03-26 DIAGNOSIS — E78.5 DYSLIPIDEMIA: ICD-10-CM

## 2019-03-26 PROCEDURE — 99214 OFFICE O/P EST MOD 30 MIN: CPT | Performed by: INTERNAL MEDICINE

## 2019-03-26 NOTE — PROGRESS NOTES
Chief Complaint   Patient presents with   • Deep Vein Thrombosis     F/V: 3 MO       Subjective:   Asha Younger is a 58 y.o. female who presents today for followup for hypertension and edema. She also has hyperlipidemia and a history of a hypercoagulable state. She is on chronic anticoagulation therapy.    Her blood pressure was not under optimal control and we added Spironolactone.  With this she noted significant cramping in her hands.  She therefore quit this medication.  Blood pressures at home been running approximately 130-135/80.    She has had no anginal type chest discomfort.  She denies any dyspnea on exertion on a routine walk.  She has had no PND orthopnea.  She does have difficulty with chronic edema because of recurrent DVT.  She denies any recurrent palpitations or lightheadedness.    Past Medical History:   Diagnosis Date   • Bronchitis    • Diabetes mellitus type 2 in obese ([facility]) 10/11/2011   • DVT (deep vein thrombosis) in pregnancy ([facility]) 10/11/2011   • DVT (deep venous thrombosis) ([facility]) 10/11/2011   • Edema 10/11/2011   • GERD (gastroesophageal reflux disease) 10/11/2011   • [place] measles    • HTN (hypertension) 10/11/2011   • Hypercoagulable state ([facility]) 10/11/2011   • Hyperlipemia 1/22/2013   • Hypertension    • Nasal drainage    • Obesity    • Palpitations 12/21/2011   • Sickle cell trait ([facility])      Past Surgical History:   Procedure Laterality Date   • OOPHORECTOMY  1984    RIGHT   • LAPAROTOMY     • VENTRAL HERNIA REPAIR       Family History   Problem Relation Age of Onset   • Clotting Disorder Mother    • Kidney Disease Mother    • Cancer Mother         lung cancer   • Clotting Disorder Sister         blood clots, protein deficiency   • Cancer Father         Pancreatic  cancer   • Heart Failure Father    • Hypertension Sister      Social History     Social History   • Marital status: Single     Spouse name: N/A   • Number of children: N/A   • Years of education: N/A     Occupational  History   • Not on file.     Social History Main Topics   • Smoking status: Current Every Day Smoker     Packs/day: 0.25     Years: 20.00     Types: Cigarettes   • Smokeless tobacco: Never Used      Comment: 5 Cigarettes a day   • Alcohol use No   • Drug use: No   • Sexual activity: Not on file     Other Topics Concern   • Not on file     Social History Narrative   • No narrative on file     Allergies   Allergen Reactions   • Erythromycin      Outpatient Encounter Prescriptions as of 3/26/2019   Medication Sig Dispense Refill   • rosuvastatin (CRESTOR) 20 MG Tab Take 1 Tab by mouth every evening. 30 Tab 11   • warfarin (COUMADIN) 5 MG Tab      • metformin (GLUCOPHAGE) 1000 MG tablet Take 1,000 mg by mouth 2 times a day.     • hydrochlorothiazide (HYDRODIURIL) 25 MG Tab Take 25 mg by mouth 2 Times a Day.     • lisinopril (PRINIVIL, ZESTRIL) 40 MG tablet Take 40 mg by mouth every day.     • glipiZIDE (GLUCOTROL) 10 MG Tab Take 10 mg by mouth every day.     • Coenzyme Q10 (CO Q 10 PO) Take  by mouth every day.     • carvedilol (COREG) 25 MG Tab Take 2 Tabs by mouth 2 times a day, with meals. 120 Tab 11   • fluticasone (FLONASE) 50 MCG/ACT nasal spray Spray 1 Spray in nose every day. Each Nostril     • warfarin (COUMADIN) 4 MG TABS Take 4 mg by mouth every day.     • sitagliptin (JANUVIA) 100 MG TABS Take 100 mg by mouth every day.     • magnesium oxide (MAG-OX) 400 MG TABS Take 400 mg by mouth 2 times a day.     • [DISCONTINUED] spironolactone (ALDACTONE) 25 MG Tab Take 0.5 Tabs by mouth every day. (Patient not taking: Reported on 3/26/2019) 15 Tab 11   • [DISCONTINUED] vitamin D 39549 units Tab Take 50,000 Units by mouth every 7 days.     • [DISCONTINUED] omeprazole (PRILOSEC) 20 MG delayed-release capsule Take 20 mg by mouth 1 time daily as needed.     • [DISCONTINUED] polyethylene glycol 3350 (MIRALAX) Powder Take  by mouth 1 time daily as needed.     • albuterol (PROAIR HFA) 108 (90 BASE) MCG/ACT AERS Inhale 2  "Puffs by mouth every 6 hours as needed.     • hydrocodone-acetaminophen (NORCO) 5-325 MG TABS per tablet Take 1-2 Tabs by mouth every four hours as needed.     • furosemide (LASIX) 40 MG TABS Take 1 Tab by mouth as needed. 10 Tab 3     No facility-administered encounter medications on file as of 3/26/2019.      ROS     Objective:   /84 (BP Location: Left arm, Patient Position: Sitting, BP Cuff Size: Large adult)   Pulse 68   Ht 1.702 m (5' 7\")   Wt (!) 129.3 kg (285 lb)   SpO2 95%   BMI 44.64 kg/m²     Physical Exam   Constitutional: She appears well-developed and well-nourished.   Neck: No JVD present.   Cardiovascular: Normal rate and regular rhythm.    No murmur heard.  Pulmonary/Chest: Effort normal and breath sounds normal. She has no rales.   Abdominal: Soft. There is no tenderness.   Musculoskeletal: She exhibits no edema.     Laboratory from January 16th 2019: BUN 13, creatinine 0.9 and potassium 3.9.    Assessment:     1. Essential hypertension     2. Hypercoagulable state (HCC)     3. Dyslipidemia         Medical Decision Making:  Today's Assessment / Status / Plan:     Ms. Younger is clinically stable.  Her blood pressure appears to be under good control and her palpitations have resolved.  She continues on chronic anticoagulation therapy for her hypercoagulable state.  She will follow-up in about 6 months with a lipid and CMP to reevaluate her lipid status.  She is on high-dose rosuvastatin.  "

## 2019-03-26 NOTE — LETTER
[facility]   [address] [place] [address]  [place] [address]  Phone: [phone]  Fax: [phone]              Asha Younger  1960    Encounter Date: 3/26/2019    Ramsey Gill M.D.          PROGRESS NOTE:  Chief Complaint   Patient presents with   • Deep Vein Thrombosis     F/V: 3 MO       Subjective:   Asha Younger is a 58 y.o. female who presents today for followup for hypertension and edema. She also has hyperlipidemia and a history of a hypercoagulable state. She is on chronic anticoagulation therapy.    Her blood pressure was not under optimal control and we added Spironolactone.  With this she noted significant cramping in her hands.  She therefore quit this medication.  Blood pressures at home been running approximately 130-135/80.    She has had no anginal type chest discomfort.  She denies any dyspnea on exertion on a routine walk.  She has had no PND orthopnea.  She does have difficulty with chronic edema because of recurrent DVT.  She denies any recurrent palpitations or lightheadedness.    Past Medical History:   Diagnosis Date   • Bronchitis    • Diabetes mellitus type 2 in obese ([facility]) 10/11/2011   • DVT (deep vein thrombosis) in pregnancy ([facility]) 10/11/2011   • DVT (deep venous thrombosis) ([facility]) 10/11/2011   • Edema 10/11/2011   • GERD (gastroesophageal reflux disease) 10/11/2011   • [place] measles    • HTN (hypertension) 10/11/2011   • Hypercoagulable state ([facility]) 10/11/2011   • Hyperlipemia 1/22/2013   • Hypertension    • Nasal drainage    • Obesity    • Palpitations 12/21/2011   • Sickle cell trait ([facility])      Past Surgical History:   Procedure Laterality Date   • OOPHORECTOMY  1984    RIGHT   • LAPAROTOMY     • VENTRAL HERNIA REPAIR       Family History   Problem Relation Age of Onset   • Clotting Disorder Mother    • Kidney Disease Mother    • Cancer Mother         lung cancer   • Clotting Disorder Sister         blood clots, protein  deficiency   • Cancer Father         Pancreatic  cancer   • Heart Failure Father    • Hypertension Sister      Social History     Social History   • Marital status: Single     Spouse name: N/A   • Number of children: N/A   • Years of education: N/A     Occupational History   • Not on file.     Social History Main Topics   • Smoking status: Current Every Day Smoker     Packs/day: 0.25     Years: 20.00     Types: Cigarettes   • Smokeless tobacco: Never Used      Comment: 5 Cigarettes a day   • Alcohol use No   • Drug use: No   • Sexual activity: Not on file     Other Topics Concern   • Not on file     Social History Narrative   • No narrative on file     Allergies   Allergen Reactions   • Erythromycin      Outpatient Encounter Prescriptions as of 3/26/2019   Medication Sig Dispense Refill   • rosuvastatin (CRESTOR) 20 MG Tab Take 1 Tab by mouth every evening. 30 Tab 11   • warfarin (COUMADIN) 5 MG Tab      • metformin (GLUCOPHAGE) 1000 MG tablet Take 1,000 mg by mouth 2 times a day.     • hydrochlorothiazide (HYDRODIURIL) 25 MG Tab Take 25 mg by mouth 2 Times a Day.     • lisinopril (PRINIVIL, ZESTRIL) 40 MG tablet Take 40 mg by mouth every day.     • glipiZIDE (GLUCOTROL) 10 MG Tab Take 10 mg by mouth every day.     • Coenzyme Q10 (CO Q 10 PO) Take  by mouth every day.     • carvedilol (COREG) 25 MG Tab Take 2 Tabs by mouth 2 times a day, with meals. 120 Tab 11   • fluticasone (FLONASE) 50 MCG/ACT nasal spray Spray 1 Spray in nose every day. Each Nostril     • warfarin (COUMADIN) 4 MG TABS Take 4 mg by mouth every day.     • sitagliptin (JANUVIA) 100 MG TABS Take 100 mg by mouth every day.     • magnesium oxide (MAG-OX) 400 MG TABS Take 400 mg by mouth 2 times a day.     • [DISCONTINUED] spironolactone (ALDACTONE) 25 MG Tab Take 0.5 Tabs by mouth every day. (Patient not taking: Reported on 3/26/2019) 15 Tab 11   • [DISCONTINUED] vitamin D 62154 units Tab Take 50,000 Units by mouth every 7 days.     • [DISCONTINUED]  "omeprazole (PRILOSEC) 20 MG delayed-release capsule Take 20 mg by mouth 1 time daily as needed.     • [DISCONTINUED] polyethylene glycol 3350 (MIRALAX) Powder Take  by mouth 1 time daily as needed.     • albuterol (PROAIR HFA) 108 (90 BASE) MCG/ACT AERS Inhale 2 Puffs by mouth every 6 hours as needed.     • hydrocodone-acetaminophen (NORCO) 5-325 MG TABS per tablet Take 1-2 Tabs by mouth every four hours as needed.     • furosemide (LASIX) 40 MG TABS Take 1 Tab by mouth as needed. 10 Tab 3     No facility-administered encounter medications on file as of 3/26/2019.      ROS     Objective:   /84 (BP Location: Left arm, Patient Position: Sitting, BP Cuff Size: Large adult)   Pulse 68   Ht 1.702 m (5' 7\")   Wt (!) 129.3 kg (285 lb)   SpO2 95%   BMI 44.64 kg/m²      Physical Exam   Constitutional: She appears well-developed and well-nourished.   Neck: No JVD present.   Cardiovascular: Normal rate and regular rhythm.    No murmur heard.  Pulmonary/Chest: Effort normal and breath sounds normal. She has no rales.   Abdominal: Soft. There is no tenderness.   Musculoskeletal: She exhibits no edema.     Laboratory from January 16th 2019: BUN 13, creatinine 0.9 and potassium 3.9.    Assessment:     1. Essential hypertension     2. Hypercoagulable state (HCC)     3. Dyslipidemia         Medical Decision Making:  Today's Assessment / Status / Plan:     Ms. Younger is clinically stable.  Her blood pressure appears to be under good control and her palpitations have resolved.  She continues on chronic anticoagulation therapy for her hypercoagulable state.  She will follow-up in about 6 months with a lipid and CMP to reevaluate her lipid status.  She is on high-dose rosuvastatin.      Dorota Ball AAriannaPAriannaN.  [address] [place] [address]  VIA Facsimile: [phone]                 "

## 2019-04-02 DIAGNOSIS — R00.2 PALPITATIONS: ICD-10-CM

## 2019-04-02 DIAGNOSIS — I10 ESSENTIAL HYPERTENSION: ICD-10-CM

## 2019-04-20 ENCOUNTER — APPOINTMENT (OUTPATIENT)
Dept: RADIOLOGY | Facility: MEDICAL CENTER | Age: 59
End: 2019-04-20
Attending: EMERGENCY MEDICINE
Payer: MEDICARE

## 2019-04-20 ENCOUNTER — HOSPITAL ENCOUNTER (EMERGENCY)
Facility: MEDICAL CENTER | Age: 59
End: 2019-04-20
Attending: EMERGENCY MEDICINE
Payer: MEDICARE

## 2019-04-20 VITALS
HEART RATE: 95 BPM | DIASTOLIC BLOOD PRESSURE: 75 MMHG | OXYGEN SATURATION: 95 % | BODY MASS INDEX: 43.7 KG/M2 | SYSTOLIC BLOOD PRESSURE: 156 MMHG | RESPIRATION RATE: 18 BRPM | TEMPERATURE: 97.5 F | HEIGHT: 67 IN | WEIGHT: 278.44 LBS

## 2019-04-20 DIAGNOSIS — M75.32 CALCIFIC TENDINITIS OF LEFT SHOULDER: ICD-10-CM

## 2019-04-20 DIAGNOSIS — M19.012 ARTHRITIS OF LEFT ACROMIOCLAVICULAR JOINT: ICD-10-CM

## 2019-04-20 LAB — EKG IMPRESSION: NORMAL

## 2019-04-20 PROCEDURE — 73030 X-RAY EXAM OF SHOULDER: CPT | Mod: LT

## 2019-04-20 PROCEDURE — 93005 ELECTROCARDIOGRAM TRACING: CPT

## 2019-04-20 PROCEDURE — A9270 NON-COVERED ITEM OR SERVICE: HCPCS | Performed by: EMERGENCY MEDICINE

## 2019-04-20 PROCEDURE — 93005 ELECTROCARDIOGRAM TRACING: CPT | Performed by: EMERGENCY MEDICINE

## 2019-04-20 PROCEDURE — 99284 EMERGENCY DEPT VISIT MOD MDM: CPT

## 2019-04-20 PROCEDURE — 700102 HCHG RX REV CODE 250 W/ 637 OVERRIDE(OP): Performed by: EMERGENCY MEDICINE

## 2019-04-20 RX ORDER — HYDROCODONE BITARTRATE AND ACETAMINOPHEN 5; 325 MG/1; MG/1
1 TABLET ORAL ONCE
Status: COMPLETED | OUTPATIENT
Start: 2019-04-20 | End: 2019-04-20

## 2019-04-20 RX ORDER — ACETAMINOPHEN 325 MG/1
650 TABLET ORAL ONCE
Status: COMPLETED | OUTPATIENT
Start: 2019-04-20 | End: 2019-04-20

## 2019-04-20 RX ORDER — HYDROCODONE BITARTRATE AND ACETAMINOPHEN 5; 325 MG/1; MG/1
1-2 TABLET ORAL EVERY 6 HOURS PRN
Qty: 20 TAB | Refills: 0 | Status: SHIPPED | OUTPATIENT
Start: 2019-04-20 | End: 2019-04-23

## 2019-04-20 RX ORDER — CARVEDILOL 25 MG/1
25 TABLET ORAL 2 TIMES DAILY WITH MEALS
COMMUNITY

## 2019-04-20 RX ORDER — WARFARIN SODIUM 10 MG/1
5 TABLET ORAL EVERY EVENING
COMMUNITY

## 2019-04-20 RX ADMIN — HYDROCODONE BITARTRATE AND ACETAMINOPHEN 1 TABLET: 5; 325 TABLET ORAL at 10:46

## 2019-04-20 RX ADMIN — ACETAMINOPHEN 650 MG: 325 TABLET, FILM COATED ORAL at 10:46

## 2019-04-20 NOTE — ED NOTES
The Medication Reconciliation process has been completed by interviewing the patient    Allergies have been reviewed  Antibiotic use in 30 days - none    Home [facility]:  [facility]

## 2019-04-20 NOTE — ED TRIAGE NOTES
"Chief Complaint   Patient presents with   • Shoulder Pain     Left, started on Wednesday, denies any recent injuries or illnesses, states pain is getting progressively worse     Temp (!) 35.8 °C (96.4 °F) (Temporal)   Ht 1.702 m (5' 7\")   Wt (!) 126.3 kg (278 lb 7.1 oz)   BMI 43.61 kg/m²     Pt c/o Left shoulder pain started Wednesday, felt may have slept on arm too long, continues to have increased pain and swelling in Left shoulder.    Pt currently denies c/o CP, abd or back pain.    "

## 2019-04-20 NOTE — ED PROVIDER NOTES
ED Provider Note    CHIEF COMPLAINT  Chief Complaint   Patient presents with   • Shoulder Pain     Left, started on Wednesday, denies any recent injuries or illnesses, states pain is getting progressively worse       HPI  Asha Younger is a 58 y.o. right-hand dominant female who presents with atraumatic left shoulder pain and swelling that started 3 days ago and continues to get worse.  It throbs, she is unable to sleep due to the pain, and the swelling seems to be going down towards her breast.  The pain radiates up toward her neck and down toward her elbow.  She denies any recent trauma or previous shoulder injury or pain.  She denies numbness or tingling.  She has been taking Tylenol without good relief of her pain.  She is unable to lift the arm due to severe pain.    REVIEW OF SYSTEMS  See HPI for further details.  No numbness, neck pain    PAST MEDICAL HISTORY  Past Medical History:   Diagnosis Date   • Hyperlipemia 1/22/2013   • Palpitations 12/21/2011   • Hypercoagulable state ([facility]) 10/11/2011   • DVT (deep vein thrombosis) in pregnancy ([facility]) 10/11/2011   • Diabetes mellitus type 2 in obese ([facility]) 10/11/2011   • HTN (hypertension) 10/11/2011   • GERD (gastroesophageal reflux disease) 10/11/2011   • Edema 10/11/2011   • DVT (deep venous thrombosis) ([facility]) 10/11/2011   • Bronchitis    • Vietnamese measles    • Hypertension    • Nasal drainage    • Obesity    • Sickle cell trait ([facility])        FAMILY HISTORY  Family History   Problem Relation Age of Onset   • Clotting Disorder Mother    • Kidney Disease Mother    • Cancer Mother         lung cancer   • Clotting Disorder Sister         blood clots, protein deficiency   • Cancer Father         Pancreatic  cancer   • Heart Failure Father    • Hypertension Sister        SOCIAL HISTORY  Social History     Social History   • Marital status: Single     Spouse name: N/A   • Number of children: N/A   • Years of education: N/A     Social History Main Topics   • Smoking  status: Current Every Day Smoker     Packs/day: 0.25     Years: 20.00     Types: Cigarettes   • Smokeless tobacco: Never Used      Comment: 5 Cigarettes a day   • Alcohol use No   • Drug use: No   • Sexual activity: Not on file     Other Topics Concern   • Not on file     Social History Narrative   • No narrative on file       SURGICAL HISTORY  Past Surgical History:   Procedure Laterality Date   • OOPHORECTOMY  1984    RIGHT   • LAPAROTOMY     • VENTRAL HERNIA REPAIR         CURRENT MEDICATIONS    Current Facility-Administered Medications:   •  acetaminophen (TYLENOL) tablet 650 mg, 650 mg, Oral, Once, Suzanne Burleson M.D.  •  HYDROcodone-acetaminophen (NORCO) 5-325 MG per tablet 1 Tab, 1 Tab, Oral, Once, Suzanne Burleson M.D.    Current Outpatient Prescriptions:   •  rosuvastatin (CRESTOR) 20 MG Tab, Take 1 Tab by mouth every evening., Disp: 30 Tab, Rfl: 11  •  warfarin (COUMADIN) 5 MG Tab, , Disp: , Rfl:   •  metformin (GLUCOPHAGE) 1000 MG tablet, Take 1,000 mg by mouth 2 times a day., Disp: , Rfl:   •  hydrochlorothiazide (HYDRODIURIL) 25 MG Tab, Take 25 mg by mouth 2 Times a Day., Disp: , Rfl:   •  lisinopril (PRINIVIL, ZESTRIL) 40 MG tablet, Take 40 mg by mouth every day., Disp: , Rfl:   •  glipiZIDE (GLUCOTROL) 10 MG Tab, Take 10 mg by mouth every day., Disp: , Rfl:   •  Coenzyme Q10 (CO Q 10 PO), Take  by mouth every day., Disp: , Rfl:   •  carvedilol (COREG) 25 MG Tab, Take 2 Tabs by mouth 2 times a day, with meals., Disp: 120 Tab, Rfl: 11  •  fluticasone (FLONASE) 50 MCG/ACT nasal spray, Spray 1 Spray in nose every day. Each Nostril, Disp: , Rfl:   •  albuterol (PROAIR HFA) 108 (90 BASE) MCG/ACT AERS, Inhale 2 Puffs by mouth every 6 hours as needed., Disp: , Rfl:   •  warfarin (COUMADIN) 4 MG TABS, Take 4 mg by mouth every day., Disp: , Rfl:   •  hydrocodone-acetaminophen (NORCO) 5-325 MG TABS per tablet, Take 1-2 Tabs by mouth every four hours as needed., Disp: , Rfl:   •  furosemide (LASIX) 40 MG TABS,  "Take 1 Tab by mouth as needed., Disp: 10 Tab, Rfl: 3  •  sitagliptin (JANUVIA) 100 MG TABS, Take 100 mg by mouth every day., Disp: , Rfl:   •  magnesium oxide (MAG-OX) 400 MG TABS, Take 400 mg by mouth 2 times a day., Disp: , Rfl:       ALLERGIES  Allergies   Allergen Reactions   • Erythromycin        PHYSICAL EXAM  VITAL SIGNS: BP (!) 184/85   Pulse 73   Temp (!) 35.8 °C (96.4 °F) (Temporal)   Resp 16   Ht 1.702 m (5' 7\")   Wt (!) 126.3 kg (278 lb 7.1 oz)   SpO2 93%   BMI 43.61 kg/m²  @GIGI[[id number]::@   Constitutional: Well developed, morbidly obese, No acute respiratory distress, Non-toxic appearance.   HENT: Normocephalic, Atraumatic, Bilateral external ears normal, Oropharynx clear, mucous membranes are moist.  Eyes: Conjunctiva normal, No discharge. No icterus.  Neck: Normal range of motion. Supple.  Skin: Warm, Dry, No erythema, No rash.   Musculoskeletal: Exquisite tenderness to the left distal clavicle, AC joint, acromion, biceps tendon.  No tenderness over the left pectoralis.  No tenderness to the left elbow or wrist.  She is able to perform isometric forward flexion, extension, abduction, abduction but has pain with isometric external rotation and abduction and forward flexion.  Forearm supination pronation is intact.  Neurologic: Alert & oriented x 3. No focal deficits noted.  Sensation is intact left hand    DX-SHOULDER 2+ LEFT   Final Result      1.  Calcification at the rotator cuff insertion. Findings are consistent with calcific tendinitis.      2.  Mild to moderate degenerative change in the left acromioclavicular joint.            COURSE & MEDICAL DECISION MAKING  Pertinent Labs & Imaging studies reviewed. (See chart for details)  Patient is given Tylenol and Norco.  Likely rotator cuff or biceps tendon inflammation.  Upon reevaluation, patient is feeling better after the medication.  She is given a sling and follow-up with orthopedist.   The patient will return for new or worsening symptoms " and is stable at the time of discharge.    DISPOSITION:  Patient will be discharged home in stable condition.    FOLLOW UP:  Hugo Iverson M.D.  [address] [place] [address]  [phone]    Schedule an appointment as soon as possible for a visit         OUTPATIENT MEDICATIONS:  Discharge Medication List as of 4/20/2019 11:10 AM      START taking these medications    Details   HYDROcodone-acetaminophen (NORCO) 5-325 MG Tab per tablet Take 1-2 Tabs by mouth every 6 hours as needed for up to 3 days., Disp-20 Tab, R-0, Print Rx Paper, For 3 days            FINAL IMPRESSION  1. Calcific tendinitis of left shoulder    2. Arthritis of left acromioclavicular joint               Electronically signed by: Suzanne Burleson, 4/20/2019 10:20 AM

## 2019-04-20 NOTE — ED NOTES
Pt medicated as directed by ER md,  All results back, chart up for MD for re evaluation. poc update given to pt. No further questions at this time. Further orders and dispo pending

## 2019-05-14 ENCOUNTER — APPOINTMENT (OUTPATIENT)
Dept: SLEEP MEDICINE | Facility: MEDICAL CENTER | Age: 59
End: 2019-05-14
Payer: MEDICARE

## 2019-07-10 DIAGNOSIS — E78.5 HYPERLIPIDEMIA, UNSPECIFIED HYPERLIPIDEMIA TYPE: Primary | ICD-10-CM

## 2019-07-10 RX ORDER — ROSUVASTATIN CALCIUM 20 MG/1
20 TABLET, COATED ORAL EVERY EVENING
Qty: 90 TAB | Refills: 0 | Status: SHIPPED | OUTPATIENT
Start: 2019-07-10 | End: 2019-10-30 | Stop reason: SDUPTHER

## 2019-07-16 ENCOUNTER — SLEEP CENTER VISIT (OUTPATIENT)
Dept: SLEEP MEDICINE | Facility: MEDICAL CENTER | Age: 59
End: 2019-07-16
Payer: MEDICARE

## 2019-07-16 VITALS
OXYGEN SATURATION: 91 % | BODY MASS INDEX: 43.32 KG/M2 | RESPIRATION RATE: 16 BRPM | HEIGHT: 67 IN | WEIGHT: 276 LBS | HEART RATE: 83 BPM | DIASTOLIC BLOOD PRESSURE: 80 MMHG | SYSTOLIC BLOOD PRESSURE: 140 MMHG

## 2019-07-16 DIAGNOSIS — D68.59 HYPERCOAGULABLE STATE (HCC): ICD-10-CM

## 2019-07-16 DIAGNOSIS — Z86.718 HISTORY OF RECURRENT DEEP VEIN THROMBOSIS (DVT): ICD-10-CM

## 2019-07-16 DIAGNOSIS — F17.200 CURRENT SMOKER: ICD-10-CM

## 2019-07-16 DIAGNOSIS — I10 ESSENTIAL HYPERTENSION: ICD-10-CM

## 2019-07-16 DIAGNOSIS — G47.33 OSA (OBSTRUCTIVE SLEEP APNEA): Chronic | ICD-10-CM

## 2019-07-16 DIAGNOSIS — E66.01 MORBID OBESITY WITH BMI OF 40.0-44.9, ADULT (HCC): ICD-10-CM

## 2019-07-16 DIAGNOSIS — R60.0 LOCALIZED EDEMA: Chronic | ICD-10-CM

## 2019-07-16 PROCEDURE — 99214 OFFICE O/P EST MOD 30 MIN: CPT | Performed by: NURSE PRACTITIONER

## 2019-07-16 NOTE — PROGRESS NOTES
Chief Complaint   Patient presents with   • Apnea      Auto BIPAPEPAP min 12 cm EPAP max 20 cm PS 4 cm         HPI:  Asha Younger is a 58 y.o. year old female here today for follow-up on MISAEL.  Patient lives in [place], [place]. [facility].    Last office visit 9/20/2018.    PSG split-night 2/10/2018 indicated an overall AHI of 15.1 with a minimum saturation of 75%.  PLMS index of 0.4.  Patient was titrated on CPAP and BiPAP and had improved response to BiPAP.  She was initiated on auto BiPAP and is currently using max IPAP 17, min EPAP 12, pressure support max/min 4/2, by flex 3.  It was noted during her initial study oxygen levels remain low on even BiPAP therapy.    At last office visit she was having consistent usage but since then she has had multiple sinus issues with recent infection and sinus headaches.  She notes these issues to have resolved but does not use her device since April.  We reviewed the importance of consistent usage.    Patient has a history of DVT and on chronic anticoagulation.  She is a current smoker of about 4 cigarettes daily.  She denies shortness of breath.  She notes chronic cough with phlegm, postnasal drip and possible allergies.  She denies acid reflux.  She notes her sleep schedule to fluctuate which may also add to her fatigue.  We reviewed sleep hygiene in depth.  She denies chest tightness, chest pain or wheezing.  BMI 43.  She is currently using Flonase as needed for postnasal drip.    ROS: As per HPI and otherwise negative if not stated.    Past Medical History:   Diagnosis Date   • Bronchitis    • Diabetes mellitus type 2 in obese ([facility]) 10/11/2011   • DVT (deep vein thrombosis) in pregnancy ([facility]) 10/11/2011   • DVT (deep venous thrombosis) ([facility]) 10/11/2011   • Edema 10/11/2011   • GERD (gastroesophageal reflux disease) 10/11/2011   • [place] measles    • HTN (hypertension) 10/11/2011   • Hypercoagulable state ([facility]) 10/11/2011   • Hyperlipemia 1/22/2013   •  "Hypertension    • Nasal drainage    • Obesity    • Palpitations 12/21/2011   • Sickle cell trait (HCC)        Past Surgical History:   Procedure Laterality Date   • OOPHORECTOMY  1984    RIGHT   • LAPAROTOMY     • VENTRAL HERNIA REPAIR         Family History   Problem Relation Age of Onset   • Clotting Disorder Mother    • Kidney Disease Mother    • Cancer Mother         lung cancer   • Clotting Disorder Sister         blood clots, protein deficiency   • Cancer Father         Pancreatic  cancer   • Heart Failure Father    • Hypertension Sister        Social History     Social History   • Marital status: Single     Spouse name: N/A   • Number of children: N/A   • Years of education: N/A     Occupational History   • Not on file.     Social History Main Topics   • Smoking status: Current Every Day Smoker     Packs/day: 0.25     Years: 20.00     Types: Cigarettes   • Smokeless tobacco: Never Used      Comment: 5 Cigarettes a day   • Alcohol use No   • Drug use: No   • Sexual activity: Not on file     Other Topics Concern   • Not on file     Social History Narrative   • No narrative on file       Allergies as of 07/16/2019 - Reviewed 07/16/2019   Allergen Reaction Noted   • Erythromycin Itching and Nausea 10/11/2011        @Vital signs for this encounter:  Vitals:    07/16/19 0959   Height: 1.702 m (5' 7\")   Weight: (!) 125.2 kg (276 lb)   Weight % change since last entry.: 0 %   BP: 140/80   Pulse: 83   BMI (Calculated): 43.23   Resp: 16   O2 sat % room air: (!) 91 %       Current medications as of today   Current Outpatient Prescriptions   Medication Sig Dispense Refill   • rosuvastatin (CRESTOR) 20 MG Tab Take 1 Tab by mouth every evening. 90 Tab 0   • carvedilol (COREG) 25 MG Tab Take 25 mg by mouth 2 times a day, with meals.     • warfarin (COUMADIN) 10 MG Tab Take 7-10 mg by mouth every evening. 10 mg on Mondays and Fridays   7 mg on all other days  MD monitors her warfarin     • metformin (GLUCOPHAGE) 1000 MG " tablet Take 1,000 mg by mouth 2 times a day.     • hydrochlorothiazide (HYDRODIURIL) 25 MG Tab Take 25 mg by mouth 2 Times a Day.     • lisinopril (PRINIVIL, ZESTRIL) 40 MG tablet Take 40 mg by mouth every day.     • glipiZIDE (GLUCOTROL) 10 MG Tab Take 10 mg by mouth every day.     • Coenzyme Q10 (CO Q 10 PO) Take 1 Dose by mouth every day.     • fluticasone (FLONASE) 50 MCG/ACT nasal spray Spray 1 Spray in nose 1 time daily as needed. Each Nostril      • furosemide (LASIX) 40 MG TABS Take 1 Tab by mouth as needed. 10 Tab 3   • sitagliptin (JANUVIA) 100 MG TABS Take 100 mg by mouth every day.     • magnesium oxide (MAG-OX) 400 MG TABS Take 400 mg by mouth 2 times a day.       No current facility-administered medications for this visit.          Physical Exam:   Gen:           Alert and oriented, No apparent distress. Mood and affect appropriate, normal interaction with examiner.  Eyes:          PERRL, EOM intact, sclere white, conjunctive moist.  Ears:          Not examined.   Hearing:     Grossly intact.  Nose:          Normal, no lesions or deformities.  Dentition:    Good dentition.  Oropharynx:   Tongue normal.  Mallampati Classification: not examined.  Neck:        Supple, trachea midline, no masses.  Respiratory Effort: No intercostal retractions or use of accessory muscles.   Lung Auscultation:      Clear to auscultation bilaterally; no rales, rhonchi or wheezing.  CV:            Regular rate and rhythm. No murmurs, rubs or gallops.  Abd:           Not examined.   Lymphadenopathy: Not examined.  Gait and Station: Normal.  Digits and Nails: No clubbing, cyanosis, petechiae, or nodes.   Cranial Nerves: II-XII grossly intact.  Skin:        No rashes, lesions or ulcers noted.               Ext:           BLE edema, 2+. L>R.      Assessment:  1. MISAEL (obstructive sleep apnea)  DME CNOX By D[facility]    CANCELED: Overnight Oximetry   2. History of recurrent deep vein thrombosis (DVT)     3. Essential hypertension      4. Hypercoagulable state (HCC)     5. Morbid obesity with BMI of 40.0-44.9, adult ([facility])  Height And Weight   6. Current smoker     7. Localized edema PEDAL EDEMA         Immunizations:    Flu:declines  Pneumovax 23:not due  Prevnar 13:not due    Plan:  1.  Patient will continue to benefit from therapy but due to upper respiratory and sinus issues she stopped.  She will reinitiate therapy with her current settings.  She complains of mask issues.  Will perform mask fit.   DME mask/supplies. Airfit F30 medium  DME CNOX on Pap and 2.5 months.  2.  Discussed sleep hygiene in depth.  3.  Patient will add sinus rinse twice daily followed by Flonase for postnasal drip.  4.  Encouraged weight loss through dietary changes and walking.  5.  Follow-up in 3 months with compliance card/CNOX results, sooner if needed.    Please note that this dictation was created using voice recognition software. I have made every reasonable attempt to correct obvious errors, but it is possible there are errors of grammar and possibly content that I did not discover before finalizing the note.

## 2019-07-16 NOTE — LETTER
MOIZ Cheema  [facility]   [address] [place] [address]  Phone: [phone] - Fax: [phone]           Encounter Date: 7/16/2019  Provider: MOIZ Cheema  Location of Care: [facility]      Patient:   Asha Younger   MR Number: [id number]   YOB: 1960     PROGRESS NOTE:  Chief Complaint   Patient presents with   • Apnea      Auto BIPAPEPAP min 12 cm EPAP max 20 cm PS 4 cm         HPI:  Asha Younger is a 58 y.o. year old female here today for follow-up on MISAEL.  Patient lives in [facility].    Last office visit 9/20/2018.    PSG split-night 2/10/2018 indicated an overall AHI of 15.1 with a minimum saturation of 75%.  PLMS index of 0.4.  Patient was titrated on CPAP and BiPAP and had improved response to BiPAP.  She was initiated on auto BiPAP and is currently using max IPAP 17, min EPAP 12, pressure support max/min 4/2, by flex 3.  It was noted during her initial study oxygen levels remain low on even BiPAP therapy.    At last office visit she was having consistent usage but since then she has had multiple sinus issues with recent infection and sinus headaches.  She notes these issues to have resolved but does not use her device since April.  We reviewed the importance of consistent usage.    Patient has a history of DVT and on chronic anticoagulation.  She is a current smoker of about 4 cigarettes daily.  She denies shortness of breath.  She notes chronic cough with phlegm, postnasal drip and possible allergies.  She denies acid reflux.  She notes her sleep schedule to fluctuate which may also add to her fatigue.  We reviewed sleep hygiene in depth.  She denies chest tightness, chest pain or wheezing.  BMI 43.  She is currently using Flonase as needed for postnasal drip.    ROS: As per HPI and otherwise negative if not stated.    Past Medical History:    "  Diagnosis Date   • Bronchitis    • Diabetes mellitus type 2 in obese ([facility]) 10/11/2011   • DVT (deep vein thrombosis) in pregnancy ([facility]) 10/11/2011   • DVT (deep venous thrombosis) ([facility]) 10/11/2011   • Edema 10/11/2011   • GERD (gastroesophageal reflux disease) 10/11/2011   • [place]ut[place] measles    • HTN (hypertension) 10/11/2011   • Hypercoagulable state ([facility]) 10/11/2011   • Hyperlipemia 1/22/2013   • Hypertension    • Nasal drainage    • Obesity    • Palpitations 12/21/2011   • Sickle cell trait ([facility])        Past Surgical History:   Procedure Laterality Date   • OOPHORECTOMY  1984    RIGHT   • LAPAROTOMY     • VENTRAL HERNIA REPAIR         Family History   Problem Relation Age of Onset   • Clotting Disorder Mother    • Kidney Disease Mother    • Cancer Mother         lung cancer   • Clotting Disorder Sister         blood clots, protein deficiency   • Cancer Father         Pancreatic  cancer   • Heart Failure Father    • Hypertension Sister        Social History     Social History   • Marital status: Single     Spouse name: N/A   • Number of children: N/A   • Years of education: N/A     Occupational History   • Not on file.     Social History Main Topics   • Smoking status: Current Every Day Smoker     Packs/day: 0.25     Years: 20.00     Types: Cigarettes   • Smokeless tobacco: Never Used      Comment: 5 Cigarettes a day   • Alcohol use No   • Drug use: No   • Sexual activity: Not on file     Other Topics Concern   • Not on file     Social History Narrative   • No narrative on file       Allergies as of 07/16/2019 - Reviewed 07/16/2019   Allergen Reaction Noted   • Erythromycin Itching and Nausea 10/11/2011        @Vital signs for this encounter:  Vitals:    07/16/19 0959   Height: 1.702 m (5' 7\")   Weight: (!) 125.2 kg (276 lb)   Weight % change since last entry.: 0 %   BP: 140/80   Pulse: 83   BMI (Calculated): 43.23   Resp: 16   O2 sat % room air: (!) 91 %       Current medications as of today   Current Outpatient " Prescriptions   Medication Sig Dispense Refill   • rosuvastatin (CRESTOR) 20 MG Tab Take 1 Tab by mouth every evening. 90 Tab 0   • carvedilol (COREG) 25 MG Tab Take 25 mg by mouth 2 times a day, with meals.     • warfarin (COUMADIN) 10 MG Tab Take 7-10 mg by mouth every evening. 10 mg on Mondays and Fridays   7 mg on all other days  MD monitors her warfarin     • metformin (GLUCOPHAGE) 1000 MG tablet Take 1,000 mg by mouth 2 times a day.     • hydrochlorothiazide (HYDRODIURIL) 25 MG Tab Take 25 mg by mouth 2 Times a Day.     • lisinopril (PRINIVIL, ZESTRIL) 40 MG tablet Take 40 mg by mouth every day.     • glipiZIDE (GLUCOTROL) 10 MG Tab Take 10 mg by mouth every day.     • Coenzyme Q10 (CO Q 10 PO) Take 1 Dose by mouth every day.     • fluticasone (FLONASE) 50 MCG/ACT nasal spray Spray 1 Spray in nose 1 time daily as needed. Each Nostril      • furosemide (LASIX) 40 MG TABS Take 1 Tab by mouth as needed. 10 Tab 3   • sitagliptin (JANUVIA) 100 MG TABS Take 100 mg by mouth every day.     • magnesium oxide (MAG-OX) 400 MG TABS Take 400 mg by mouth 2 times a day.       No current facility-administered medications for this visit.          Physical Exam:   Gen:           Alert and oriented, No apparent distress. Mood and affect appropriate, normal interaction with examiner.  Eyes:          PERRL, EOM intact, sclere white, conjunctive moist.  Ears:          Not examined.   Hearing:     Grossly intact.  Nose:          Normal, no lesions or deformities.  Dentition:    Good dentition.  Oropharynx:   Tongue normal.  Mallampati Classification: not examined.  Neck:        Supple, trachea midline, no masses.  Respiratory Effort: No intercostal retractions or use of accessory muscles.   Lung Auscultation:      Clear to auscultation bilaterally; no rales, rhonchi or wheezing.  CV:            Regular rate and rhythm. No murmurs, rubs or gallops.  Abd:           Not examined.   Lymphadenopathy: Not examined.  Gait and  Station: Normal.  Digits and Nails: No clubbing, cyanosis, petechiae, or nodes.   Cranial Nerves: II-XII grossly intact.  Skin:        No rashes, lesions or ulcers noted.               Ext:           BLE edema, 2+. L>R.      Assessment:  1. MISAEL (obstructive sleep apnea)  DME CNOX By [facility]    CANCELED: Overnight Oximetry   2. History of recurrent deep vein thrombosis (DVT)     3. Essential hypertension     4. Hypercoagulable state (HCC)     5. Morbid obesity with BMI of 40.0-44.9, adult ([facility])  Height And Weight   6. Current smoker     7. Localized edema PEDAL EDEMA         Immunizations:    Flu:declines  Pneumovax 23:not due  Prevnar 13:not due    Plan:  1.  Patient will continue to benefit from therapy but due to upper respiratory and sinus issues she stopped.  She will reinitiate therapy with her current settings.  She complains of mask issues.  Will perform mask fit.   DME mask/supplies. Airfit F30 medium  DME CNOX on Pap and 2.5 months.  2.  Discussed sleep hygiene in depth.  3.  Patient will add sinus rinse twice daily followed by Flonase for postnasal drip.  4.  Encouraged weight loss through dietary changes and walking.  5.  Follow-up in 3 months with compliance card/CNOX results, sooner if needed.    Please note that this dictation was created using voice recognition software. I have made every reasonable attempt to correct obvious errors, but it is possible there are errors of grammar and possibly content that I did not discover before finalizing the note.      Electronically signed by Shea Pleitez A.P.R.NArianna  on 07/16/19    BELÉN Zuniga  [address] [place] [address]  VIA Facsimile: [phone]

## 2019-10-01 ENCOUNTER — OFFICE VISIT (OUTPATIENT)
Dept: CARDIOLOGY | Facility: MEDICAL CENTER | Age: 59
End: 2019-10-01
Payer: MEDICARE

## 2019-10-01 VITALS
SYSTOLIC BLOOD PRESSURE: 144 MMHG | BODY MASS INDEX: 44.1 KG/M2 | HEIGHT: 67 IN | WEIGHT: 281 LBS | OXYGEN SATURATION: 95 % | DIASTOLIC BLOOD PRESSURE: 78 MMHG | HEART RATE: 74 BPM

## 2019-10-01 DIAGNOSIS — D68.59 HYPERCOAGULABLE STATE (HCC): ICD-10-CM

## 2019-10-01 DIAGNOSIS — I10 ESSENTIAL HYPERTENSION: ICD-10-CM

## 2019-10-01 DIAGNOSIS — E11.9 DIABETES MELLITUS TYPE II, NON INSULIN DEPENDENT (HCC): ICD-10-CM

## 2019-10-01 DIAGNOSIS — E78.5 DYSLIPIDEMIA: ICD-10-CM

## 2019-10-01 PROCEDURE — 99214 OFFICE O/P EST MOD 30 MIN: CPT | Performed by: INTERNAL MEDICINE

## 2019-10-01 RX ORDER — TOBRAMYCIN 3 MG/ML
SOLUTION/ DROPS OPHTHALMIC
COMMUNITY
Start: 2019-08-14 | End: 2022-10-14

## 2019-10-01 RX ORDER — WARFARIN SODIUM 5 MG/1
TABLET ORAL
COMMUNITY
Start: 2019-08-12 | End: 2023-02-15

## 2019-10-01 NOTE — PROGRESS NOTES
Chief Complaint   Patient presents with   • Hypertension     F/v: 6 MO       Subjective:   Asha Younger is a 58 y.o. female who presents today for followup for hypertension and edema. She also has hyperlipidemia and a history of a hypercoagulable state. She is on chronic anticoagulation therapy.    Her blood pressure has been running about 140/70.    She has had no anginal type chest discomfort.  She denies any dyspnea on exertion on a routine walk.  She has had no PND orthopnea.  She does have difficulty with chronic edema because of recurrent DVT.  She denies any recurrent palpitations or lightheadedness.    Past Medical History:   Diagnosis Date   • Bronchitis    • Diabetes mellitus type 2 in obese ([facility]) 10/11/2011   • DVT (deep vein thrombosis) in pregnancy 10/11/2011   • DVT (deep venous thrombosis) ([facility]) 10/11/2011   • Edema 10/11/2011   • GERD (gastroesophageal reflux disease) 10/11/2011   • [place] measles    • HTN (hypertension) 10/11/2011   • Hypercoagulable state ([facility]) 10/11/2011   • Hyperlipemia 1/22/2013   • Hypertension    • Nasal drainage    • Obesity    • Palpitations 12/21/2011   • Sickle cell trait ([facility])      Past Surgical History:   Procedure Laterality Date   • OOPHORECTOMY  1984    RIGHT   • LAPAROTOMY     • VENTRAL HERNIA REPAIR       Family History   Problem Relation Age of Onset   • Clotting Disorder Mother    • Kidney Disease Mother    • Cancer Mother         lung cancer   • Clotting Disorder Sister         blood clots, protein deficiency   • Cancer Father         Pancreatic  cancer   • Heart Failure Father    • Hypertension Sister      Social History     Socioeconomic History   • Marital status: Single     Spouse name: Not on file   • Number of children: Not on file   • Years of education: Not on file   • Highest education level: Not on file   Occupational History   • Not on file   Social Needs   • Financial resource strain: Not on file   • Food insecurity:     Worry: Not on file      Inability: Not on file   • Transportation needs:     Medical: Not on file     Non-medical: Not on file   Tobacco Use   • Smoking status: Current Every Day Smoker     Packs/day: 0.25     Years: 20.00     Pack years: 5.00     Types: Cigarettes   • Smokeless tobacco: Never Used   • Tobacco comment: 5 Cigarettes a day   Substance and Sexual Activity   • Alcohol use: No   • Drug use: No   • Sexual activity: Not on file   Lifestyle   • Physical activity:     Days per week: Not on file     Minutes per session: Not on file   • Stress: Not on file   Relationships   • Social connections:     Talks on phone: Not on file     Gets together: Not on file     Attends Taoism service: Not on file     Active member of club or organization: Not on file     Attends meetings of clubs or organizations: Not on file     Relationship status: Not on file   • Intimate partner violence:     Fear of current or ex partner: Not on file     Emotionally abused: Not on file     Physically abused: Not on file     Forced sexual activity: Not on file   Other Topics Concern   • Not on file   Social History Narrative   • Not on file     Allergies   Allergen Reactions   • Erythromycin Itching and Nausea     Rxn - many years ago     Outpatient Encounter Medications as of 10/1/2019   Medication Sig Dispense Refill   • rosuvastatin (CRESTOR) 20 MG Tab Take 1 Tab by mouth every evening. 90 Tab 0   • carvedilol (COREG) 25 MG Tab Take 25 mg by mouth 2 times a day, with meals.     • warfarin (COUMADIN) 10 MG Tab Take 7-10 mg by mouth every evening. 10 mg on Mondays and Fridays   7 mg on all other days  MD monitors her warfarin     • metformin (GLUCOPHAGE) 1000 MG tablet Take 1,000 mg by mouth 2 times a day.     • hydrochlorothiazide (HYDRODIURIL) 25 MG Tab Take 25 mg by mouth 2 Times a Day.     • lisinopril (PRINIVIL, ZESTRIL) 40 MG tablet Take 40 mg by mouth every day.     • glipiZIDE (GLUCOTROL) 10 MG Tab Take 10 mg by mouth every day.     • Coenzyme Q10 (CO  "Q 10 PO) Take 1 Dose by mouth every day.     • fluticasone (FLONASE) 50 MCG/ACT nasal spray Spray 1 Spray in nose 1 time daily as needed. Each Nostril      • furosemide (LASIX) 40 MG TABS Take 1 Tab by mouth as needed. 10 Tab 3   • sitagliptin (JANUVIA) 100 MG TABS Take 100 mg by mouth every day.     • magnesium oxide (MAG-OX) 400 MG TABS Take 400 mg by mouth 2 times a day.     • tobramycin (TOBREX) 0.3 % Solution ophthalmic solution      • warfarin (COUMADIN) 5 MG Tab        No facility-administered encounter medications on file as of 10/1/2019.      ROS       Objective:   /78 (BP Location: Left arm, Patient Position: Sitting, BP Cuff Size: Adult)   Pulse 74   Ht 1.702 m (5' 7\")   Wt (!) 127.5 kg (281 lb)   SpO2 95%   BMI 44.01 kg/m²     Physical Exam   Constitutional: She appears well-developed and well-nourished.   Neck: No JVD present.   Cardiovascular: Normal rate and regular rhythm.   No murmur heard.  Pulmonary/Chest: Effort normal and breath sounds normal. She has no rales.   Abdominal: Soft. There is no tenderness.   Musculoskeletal: She exhibits edema (Trace pretibial).     Laboratory from January 16th 2019: BUN 13, creatinine 0.9 and potassium 3.9.    Assessment:     1. Essential hypertension     2. Hypercoagulable state (HCC)     3. Dyslipidemia         Medical Decision Making:  Today's Assessment / Status / Plan:     Ms. Younger is clinically stable.  I would like her to obtain a lipid and CMP sometime in the next couple of weeks.  We will give her a new lab slip.  Given her diabetes and multiple cardiac risk factors I feel she may be a good candidate for a SGLT-2 inhibitor.  We will refer her to pharmacotherapy clinic.  She will otherwise follow-up in about 3 months.  Her blood pressure is mildly elevated but I suspect this will come down if she is she is started on a SGLT-2 inhibitor.  "

## 2019-10-16 ENCOUNTER — TELEPHONE (OUTPATIENT)
Dept: VASCULAR LAB | Facility: MEDICAL CENTER | Age: 59
End: 2019-10-16

## 2019-10-16 NOTE — TELEPHONE ENCOUNTER
Left voicemail message for patient to return call to [facility] to establish care KIKO Lakhani, Clinical Pharmacist, CDE, CACP

## 2019-10-22 ENCOUNTER — APPOINTMENT (OUTPATIENT)
Dept: SLEEP MEDICINE | Facility: MEDICAL CENTER | Age: 59
End: 2019-10-22
Payer: MEDICARE

## 2019-10-30 DIAGNOSIS — E78.5 HYPERLIPIDEMIA, UNSPECIFIED HYPERLIPIDEMIA TYPE: ICD-10-CM

## 2019-11-01 LAB
ALBUMIN SERPL-MCNC: 4.4 G/DL (ref 3.5–5.5)
ALBUMIN/GLOB SERPL: 1.6 {RATIO} (ref 1.2–2.2)
ALP SERPL-CCNC: 65 IU/L (ref 39–117)
ALT SERPL-CCNC: 19 IU/L (ref 0–32)
AST SERPL-CCNC: 16 IU/L (ref 0–40)
BILIRUB SERPL-MCNC: 0.3 MG/DL (ref 0–1.2)
BUN SERPL-MCNC: 9 MG/DL (ref 6–24)
BUN/CREAT SERPL: 9 (ref 9–23)
CALCIUM SERPL-MCNC: 9.3 MG/DL (ref 8.7–10.2)
CHLORIDE SERPL-SCNC: 100 MMOL/L (ref 96–106)
CHOLEST SERPL-MCNC: 97 MG/DL (ref 100–199)
CO2 SERPL-SCNC: 27 MMOL/L (ref 20–29)
CREAT SERPL-MCNC: 1.01 MG/DL (ref 0.57–1)
GLOBULIN SER CALC-MCNC: 2.8 G/DL (ref 1.5–4.5)
GLUCOSE SERPL-MCNC: 137 MG/DL (ref 65–99)
HDLC SERPL-MCNC: 41 MG/DL
LABORATORY COMMENT REPORT: ABNORMAL
LDLC SERPL CALC-MCNC: 39 MG/DL (ref 0–99)
POTASSIUM SERPL-SCNC: 4 MMOL/L (ref 3.5–5.2)
PROT SERPL-MCNC: 7.2 G/DL (ref 6–8.5)
SODIUM SERPL-SCNC: 142 MMOL/L (ref 134–144)
TRIGL SERPL-MCNC: 84 MG/DL (ref 0–149)
VLDLC SERPL CALC-MCNC: 17 MG/DL (ref 5–40)

## 2019-11-01 RX ORDER — ROSUVASTATIN CALCIUM 20 MG/1
20 TABLET, COATED ORAL EVERY EVENING
Qty: 90 TAB | Refills: 3 | Status: SHIPPED | OUTPATIENT
Start: 2019-11-01

## 2019-11-06 ENCOUNTER — TELEPHONE (OUTPATIENT)
Dept: VASCULAR LAB | Facility: MEDICAL CENTER | Age: 59
End: 2019-11-06

## 2019-11-06 NOTE — TELEPHONE ENCOUNTER
Called and left msg for pt to call back to establish care regarding pharmacotherapy referral for DM2 (starting SGLT2) from Dr. Gill on 10/1/19    Insurance: medicare  PCP: lita - BELÉN Zuniga  Locations to be seen: [facility] at [phone], fax [phone]    Sol Mcneill, MaicolD

## 2019-11-22 ENCOUNTER — TELEPHONE (OUTPATIENT)
Dept: VASCULAR LAB | Facility: MEDICAL CENTER | Age: 59
End: 2019-11-22

## 2019-11-22 NOTE — LETTER
November 22, 2019    Asha Younger  [address]  [place] [facility] [address]      Dear Asha Younger ,    We have been unsuccessful in our attempts to contact you regarding your [facility] referral from Dr. Gill to discuss a potential medication change.  Please contact us if you would like to schedule an appointment for help managing your blood sugars.     Please contact our clinic so we may assist you.  We are open Monday-Friday 8 am until 5 pm.  You may reach our Service at [phone].        Sincerely,         Alfredo Ewing, PharmD, [facility]  Pharmacy Clinical Supervisor  [facility]  Outpatient Ambulatory Care Service

## 2019-11-22 NOTE — TELEPHONE ENCOUNTER
Called and left msg for pt to call back to establish care regarding pharmacotherapy referral for DM2 (starting SGLT2) from Dr. Gill on 10/1/19     Unable to establish care, will send letter and FYI to referring provider.     Insurance: medicare  PCP: [facility] - BELÉN Zuniga  Locations to be seen: [facility] at [phone], fax [phone]     Sol Mcneill, PharmD

## 2019-12-30 ENCOUNTER — OFFICE VISIT (OUTPATIENT)
Dept: CARDIOLOGY | Facility: MEDICAL CENTER | Age: 59
End: 2019-12-30
Payer: MEDICARE

## 2019-12-30 VITALS
SYSTOLIC BLOOD PRESSURE: 160 MMHG | HEIGHT: 67 IN | BODY MASS INDEX: 44.89 KG/M2 | HEART RATE: 82 BPM | DIASTOLIC BLOOD PRESSURE: 82 MMHG | OXYGEN SATURATION: 94 % | WEIGHT: 286 LBS

## 2019-12-30 DIAGNOSIS — R00.2 PALPITATIONS: ICD-10-CM

## 2019-12-30 DIAGNOSIS — E78.5 DYSLIPIDEMIA: ICD-10-CM

## 2019-12-30 DIAGNOSIS — I10 ESSENTIAL HYPERTENSION: ICD-10-CM

## 2019-12-30 PROCEDURE — 99214 OFFICE O/P EST MOD 30 MIN: CPT | Performed by: INTERNAL MEDICINE

## 2019-12-30 NOTE — PROGRESS NOTES
Chief Complaint   Patient presents with   • HTN (Controlled)       Subjective:   Asha Younger is a 59 y.o. female who presents today for followup for hypertension and edema. She also has hyperlipidemia and a history of a hypercoagulable state. She is on chronic anticoagulation therapy.    Her blood pressure has been running about 140/70.    She is been having significant difficulty with back pain at her blood pressures have been up recently.  She denies any chest discomfort or dyspnea but has noted some edema.  She denies any palpitations or lightheadedness.    Past Medical History:   Diagnosis Date   • Bronchitis    • Diabetes mellitus type 2 in obese ([facility]) 10/11/2011   • DVT (deep vein thrombosis) in pregnancy 10/11/2011   • DVT (deep venous thrombosis) ([facility]) 10/11/2011   • Edema 10/11/2011   • GERD (gastroesophageal reflux disease) 10/11/2011   • [place] measles    • HTN (hypertension) 10/11/2011   • Hypercoagulable state ([facility]) 10/11/2011   • Hyperlipemia 1/22/2013   • Hypertension    • Nasal drainage    • Obesity    • Palpitations 12/21/2011   • Sickle cell trait ([facility])      Past Surgical History:   Procedure Laterality Date   • OOPHORECTOMY  1984    RIGHT   • LAPAROTOMY     • VENTRAL HERNIA REPAIR       Family History   Problem Relation Age of Onset   • Clotting Disorder Mother    • Kidney Disease Mother    • Cancer Mother         lung cancer   • Clotting Disorder Sister         blood clots, protein deficiency   • Cancer Father         Pancreatic  cancer   • Heart Failure Father    • Hypertension Sister      Social History     Socioeconomic History   • Marital status: Single     Spouse name: Not on file   • Number of children: Not on file   • Years of education: Not on file   • Highest education level: Not on file   Occupational History   • Not on file   Social Needs   • Financial resource strain: Not on file   • Food insecurity:     Worry: Not on file     Inability: Not on file   • Transportation needs:      Medical: Not on file     Non-medical: Not on file   Tobacco Use   • Smoking status: Current Every Day Smoker     Packs/day: 0.25     Years: 20.00     Pack years: 5.00     Types: Cigarettes   • Smokeless tobacco: Never Used   • Tobacco comment: 5 Cigarettes a day   Substance and Sexual Activity   • Alcohol use: No   • Drug use: No   • Sexual activity: Not on file   Lifestyle   • Physical activity:     Days per week: Not on file     Minutes per session: Not on file   • Stress: Not on file   Relationships   • Social connections:     Talks on phone: Not on file     Gets together: Not on file     Attends Buddhism service: Not on file     Active member of club or organization: Not on file     Attends meetings of clubs or organizations: Not on file     Relationship status: Not on file   • Intimate partner violence:     Fear of current or ex partner: Not on file     Emotionally abused: Not on file     Physically abused: Not on file     Forced sexual activity: Not on file   Other Topics Concern   • Not on file   Social History Narrative   • Not on file     Allergies   Allergen Reactions   • Erythromycin Itching and Nausea     Rxn - many years ago     Outpatient Encounter Medications as of 12/30/2019   Medication Sig Dispense Refill   • rosuvastatin (CRESTOR) 20 MG Tab Take 1 Tab by mouth every evening. 90 Tab 3   • tobramycin (TOBREX) 0.3 % Solution ophthalmic solution      • warfarin (COUMADIN) 5 MG Tab      • carvedilol (COREG) 25 MG Tab Take 25 mg by mouth 2 times a day, with meals.     • warfarin (COUMADIN) 10 MG Tab Take 7-10 mg by mouth every evening. 10 mg on Mondays and Fridays   7 mg on all other days  MD monitors her warfarin     • metformin (GLUCOPHAGE) 1000 MG tablet Take 1,000 mg by mouth 2 times a day.     • hydrochlorothiazide (HYDRODIURIL) 25 MG Tab Take 25 mg by mouth 2 Times a Day.     • lisinopril (PRINIVIL, ZESTRIL) 40 MG tablet Take 40 mg by mouth every day.     • glipiZIDE (GLUCOTROL) 10 MG Tab Take  "10 mg by mouth every day.     • Coenzyme Q10 (CO Q 10 PO) Take 1 Dose by mouth every day.     • fluticasone (FLONASE) 50 MCG/ACT nasal spray Spray 1 Spray in nose 1 time daily as needed. Each Nostril      • furosemide (LASIX) 40 MG TABS Take 1 Tab by mouth as needed. 10 Tab 3   • sitagliptin (JANUVIA) 100 MG TABS Take 100 mg by mouth every day.     • magnesium oxide (MAG-OX) 400 MG TABS Take 400 mg by mouth 2 times a day.       No facility-administered encounter medications on file as of 12/30/2019.      ROS       Objective:   /82 (BP Location: Left arm, Patient Position: Sitting)   Pulse 82   Ht 1.702 m (5' 7\")   Wt (!) 129.7 kg (286 lb)   SpO2 94%   BMI 44.79 kg/m²     Physical Exam   Constitutional: She appears well-developed and well-nourished.   Neck: No JVD present.   Cardiovascular: Normal rate and regular rhythm.   No murmur heard.  Pulmonary/Chest: Effort normal and breath sounds normal. She has no rales.   Abdominal: Soft. There is no tenderness.   Musculoskeletal:         General: Edema (1+ pretibial) present.     Lab Results   Component Value Date/Time    CHOLSTRLTOT 97 (L) 10/31/2019 08:12 AM    CHOLSTRLTOT 143 01/08/2013 12:00 AM    LDL 39 10/31/2019 08:12 AM    LDL 70 01/08/2013 12:00 AM    HDL 41 10/31/2019 08:12 AM    HDL 48 01/08/2013 12:00 AM    TRIGLYCERIDE 84 10/31/2019 08:12 AM    TRIGLYCERIDE 125 01/08/2013 12:00 AM       Lab Results   Component Value Date/Time    SODIUM 142 10/31/2019 08:12 AM    SODIUM 138 04/20/2014 09:02 PM    POTASSIUM 4.0 10/31/2019 08:12 AM    POTASSIUM 3.4 (L) 04/20/2014 09:02 PM    CHLORIDE 100 10/31/2019 08:12 AM    CHLORIDE 101 04/20/2014 09:02 PM    CO2 27 10/31/2019 08:12 AM    CO2 29 04/20/2014 09:02 PM    GLUCOSE 137 (H) 10/31/2019 08:12 AM    GLUCOSE 143 (H) 04/20/2014 09:02 PM    BUN 9 10/31/2019 08:12 AM    BUN 12 04/20/2014 09:02 PM    CREATININE 1.01 (H) 10/31/2019 08:12 AM    CREATININE 1.24 04/20/2014 09:02 PM    CREATININE 1.05 (H) 01/08/2013 " 12:00 AM    BUNCREATRAT 9 10/31/2019 08:12 AM    BUNCREATRAT 12 01/08/2013 12:00 AM     Lab Results   Component Value Date/Time    ALKPHOSPHAT 65 10/31/2019 08:12 AM    ALKPHOSPHAT 85 01/08/2013 12:00 AM    ASTSGOT 16 10/31/2019 08:12 AM    ASTSGOT 19 01/08/2013 12:00 AM    ALTSGPT 19 10/31/2019 08:12 AM    ALTSGPT 26 01/08/2013 12:00 AM    TBILIRUBIN 0.3 10/31/2019 08:12 AM    TBILIRUBIN 0.5 01/08/2013 12:00 AM      No results found for: BNPBTYPENAT       Assessment:     1. Dyslipidemia     2. Palpitations     3. Essential hypertension         Medical Decision Making:  Today's Assessment / Status / Plan:     Ms. Younger is clinically stable.  Is clinically stable.  Her blood pressure is up today but is been under reasonable control at home.  She is having significant difficulty with back pain which is probably contributing to her elevated blood pressures.  We will continue her on her current medications for now.  But, she was asked to call for a sooner follow-up appointment of her blood pressure does not improve with control of her back pain.  Her lipid status appears to be under excellent control.  She will follow-up in about 6 months.

## 2020-01-24 ENCOUNTER — TELEPHONE (OUTPATIENT)
Dept: PULMONOLOGY | Facility: HOSPICE | Age: 60
End: 2020-01-24

## 2020-01-24 NOTE — TELEPHONE ENCOUNTER
Called and left vm for pt as per Josie at [facility] pt has not completed OPO through them. She did apologize as she and everyone else in the office is brand new as of October of last year. I refaxed that order to her and she assured me they would get in touch with the pt. I left a vm to ask the pt if she'd like to reschedule until she is able to complete the opo or she can keep her appt to go over any questions or concerns she may have. Advised she call back and leet me know.

## 2020-12-14 DIAGNOSIS — E78.5 HYPERLIPIDEMIA, UNSPECIFIED HYPERLIPIDEMIA TYPE: ICD-10-CM

## 2020-12-15 RX ORDER — ROSUVASTATIN CALCIUM 20 MG/1
20 TABLET, COATED ORAL EVERY EVENING
Qty: 90 TAB | Refills: 1 | OUTPATIENT
Start: 2020-12-15

## 2022-10-14 ENCOUNTER — OFFICE VISIT (OUTPATIENT)
Dept: CARDIOLOGY | Facility: MEDICAL CENTER | Age: 62
End: 2022-10-14
Payer: MEDICARE

## 2022-10-14 VITALS
HEART RATE: 68 BPM | BODY MASS INDEX: 42.06 KG/M2 | WEIGHT: 268 LBS | HEIGHT: 67 IN | OXYGEN SATURATION: 95 % | DIASTOLIC BLOOD PRESSURE: 84 MMHG | SYSTOLIC BLOOD PRESSURE: 160 MMHG | RESPIRATION RATE: 17 BRPM

## 2022-10-14 DIAGNOSIS — E11.69 DYSLIPIDEMIA ASSOCIATED WITH TYPE 2 DIABETES MELLITUS (HCC): ICD-10-CM

## 2022-10-14 DIAGNOSIS — E11.59 HYPERTENSION ASSOCIATED WITH DIABETES (HCC): ICD-10-CM

## 2022-10-14 DIAGNOSIS — I15.2 HYPERTENSION ASSOCIATED WITH DIABETES (HCC): ICD-10-CM

## 2022-10-14 DIAGNOSIS — E66.01 MORBID OBESITY WITH BMI OF 40.0-44.9, ADULT (HCC): ICD-10-CM

## 2022-10-14 DIAGNOSIS — Z79.899 HIGH RISK MEDICATION USE: ICD-10-CM

## 2022-10-14 DIAGNOSIS — Z86.718 HISTORY OF RECURRENT DEEP VEIN THROMBOSIS (DVT): ICD-10-CM

## 2022-10-14 DIAGNOSIS — D68.59 HYPERCOAGULABLE STATE (HCC): ICD-10-CM

## 2022-10-14 DIAGNOSIS — Z72.0 TOBACCO ABUSE: ICD-10-CM

## 2022-10-14 DIAGNOSIS — G47.33 OSA (OBSTRUCTIVE SLEEP APNEA): Chronic | ICD-10-CM

## 2022-10-14 DIAGNOSIS — E78.5 DYSLIPIDEMIA ASSOCIATED WITH TYPE 2 DIABETES MELLITUS (HCC): ICD-10-CM

## 2022-10-14 PROCEDURE — 99214 OFFICE O/P EST MOD 30 MIN: CPT | Performed by: NURSE PRACTITIONER

## 2022-10-14 RX ORDER — LINACLOTIDE 145 UG/1
1 CAPSULE, GELATIN COATED ORAL DAILY
COMMUNITY
Start: 2022-09-16 | End: 2024-02-06

## 2022-10-14 ASSESSMENT — ENCOUNTER SYMPTOMS
MYALGIAS: 0
PALPITATIONS: 0
FEVER: 0
ABDOMINAL PAIN: 0
CLAUDICATION: 0
SHORTNESS OF BREATH: 0
PND: 0
ORTHOPNEA: 0
COUGH: 0
DIZZINESS: 0

## 2022-10-14 NOTE — PROGRESS NOTES
Chief Complaint   Patient presents with    Hypertension     F/V DXX: Essential hypertension       Subjective:   Asha Younger is a 61 y.o. female who presents today for follow-up on her left lower extremity edema.    Previous patient of Dr. Gill.  She was last seen in clinic in 2019.  Patient comes back to our clinic to reestablish.    Patient reports she is been feeling well since her last visit.  She does continue to report swelling that comes and goes.  She does take furosemide as needed.    She otherwise denies chest pain, palpitations orthopnea, PND, shortness of breath or dizziness/lightheadedness.    She reports compliance with her medications, she typically takes her a.m. medications around 10 AM.    She reports her home blood pressures typically range around 139-140/80s.    Additonally, patient has the following medical problems:    -Clotting disorder: Protein C deficiency, taking warfarin, history of DVT     -Hypertension: Taking carvedilol, lisinopril, hydrochlorothiazide, furosemide    -Hyperlipidemia: Taking atorvastatin 20 mg    -Diabetes: Taking glipizide, metformin and Januvia    -Sleep apnea: does not tolerating CPAP    -Current smoker: Trying to quit smoking, currently smoking 3 to 4 cigarettes/day    Past Medical History:   Diagnosis Date    Bronchitis     Clotting disorder ([facility])     Diabetes mellitus type 2 in obese ([facility]) 10/11/2011    DVT (deep venous thrombosis) ([facility]) 10/11/2011    Edema 10/11/2011    GERD (gastroesophageal reflux disease) 10/11/2011    HTN (hypertension) 10/11/2011    Hypercoagulable state ([facility]) 10/11/2011    Hyperlipemia 01/22/2013    Hypertension     Nasal drainage     Obesity     Palpitations 12/21/2011    Protein C deficiency ([facility])     Sickle cell trait ([facility])      Past Surgical History:   Procedure Laterality Date    OOPHORECTOMY  1984    RIGHT    LAPAROTOMY      VENTRAL HERNIA REPAIR       Family History   Problem Relation Age of Onset    Clotting Disorder Mother      Kidney Disease Mother     Cancer Mother         lung cancer    Hypertension Father     Cancer Father         Pancreatic  cancer    Heart Failure Father     Clotting Disorder Sister         blood clots, protein deficiency    Other Sister         joint problems    Hypertension Sister     Pulmonary Embolism Sister      Social History     Socioeconomic History    Marital status: Single     Spouse name: Not on file    Number of children: Not on file    Years of education: Not on file    Highest education level: Not on file   Occupational History    Not on file   Tobacco Use    Smoking status: Every Day     Packs/day: 0.25     Years: 30.00     Pack years: 7.50     Types: Cigarettes     Start date: 1989    Smokeless tobacco: Never    Tobacco comments:     3-4 Cigarettes a day   Vaping Use    Vaping Use: Never used   Substance and Sexual Activity    Alcohol use: No    Drug use: No    Sexual activity: Not on file   Other Topics Concern    Not on file   Social History Narrative    Not on file     Social Determinants of Health     Financial Resource Strain: Not on file   Food Insecurity: Not on file   Transportation Needs: Not on file   Physical Activity: Not on file   Stress: Not on file   Social Connections: Not on file   Intimate Partner Violence: Not on file   Housing Stability: Not on file     Allergies   Allergen Reactions    Erythromycin Itching and Nausea     Rxn - many years ago     Outpatient Encounter Medications as of 10/14/2022   Medication Sig Dispense Refill    LINZESS 145 MCG Cap Take 1 Capsule by mouth every day.      rosuvastatin (CRESTOR) 20 MG Tab Take 1 Tab by mouth every evening. 90 Tab 3    warfarin (COUMADIN) 5 MG Tab       carvedilol (COREG) 25 MG Tab Take 25 mg by mouth 2 times a day, with meals.      warfarin (COUMADIN) 10 MG Tab Take 7-10 mg by mouth every evening. 10 mg on Mondays and Fridays   7 mg on all other days  MD monitors her warfarin      metformin (GLUCOPHAGE) 1000 MG tablet Take 1,000  "mg by mouth 2 times a day.      hydrochlorothiazide (HYDRODIURIL) 25 MG Tab Take 25 mg by mouth 2 Times a Day.      lisinopril (PRINIVIL, ZESTRIL) 40 MG tablet Take 40 mg by mouth every day.      glipiZIDE (GLUCOTROL) 10 MG Tab Take 10 mg by mouth every day.      Coenzyme Q10 (CO Q 10 PO) Take 1 Dose by mouth every day.      fluticasone (FLONASE) 50 MCG/ACT nasal spray Spray 1 Spray in nose 1 time daily as needed. Each Nostril       furosemide (LASIX) 40 MG TABS Take 1 Tab by mouth as needed. 10 Tab 3    SITagliptin (JANUVIA) 100 MG Tab Take 100 mg by mouth every day.      magnesium oxide (MAG-OX) 400 MG TABS Take 400 mg by mouth 2 times a day.      [DISCONTINUED] tobramycin (TOBREX) 0.3 % Solution ophthalmic solution  (Patient not taking: Reported on 10/14/2022)       No facility-administered encounter medications on file as of 10/14/2022.     Review of Systems   Constitutional:  Negative for fever and malaise/fatigue.   Respiratory:  Negative for cough and shortness of breath.    Cardiovascular:  Positive for leg swelling (comes and goes). Negative for chest pain, palpitations, orthopnea, claudication and PND.   Gastrointestinal:  Negative for abdominal pain.   Musculoskeletal:  Negative for myalgias.   Neurological:  Negative for dizziness.   All other systems reviewed and are negative.     Objective:   BP (!) 160/84 (BP Location: Left arm, Patient Position: Sitting, BP Cuff Size: Adult)   Pulse 68   Resp 17   Ht 1.702 m (5' 7\")   Wt 122 kg (268 lb)   SpO2 95%   BMI 41.97 kg/m²     Physical Exam  Constitutional:       Appearance: She is well-developed. She is morbidly obese.      Comments: BMI 41.97   HENT:      Head: Normocephalic and atraumatic.   Eyes:      Pupils: Pupils are equal, round, and reactive to light.   Neck:      Vascular: No JVD.   Cardiovascular:      Rate and Rhythm: Normal rate and regular rhythm.      Heart sounds: Normal heart sounds.   Pulmonary:      Effort: Pulmonary effort is " normal. No respiratory distress.      Breath sounds: Normal breath sounds. No wheezing or rales.   Musculoskeletal:      Cervical back: Normal range of motion and neck supple.      Right lower le+ Pitting Edema present.      Left lower le+ Pitting Edema present.   Skin:     General: Skin is warm and dry.   Neurological:      Mental Status: She is alert and oriented to person, place, and time.   Psychiatric:         Behavior: Behavior normal.     Lab Results   Component Value Date/Time    CHOLSTRLTOT 97 (L) 10/31/2019 08:12 AM    CHOLSTRLTOT 143 2013 12:00 AM    LDL 39 10/31/2019 08:12 AM    LDL 70 2013 12:00 AM    HDL 41 10/31/2019 08:12 AM    HDL 48 2013 12:00 AM    TRIGLYCERIDE 84 10/31/2019 08:12 AM    TRIGLYCERIDE 125 2013 12:00 AM       Lab Results   Component Value Date/Time    SODIUM 142 10/31/2019 08:12 AM    SODIUM 138 2014 09:02 PM    POTASSIUM 4.0 10/31/2019 08:12 AM    POTASSIUM 3.4 (L) 2014 09:02 PM    CHLORIDE 100 10/31/2019 08:12 AM    CHLORIDE 101 2014 09:02 PM    CO2 27 10/31/2019 08:12 AM    CO2 29 2014 09:02 PM    GLUCOSE 137 (H) 10/31/2019 08:12 AM    GLUCOSE 143 (H) 2014 09:02 PM    BUN 9 10/31/2019 08:12 AM    BUN 12 2014 09:02 PM    CREATININE 1.01 (H) 10/31/2019 08:12 AM    CREATININE 1.24 2014 09:02 PM    CREATININE 1.05 (H) 2013 12:00 AM    BUNCREATRAT 9 10/31/2019 08:12 AM    BUNCREATRAT 12 2013 12:00 AM     Lab Results   Component Value Date/Time    ALKPHOSPHAT 65 10/31/2019 08:12 AM    ALKPHOSPHAT 85 2013 12:00 AM    ASTSGOT 16 10/31/2019 08:12 AM    ASTSGOT 19 2013 12:00 AM    ALTSGPT 19 10/31/2019 08:12 AM    ALTSGPT 26 2013 12:00 AM    TBILIRUBIN 0.3 10/31/2019 08:12 AM    TBILIRUBIN 0.5 2013 12:00 AM     US of left LE 18 -results reviewed with patient  No left lower extremity deep venous thrombosis    Echo from 2019 and medical record    Assessment:     1. High risk  medication use  Basic Metabolic Panel      2. Hypertension associated with diabetes (HCC)        3. Dyslipidemia associated with type 2 diabetes mellitus (HCC)        4. Morbid obesity with BMI of 40.0-44.9, adult (HCC)        5. History of recurrent deep vein thrombosis (DVT)        6. MISAEL (obstructive sleep apnea)        7. Hypercoagulable state (HCC)        8. Tobacco abuse            Medical Decision Making:  Today's Assessment / Status / Plan:   Hypertension: BP is elevated in clinic today  -Patient reports she has not taken her a.m. medications.  -Home BPs continue to be borderline  -Recommend better control of blood pressure  -Patient to start spironolactone 25 mg daily  -She will obtain a BMP in 1 week to follow high risk medication  -Continue carvedilol 25 mg twice daily  -Continue lisinopril 40 mg daily  -Continue hydrochlorothiazide 25 mg twice a day  -Monitor and log Blood pressures at home. Call office or RTC if BP increasing or >180/100 or if symptoms of elevated blood pressure present. Reviewed s/sx of stroke and heart attack. Patient to go to ER or call 911 if present.     Dyslipidemia: Last LDL 39 on 10/31/2019  -Continue atorvastatin 20 mg daily  -Encouraged patient on complete smoking cessation  -Patient reports she did get lab testing done recently, will request those and see if the lipid panel is included, if not we will order one at her follow-up visit    Protein C deficiency, history of DVT:  -Continue warfarin    History of lower extremity edema:  -She has a little bit of swelling today  -The addition of spironolactone may help this.  -Continue furosemide as needed    Morbid obesity:  -BMI 41.97  -Patient is trying to work on weight loss    Tobacco use:  -Discussed smoking cessation    Sleep apnea:  -Patient reports she has not been using her CPAP as she has not been tolerating her device.  -Discussed the importance of trying again as this can affect her blood pressure.  She reports she will  try again    High Risk Medication Use:  -BMP-labs ordered  -Will continue to closely monitor for side effects of patient's high risk medication(s) including renal function, liver function NTproBNP/cardiac markers, and electrolytes as needed     FU in clinic in 3 month with labs. Sooner if needed.    Patient verbalizes understanding and agrees with the plan of care.     PLEASE NOTE: This Note was created using voice recognition Software. I have made every reasonable attempt to correct obvious errors, but I expect that there are errors of grammar and possibly content that I did not discover before finalizing the note

## 2022-10-17 ENCOUNTER — TELEPHONE (OUTPATIENT)
Dept: CARDIOLOGY | Facility: MEDICAL CENTER | Age: 62
End: 2022-10-17
Payer: MEDICARE

## 2022-10-17 DIAGNOSIS — I15.2 HYPERTENSION ASSOCIATED WITH DIABETES (HCC): ICD-10-CM

## 2022-10-17 DIAGNOSIS — E11.59 HYPERTENSION ASSOCIATED WITH DIABETES (HCC): ICD-10-CM

## 2022-10-17 NOTE — TELEPHONE ENCOUNTER
CHRIS    Caller: - Asha    Topic/issue: Asha is checking n a medication that CHRIS said she was starting her on.  She cannot remember the name.     Callback Number: [phone]    Thank you   Marixa JAMES

## 2022-10-18 RX ORDER — SPIRONOLACTONE 25 MG/1
25 TABLET ORAL DAILY
Qty: 90 TABLET | Refills: 3 | Status: SHIPPED | OUTPATIENT
Start: 2022-10-18 | End: 2023-07-21

## 2022-10-19 NOTE — TELEPHONE ENCOUNTER
Called patient home number and spoke to the patient, per CHRIS last OV note, patient to start spironolactone and do labs 1 weeks after. Confirmed pharmacy, will order medication. She has lab slip. Answered all questions and concerns, appreciative of call.     RX ordered.

## 2022-11-09 LAB
ALBUMIN SERPL-MCNC: 4.3 G/DL (ref 3.8–4.8)
ALBUMIN/GLOB SERPL: 1.4 {RATIO} (ref 1.2–2.2)
ALP SERPL-CCNC: 83 IU/L (ref 44–121)
ALT SERPL-CCNC: 37 IU/L (ref 0–32)
AST SERPL-CCNC: 23 IU/L (ref 0–40)
BILIRUB SERPL-MCNC: 0.4 MG/DL (ref 0–1.2)
BUN SERPL-MCNC: 13 MG/DL (ref 8–27)
BUN/CREAT SERPL: 13 (ref 12–28)
CALCIUM SERPL-MCNC: 9.1 MG/DL (ref 8.7–10.3)
CHLORIDE SERPL-SCNC: 97 MMOL/L (ref 96–106)
CO2 SERPL-SCNC: 23 MMOL/L (ref 20–29)
CREAT SERPL-MCNC: 0.98 MG/DL (ref 0.57–1)
EGFRCR SERPLBLD CKD-EPI 2021: 65 ML/MIN/1.73
GLOBULIN SER CALC-MCNC: 3 G/DL (ref 1.5–4.5)
GLUCOSE SERPL-MCNC: 238 MG/DL (ref 70–99)
POTASSIUM SERPL-SCNC: 4.5 MMOL/L (ref 3.5–5.2)
PROT SERPL-MCNC: 7.3 G/DL (ref 6–8.5)
SODIUM SERPL-SCNC: 133 MMOL/L (ref 134–144)

## 2023-02-15 ENCOUNTER — OFFICE VISIT (OUTPATIENT)
Dept: CARDIOLOGY | Facility: MEDICAL CENTER | Age: 63
End: 2023-02-15
Payer: MEDICARE

## 2023-02-15 VITALS
HEART RATE: 68 BPM | WEIGHT: 265 LBS | SYSTOLIC BLOOD PRESSURE: 124 MMHG | OXYGEN SATURATION: 97 % | DIASTOLIC BLOOD PRESSURE: 66 MMHG | HEIGHT: 67 IN | RESPIRATION RATE: 16 BRPM | BODY MASS INDEX: 41.59 KG/M2

## 2023-02-15 DIAGNOSIS — G47.33 OSA (OBSTRUCTIVE SLEEP APNEA): ICD-10-CM

## 2023-02-15 DIAGNOSIS — I15.2 HYPERTENSION ASSOCIATED WITH DIABETES (HCC): ICD-10-CM

## 2023-02-15 DIAGNOSIS — E11.69 DYSLIPIDEMIA ASSOCIATED WITH TYPE 2 DIABETES MELLITUS (HCC): ICD-10-CM

## 2023-02-15 DIAGNOSIS — Z79.899 HIGH RISK MEDICATION USE: ICD-10-CM

## 2023-02-15 DIAGNOSIS — E11.59 HYPERTENSION ASSOCIATED WITH DIABETES (HCC): ICD-10-CM

## 2023-02-15 DIAGNOSIS — E78.5 DYSLIPIDEMIA ASSOCIATED WITH TYPE 2 DIABETES MELLITUS (HCC): ICD-10-CM

## 2023-02-15 DIAGNOSIS — Z72.0 TOBACCO ABUSE: ICD-10-CM

## 2023-02-15 DIAGNOSIS — D68.59 HYPERCOAGULABLE STATE (HCC): ICD-10-CM

## 2023-02-15 DIAGNOSIS — Z86.718 HISTORY OF RECURRENT DEEP VEIN THROMBOSIS (DVT): ICD-10-CM

## 2023-02-15 DIAGNOSIS — E66.01 MORBID OBESITY WITH BMI OF 40.0-44.9, ADULT (HCC): ICD-10-CM

## 2023-02-15 DIAGNOSIS — R25.2 CRAMPING OF HANDS: ICD-10-CM

## 2023-02-15 PROCEDURE — 99214 OFFICE O/P EST MOD 30 MIN: CPT | Performed by: NURSE PRACTITIONER

## 2023-02-15 RX ORDER — OMEPRAZOLE 20 MG/1
20 CAPSULE, DELAYED RELEASE ORAL PRN
COMMUNITY
Start: 2023-01-11

## 2023-02-15 RX ORDER — TIRZEPATIDE 5 MG/.5ML
INJECTION, SOLUTION SUBCUTANEOUS
COMMUNITY
Start: 2023-01-17 | End: 2024-02-06

## 2023-02-15 RX ORDER — HYDROCODONE BITARTRATE AND ACETAMINOPHEN 5; 325 MG/1; MG/1
TABLET ORAL
COMMUNITY
Start: 2023-01-18

## 2023-02-15 RX ORDER — ALBUTEROL SULFATE 90 UG/1
2 AEROSOL, METERED RESPIRATORY (INHALATION) EVERY 4 HOURS PRN
COMMUNITY
Start: 2023-01-14

## 2023-02-15 ASSESSMENT — ENCOUNTER SYMPTOMS
SHORTNESS OF BREATH: 0
PND: 0
FEVER: 0
DIZZINESS: 1
ABDOMINAL PAIN: 0
PALPITATIONS: 0
ORTHOPNEA: 0
MYALGIAS: 0
COUGH: 0
CLAUDICATION: 0

## 2023-02-15 NOTE — PROGRESS NOTES
Chief Complaint   Patient presents with    Palpitations    Dyslipidemia    Hypertension       Subjective:   Asha Younger is a 62 y.o. female who presents today for follow-up on her left lower extremity edema.    Previous patient of Dr. Gill.  She was last seen in clinic on 11/14/2022.  During her last visit, patient was recommended to start spironolactone 25 mg daily and get lab testing in 1 week.    She reports no problems with the medication.  She does mention having some hand cramps and an occasional foot cramp since starting the spironolactone.  She mentions having a rare episode of lightheadedness.    She reports improvement of her lower extremity edema.  She denies chest pain, palpitations, orthopnea, PND, shortness of breath or dizziness/lightheadedness.    She reports her home weights are stable around 254 pounds.    She continues to smoke 2 to 4 cigarettes/day and is trying to quit.    Additionally, patient has the following medical problems:    -Clotting disorder: Protein C deficiency, taking warfarin, history of DVT     -Hypertension: Taking carvedilol, lisinopril, hydrochlorothiazide, furosemide    -Hyperlipidemia: Taking atorvastatin 20 mg    -Diabetes: Taking glipizide, metformin and monjuo    -Sleep apnea: does not tolerating CPAP    -Current smoker: Trying to quit smoking, currently smoking 3 to 4 cigarettes/day    Past Medical History:   Diagnosis Date    Bronchitis     Clotting disorder ([facility])     Diabetes mellitus type 2 in obese ([facility]) 10/11/2011    DVT (deep venous thrombosis) ([facility]) 10/11/2011    Edema 10/11/2011    GERD (gastroesophageal reflux disease) 10/11/2011    HTN (hypertension) 10/11/2011    Hypercoagulable state ([facility]) 10/11/2011    Hyperlipemia 01/22/2013    Hypertension     Nasal drainage     Obesity     Palpitations 12/21/2011    Protein C deficiency ([facility])     Sickle cell trait ([facility])      Past Surgical History:   Procedure Laterality Date    OOPHORECTOMY  1984    RIGHT     LAPAROTOMY      VENTRAL HERNIA REPAIR       Family History   Problem Relation Age of Onset    Clotting Disorder Mother     Kidney Disease Mother     Cancer Mother         lung cancer    Hypertension Father     Cancer Father         Pancreatic  cancer    Heart Failure Father     Clotting Disorder Sister         blood clots, protein deficiency    Other Sister         joint problems    Hypertension Sister     Pulmonary Embolism Sister      Social History     Socioeconomic History    Marital status: Single     Spouse name: Not on file    Number of children: Not on file    Years of education: Not on file    Highest education level: Not on file   Occupational History    Not on file   Tobacco Use    Smoking status: Every Day     Packs/day: 0.25     Years: 30.00     Pack years: 7.50     Types: Cigarettes     Start date: 1989    Smokeless tobacco: Never    Tobacco comments:     3-4 Cigarettes a day   Vaping Use    Vaping Use: Never used   Substance and Sexual Activity    Alcohol use: No    Drug use: No    Sexual activity: Not on file   Other Topics Concern    Not on file   Social History Narrative    Not on file     Social Determinants of Health     Financial Resource Strain: Not on file   Food Insecurity: Not on file   Transportation Needs: Not on file   Physical Activity: Not on file   Stress: Not on file   Social Connections: Not on file   Intimate Partner Violence: Not on file   Housing Stability: Not on file     Allergies   Allergen Reactions    Erythromycin Itching and Nausea     Rxn - many years ago     Outpatient Encounter Medications as of 2/15/2023   Medication Sig Dispense Refill    MOUNJARO 5 MG/0.5ML Solution Pen-injector ADMINISTER 0.5 ML UNDER THE SKIN WEEKLY      omeprazole (PRILOSEC) 20 MG delayed-release capsule Take 20 mg by mouth every day.      HYDROcodone-acetaminophen (NORCO) 5-325 MG Tab per tablet take 1 tablet by mouth every 8 hours if needed for 7 days      spironolactone (ALDACTONE) 25 MG Tab Take  1 Tablet by mouth every day. 90 Tablet 3    LINZESS 145 MCG Cap Take 1 Capsule by mouth every day.      rosuvastatin (CRESTOR) 20 MG Tab Take 1 Tab by mouth every evening. 90 Tab 3    carvedilol (COREG) 25 MG Tab Take 25 mg by mouth 2 times a day, with meals.      warfarin (COUMADIN) 10 MG Tab Take 7-10 mg by mouth every evening. 10 mg on Mondays and Fridays   7 mg on all other days  MD monitors her warfarin      metformin (GLUCOPHAGE) 1000 MG tablet Take 1,000 mg by mouth 2 times a day.      hydrochlorothiazide (HYDRODIURIL) 25 MG Tab Take 25 mg by mouth 2 Times a Day.      lisinopril (PRINIVIL, ZESTRIL) 40 MG tablet Take 40 mg by mouth every day.      glipiZIDE (GLUCOTROL) 10 MG Tab Take 10 mg by mouth every day.      Coenzyme Q10 (CO Q 10 PO) Take 1 Dose by mouth every day.      fluticasone (FLONASE) 50 MCG/ACT nasal spray Spray 1 Spray in nose 1 time daily as needed. Each Nostril       furosemide (LASIX) 40 MG TABS Take 1 Tab by mouth as needed. 10 Tab 3    magnesium oxide (MAG-OX) 400 MG TABS Take 400 mg by mouth 2 times a day.      VENTOLIN  (90 Base) MCG/ACT Aero Soln inhalation aerosol Inhale 2 Puffs every four hours as needed.      [DISCONTINUED] warfarin (COUMADIN) 5 MG Tab  (Patient not taking: Reported on 2/15/2023)      [DISCONTINUED] SITagliptin (JANUVIA) 100 MG Tab Take 100 mg by mouth every day. (Patient not taking: Reported on 2/15/2023)       No facility-administered encounter medications on file as of 2/15/2023.     Review of Systems   Constitutional:  Negative for fever and malaise/fatigue.   Respiratory:  Negative for cough and shortness of breath.    Cardiovascular:  Negative for chest pain, palpitations, orthopnea, claudication, leg swelling (Improved) and PND.   Gastrointestinal:  Negative for abdominal pain.   Musculoskeletal:  Negative for myalgias.        Hand cramps, foot cramp   Neurological:  Positive for dizziness (Rare lightheadedness).   All other systems reviewed and are  "negative.     Objective:   /66 (BP Location: Left arm, Patient Position: Sitting, BP Cuff Size: Adult)   Pulse 68   Resp 16   Ht 1.702 m (5' 7\")   Wt 120 kg (265 lb)   SpO2 97%   BMI 41.50 kg/m²     Physical Exam  Constitutional:       Appearance: She is well-developed. She is morbidly obese.      Comments: BMI 41.50   HENT:      Head: Normocephalic and atraumatic.   Eyes:      Pupils: Pupils are equal, round, and reactive to light.   Neck:      Vascular: No JVD.   Cardiovascular:      Rate and Rhythm: Normal rate and regular rhythm.      Heart sounds: Normal heart sounds.   Pulmonary:      Effort: Pulmonary effort is normal. No respiratory distress.      Breath sounds: Normal breath sounds. No wheezing or rales.   Musculoskeletal:      Cervical back: Normal range of motion and neck supple.      Right lower leg: No edema.      Left lower leg: No edema.   Skin:     General: Skin is warm and dry.   Neurological:      Mental Status: She is alert and oriented to person, place, and time.   Psychiatric:         Behavior: Behavior normal.     Lab Results   Component Value Date/Time    CHOLSTRLTOT 97 (L) 10/31/2019 08:12 AM    CHOLSTRLTOT 143 01/08/2013 12:00 AM    LDL 39 10/31/2019 08:12 AM    LDL 70 01/08/2013 12:00 AM    HDL 41 10/31/2019 08:12 AM    HDL 48 01/08/2013 12:00 AM    TRIGLYCERIDE 84 10/31/2019 08:12 AM    TRIGLYCERIDE 125 01/08/2013 12:00 AM       Lab Results   Component Value Date/Time    SODIUM 133 (L) 11/08/2022 08:17 AM    SODIUM 138 04/20/2014 09:02 PM    POTASSIUM 4.5 11/08/2022 08:17 AM    POTASSIUM 3.4 (L) 04/20/2014 09:02 PM    CHLORIDE 97 11/08/2022 08:17 AM    CHLORIDE 101 04/20/2014 09:02 PM    CO2 23 11/08/2022 08:17 AM    CO2 29 04/20/2014 09:02 PM    GLUCOSE 238 (H) 11/08/2022 08:17 AM    GLUCOSE 143 (H) 04/20/2014 09:02 PM    BUN 13 11/08/2022 08:17 AM    BUN 12 04/20/2014 09:02 PM    CREATININE 0.98 11/08/2022 08:17 AM    CREATININE 1.24 04/20/2014 09:02 PM    CREATININE 1.05 (H) " 01/08/2013 12:00 AM    BUNCREATRAT 13 11/08/2022 08:17 AM    BUNCREATRAT 12 01/08/2013 12:00 AM     Lab Results   Component Value Date/Time    ALKPHOSPHAT 83 11/08/2022 08:17 AM    ALKPHOSPHAT 85 01/08/2013 12:00 AM    ASTSGOT 23 11/08/2022 08:17 AM    ASTSGOT 19 01/08/2013 12:00 AM    ALTSGPT 37 (H) 11/08/2022 08:17 AM    ALTSGPT 26 01/08/2013 12:00 AM    TBILIRUBIN 0.4 11/08/2022 08:17 AM    TBILIRUBIN 0.5 01/08/2013 12:00 AM     US of left LE 5/21/18 -results reviewed with patient  No left lower extremity deep venous thrombosis    Echo from 1/23/2019 and medical record    Assessment:     1. High risk medication use  Basic Metabolic Panel    Comp Metabolic Panel    Lipid Profile      2. Cramping of hands  Basic Metabolic Panel      3. Dyslipidemia associated with type 2 diabetes mellitus (HCC)  Comp Metabolic Panel    Lipid Profile      4. Hypertension associated with diabetes (HCC)  Comp Metabolic Panel    Lipid Profile      5. Tobacco abuse  Comp Metabolic Panel    Lipid Profile      6. Morbid obesity with BMI of 40.0-44.9, adult (HCC)        7. MISAEL (obstructive sleep apnea)        8. Hypercoagulable state (HCC)        9. History of recurrent deep vein thrombosis (DVT)            Medical Decision Making:  Today's Assessment / Status / Plan:   Hypertension: BP stable  -Discussed obtaining some lab test to evaluate her hand cramps with a BMP today  -Patient reports she is hydrating and taking magnesium supplements.  She reports that though her hand cramps are bothersome, she probably would not make changes on her meds unless necessary.  -Continue spironolactone 25 mg daily  -Continue carvedilol 25 mg twice daily  -Continue lisinopril 40 mg daily  -Continue hydrochlorothiazide 25 mg twice a day  -Monitor and log Blood pressures at home. Call office or RTC if BP increasing or >180/100 or if symptoms of elevated blood pressure present. Reviewed s/sx of stroke and heart attack. Patient to go to ER or call 911 if  present.     Dyslipidemia: Last LDL 54 on 10/7/2022 ([facility])  -Continue atorvastatin 20 mg daily  -Encouraged patient on complete smoking cessation  -CMP and lipid panel in 6 months    Protein C deficiency, history of DVT:  -Continue warfarin  -followed by PCP in [place]     History of lower extremity edema:  -Swelling improved.  -Continue furosemide as needed, spironolactone and HCTZ    Morbid obesity:  -BMI 41.50  -Patient is trying to work on weight loss    Tobacco use:  -Discussed smoking cessation    Sleep apnea:  -Patient reports she has not been using her CPAP as she has not been tolerating her device.  -Discussed the importance of trying again as this can affect her blood pressure.  She reports she will try again    High Risk Medication Use:  -BMP in 1 week-labs ordered.  Patient to be notified if testing abnormal  -Will continue to closely monitor for side effects of patient's high risk medication(s) including renal function, liver function NTproBNP/cardiac markers, and electrolytes as needed     FU in clinic in 6 month with labs. Sooner if needed.    Patient verbalizes understanding and agrees with the plan of care.     PLEASE NOTE: This Note was created using voice recognition Software. I have made every reasonable attempt to correct obvious errors, but I expect that there are errors of grammar and possibly content that I did not discover before finalizing the note

## 2023-02-16 LAB
BUN SERPL-MCNC: 14 MG/DL (ref 8–27)
BUN/CREAT SERPL: 13 (ref 12–28)
CALCIUM SERPL-MCNC: 8.9 MG/DL (ref 8.7–10.3)
CHLORIDE SERPL-SCNC: 102 MMOL/L (ref 96–106)
CO2 SERPL-SCNC: 24 MMOL/L (ref 20–29)
CREAT SERPL-MCNC: 1.05 MG/DL (ref 0.57–1)
EGFRCR SERPLBLD CKD-EPI 2021: 60 ML/MIN/1.73
GLUCOSE SERPL-MCNC: 278 MG/DL (ref 70–99)
POTASSIUM SERPL-SCNC: 4.6 MMOL/L (ref 3.5–5.2)
SODIUM SERPL-SCNC: 138 MMOL/L (ref 134–144)

## 2023-03-14 ENCOUNTER — TELEPHONE (OUTPATIENT)
Dept: CARDIOLOGY | Facility: MEDICAL CENTER | Age: 63
End: 2023-03-14
Payer: MEDICARE

## 2023-03-14 NOTE — TELEPHONE ENCOUNTER
CHRIS            Caller: Asha Younger      Topic/issue: Patient was calling about the results of her blood work and she was asking for a call back      Callback Number: [phone]      Thank you    -Jose RAGSDALE

## 2023-04-25 ENCOUNTER — TELEPHONE (OUTPATIENT)
Dept: CARDIOLOGY | Facility: MEDICAL CENTER | Age: 63
End: 2023-04-25
Payer: MEDICARE

## 2023-04-25 NOTE — TELEPHONE ENCOUNTER
Returned Janie's call. She states that Dr. Avila would like to discuss this pt's POC w/ CHRIS. Pt has been diagnosed w/ cirrhosis and would like CHRIS's input. CHRIS can LVM for him and he will call her back if unable to answer at the time of the call.

## 2023-04-25 NOTE — TELEPHONE ENCOUNTER
CHRIS     Caller: Janie with [facility]     Topic/issue: [facility] would like to speak with Tea Khan or care team to discuss future treatment of the patient. Please advise.     Callback Number: N/A     Thank you,   Trudy RICHARDSON

## 2023-04-25 NOTE — TELEPHONE ENCOUNTER
CHRIS          Caller: Janie rosario [facility]      Topic/issue: Their office was returning our call and was asking for a call back      Callback Number: [phone]      Thank you      -Jose RAGSDALE

## 2023-04-27 NOTE — TELEPHONE ENCOUNTER
Attempted to call Dr. Avila again, his phone went straight to voicemail.  Did not leave another voicemail.  Also attempted to call Janie, and left a message for her there stating that I have not heard back from Dr. Avila.  Message left for her to call me back at [phone]

## 2023-07-20 DIAGNOSIS — Z79.899 HIGH RISK MEDICATION USE: ICD-10-CM

## 2023-07-20 DIAGNOSIS — I15.2 HYPERTENSION ASSOCIATED WITH DIABETES (HCC): ICD-10-CM

## 2023-07-20 DIAGNOSIS — E11.59 HYPERTENSION ASSOCIATED WITH DIABETES (HCC): ICD-10-CM

## 2023-07-21 RX ORDER — SPIRONOLACTONE 25 MG/1
25 TABLET ORAL DAILY
Qty: 90 TABLET | Refills: 3 | Status: SHIPPED | OUTPATIENT
Start: 2023-07-21

## 2023-08-04 ENCOUNTER — TELEPHONE (OUTPATIENT)
Dept: CARDIOLOGY | Facility: MEDICAL CENTER | Age: 63
End: 2023-08-04
Payer: MEDICARE

## 2023-08-04 NOTE — TELEPHONE ENCOUNTER
Left message that If she has scheduled her labs that were ordered or if she has had them completed outside of [facility] to let us know so we can send a request for them

## 2023-08-23 ENCOUNTER — OFFICE VISIT (OUTPATIENT)
Dept: CARDIOLOGY | Facility: MEDICAL CENTER | Age: 63
End: 2023-08-23
Attending: NURSE PRACTITIONER
Payer: MEDICARE

## 2023-08-23 VITALS
BODY MASS INDEX: 43 KG/M2 | HEART RATE: 70 BPM | OXYGEN SATURATION: 98 % | DIASTOLIC BLOOD PRESSURE: 80 MMHG | WEIGHT: 274 LBS | HEIGHT: 67 IN | SYSTOLIC BLOOD PRESSURE: 122 MMHG | RESPIRATION RATE: 18 BRPM

## 2023-08-23 DIAGNOSIS — D68.59 HYPERCOAGULABLE STATE (HCC): ICD-10-CM

## 2023-08-23 DIAGNOSIS — E11.59 HYPERTENSION ASSOCIATED WITH DIABETES (HCC): ICD-10-CM

## 2023-08-23 DIAGNOSIS — E66.01 MORBID OBESITY WITH BMI OF 40.0-44.9, ADULT (HCC): ICD-10-CM

## 2023-08-23 DIAGNOSIS — G47.33 OSA (OBSTRUCTIVE SLEEP APNEA): ICD-10-CM

## 2023-08-23 DIAGNOSIS — I15.2 HYPERTENSION ASSOCIATED WITH DIABETES (HCC): ICD-10-CM

## 2023-08-23 DIAGNOSIS — Z72.0 TOBACCO ABUSE: ICD-10-CM

## 2023-08-23 DIAGNOSIS — E11.69 DYSLIPIDEMIA ASSOCIATED WITH TYPE 2 DIABETES MELLITUS (HCC): ICD-10-CM

## 2023-08-23 DIAGNOSIS — E78.5 DYSLIPIDEMIA ASSOCIATED WITH TYPE 2 DIABETES MELLITUS (HCC): ICD-10-CM

## 2023-08-23 DIAGNOSIS — Z79.899 HIGH RISK MEDICATION USE: ICD-10-CM

## 2023-08-23 DIAGNOSIS — R60.0 BILATERAL LEG EDEMA: ICD-10-CM

## 2023-08-23 PROCEDURE — 99203 OFFICE O/P NEW LOW 30 MIN: CPT | Performed by: NURSE PRACTITIONER

## 2023-08-23 PROCEDURE — 99214 OFFICE O/P EST MOD 30 MIN: CPT | Performed by: NURSE PRACTITIONER

## 2023-08-23 PROCEDURE — 3074F SYST BP LT 130 MM HG: CPT | Performed by: NURSE PRACTITIONER

## 2023-08-23 PROCEDURE — 3079F DIAST BP 80-89 MM HG: CPT | Performed by: NURSE PRACTITIONER

## 2023-08-23 RX ORDER — URSODIOL 300 MG/1
300 CAPSULE ORAL 2 TIMES DAILY
COMMUNITY

## 2023-08-23 ASSESSMENT — ENCOUNTER SYMPTOMS
DIZZINESS: 0
ABDOMINAL PAIN: 0
FEVER: 0
CLAUDICATION: 0
MYALGIAS: 0
PALPITATIONS: 0
SHORTNESS OF BREATH: 0
ORTHOPNEA: 0
PND: 0
COUGH: 0

## 2023-08-23 NOTE — PROGRESS NOTES
Chief Complaint   Patient presents with    Palpitations    Hypertension    Dyslipidemia       Subjective:   Asha Younger is a 62 y.o. female who presents today for follow-up on her HTN, left lower extremity edema.    Previous patient of Dr. Gill.  She was last seen in clinic on 2/15/2023.    No changes were made during her last visit, she was recommended to get follow-up lab testing.    Since her last visit, her GI provider, Dr. Avila reached out to our office.  Several callback attempts were made, with no return of call.    Patient reports that the doctor was trying to see if she could have a biopsy of her liver.  She is currently being treated for biliary cholangitis.  She did start a new medication and set up a biopsy as she reports Dr. Avila discussed her case with his colleagues.    Patient reports she is having some worsening left lower extremity edema, she has not been using her diuretics.    She otherwise denies chest pain, palpitations, with apnea, PND, shortness of breath or dizziness/lightheadedness.    She reports she last weighed herself last week and she was around 263 pounds.    She continues to smoke 3 cigarettes/day and is trying to quit.    She reports she has been working with her endocrinologist? regarding her diabetes and was recently started on insulin.    Additionally, patient has the following medical problems:    -Clotting disorder: Protein C deficiency, taking warfarin, history of DVT     -Hypertension: Taking carvedilol, lisinopril, hydrochlorothiazide, furosemide    -Hyperlipidemia: Taking atorvastatin 20 mg    -Diabetes: Taking glipizide, metformin and monjuo    -Sleep apnea: does not tolerating CPAP    -Current smoker: Trying to quit smoking, currently smoking 3 to 4 cigarettes/day    Past Medical History:   Diagnosis Date    Bronchitis     Clotting disorder (HCC)     Diabetes mellitus type 2 in obese (HCC) 10/11/2011    DVT (deep venous thrombosis) (HCC) 10/11/2011     Edema 10/11/2011    GERD (gastroesophageal reflux disease) 10/11/2011    HTN (hypertension) 10/11/2011    Hypercoagulable state (HCC) 10/11/2011    Hyperlipemia 01/22/2013    Hypertension     Nasal drainage     Obesity     Palpitations 12/21/2011    Protein C deficiency (HCC)     Sickle cell trait (HCC)      Past Surgical History:   Procedure Laterality Date    OOPHORECTOMY  1984    RIGHT    LAPAROTOMY      VENTRAL HERNIA REPAIR       Family History   Problem Relation Age of Onset    Clotting Disorder Mother     Kidney Disease Mother     Cancer Mother         lung cancer    Hypertension Father     Cancer Father         Pancreatic  cancer    Heart Failure Father     Clotting Disorder Sister         blood clots, protein deficiency    Other Sister         joint problems    Hypertension Sister     Pulmonary Embolism Sister      Social History     Socioeconomic History    Marital status: Single     Spouse name: Not on file    Number of children: Not on file    Years of education: Not on file    Highest education level: Not on file   Occupational History    Not on file   Tobacco Use    Smoking status: Every Day     Current packs/day: 0.25     Average packs/day: 0.3 packs/day for 34.6 years (8.7 ttl pk-yrs)     Types: Cigarettes     Start date: 1989    Smokeless tobacco: Never    Tobacco comments:     3-4 Cigarettes a day   Vaping Use    Vaping Use: Never used   Substance and Sexual Activity    Alcohol use: No    Drug use: No    Sexual activity: Not on file   Other Topics Concern    Not on file   Social History Narrative    Not on file     Social Determinants of Health     Financial Resource Strain: Not on file   Food Insecurity: Not on file   Transportation Needs: Not on file   Physical Activity: Not on file   Stress: Not on file   Social Connections: Not on file   Intimate Partner Violence: Not on file   Housing Stability: Not on file     Allergies   Allergen Reactions    Erythromycin Itching and Nausea     Rxn - many  years ago     Outpatient Encounter Medications as of 8/23/2023   Medication Sig Dispense Refill    ursodiol (ACTIGALL) 300 MG Cap Take 300 mg by mouth 2 times a day.      Insulin Glargine (BASAGLAR TEMPO PEN SC) Inject  under the skin every day.      spironolactone (ALDACTONE) 25 MG Tab Take 1 Tablet by mouth every day. Please complete lab work for further refills. 90 Tablet 3    MOUNJARO 5 MG/0.5ML Solution Pen-injector ADMINISTER 0.5 ML UNDER THE SKIN WEEKLY      omeprazole (PRILOSEC) 20 MG delayed-release capsule Take 20 mg by mouth as needed.      HYDROcodone-acetaminophen (NORCO) 5-325 MG Tab per tablet take 1 tablet by mouth every 8 hours if needed for 7 days      VENTOLIN  (90 Base) MCG/ACT Aero Soln inhalation aerosol Inhale 2 Puffs every four hours as needed.      LINZESS 145 MCG Cap Take 1 Capsule by mouth every day.      rosuvastatin (CRESTOR) 20 MG Tab Take 1 Tab by mouth every evening. 90 Tab 3    carvedilol (COREG) 25 MG Tab Take 25 mg by mouth 2 times a day, with meals.      warfarin (COUMADIN) 10 MG Tab Take 7-10 mg by mouth every evening. 10 mg on Mondays and Fridays   7 mg on all other days  MD monitors her warfarin      metformin (GLUCOPHAGE) 1000 MG tablet Take 1,000 mg by mouth 2 times a day.      hydrochlorothiazide (HYDRODIURIL) 25 MG Tab Take 25 mg by mouth 2 Times a Day.      lisinopril (PRINIVIL, ZESTRIL) 40 MG tablet Take 40 mg by mouth every day.      glipiZIDE (GLUCOTROL) 10 MG Tab Take 10 mg by mouth every day.      Coenzyme Q10 (CO Q 10 PO) Take 1 Dose by mouth every day.      fluticasone (FLONASE) 50 MCG/ACT nasal spray Spray 1 Spray in nose 1 time daily as needed. Each Nostril       furosemide (LASIX) 40 MG TABS Take 1 Tab by mouth as needed. 10 Tab 3    magnesium oxide (MAG-OX) 400 MG TABS Take 400 mg by mouth 2 times a day.       No facility-administered encounter medications on file as of 8/23/2023.     Review of Systems   Constitutional:  Negative for fever and  "malaise/fatigue.   Respiratory:  Negative for cough and shortness of breath.    Cardiovascular:  Positive for leg swelling. Negative for chest pain, palpitations, orthopnea, claudication and PND.   Gastrointestinal:  Negative for abdominal pain.   Musculoskeletal:  Negative for myalgias.   Neurological:  Negative for dizziness.   All other systems reviewed and are negative.       Objective:   /80 (BP Location: Left arm, Patient Position: Sitting, BP Cuff Size: Adult)   Pulse 70   Resp 18   Ht 1.702 m (5' 7\")   Wt 124 kg (274 lb)   SpO2 98%   BMI 42.91 kg/m²     Physical Exam  Constitutional:       Appearance: She is well-developed. She is morbidly obese.      Comments: BMI 41.50   HENT:      Head: Normocephalic and atraumatic.   Eyes:      Pupils: Pupils are equal, round, and reactive to light.   Neck:      Vascular: No JVD.   Cardiovascular:      Rate and Rhythm: Normal rate and regular rhythm.      Heart sounds: Normal heart sounds.   Pulmonary:      Effort: Pulmonary effort is normal. No respiratory distress.      Breath sounds: Normal breath sounds. No wheezing or rales.   Musculoskeletal:      Cervical back: Normal range of motion and neck supple.      Right lower leg: No edema.      Left lower leg: No edema.   Skin:     General: Skin is warm and dry.   Neurological:      Mental Status: She is alert and oriented to person, place, and time.   Psychiatric:         Behavior: Behavior normal.       Lab Results   Component Value Date/Time    CHOLSTRLTOT 123 08/15/2023 08:19 AM    CHOLSTRLTOT 143 01/08/2013 12:00 AM    LDL 39 10/31/2019 08:12 AM    LDL 70 01/08/2013 12:00 AM    HDL 48 08/15/2023 08:19 AM    HDL 48 01/08/2013 12:00 AM    TRIGLYCERIDE 93 08/15/2023 08:19 AM    TRIGLYCERIDE 125 01/08/2013 12:00 AM       Lab Results   Component Value Date/Time    SODIUM 136 08/15/2023 08:19 AM    SODIUM 138 04/20/2014 09:02 PM    POTASSIUM 5.2 08/15/2023 08:19 AM    POTASSIUM 3.4 (L) 04/20/2014 09:02 PM    " CHLORIDE 101 08/15/2023 08:19 AM    CHLORIDE 101 04/20/2014 09:02 PM    CO2 23 08/15/2023 08:19 AM    CO2 29 04/20/2014 09:02 PM    GLUCOSE 110 (H) 08/15/2023 08:19 AM    GLUCOSE 143 (H) 04/20/2014 09:02 PM    BUN 16 08/15/2023 08:19 AM    BUN 12 04/20/2014 09:02 PM    CREATININE 1.06 (H) 08/15/2023 08:19 AM    CREATININE 1.24 04/20/2014 09:02 PM    CREATININE 1.05 (H) 01/08/2013 12:00 AM    BUNCREATRAT 15 08/15/2023 08:19 AM    BUNCREATRAT 12 01/08/2013 12:00 AM     Lab Results   Component Value Date/Time    ALKPHOSPHAT 80 08/15/2023 08:19 AM    ALKPHOSPHAT 85 01/08/2013 12:00 AM    ASTSGOT 17 08/15/2023 08:19 AM    ASTSGOT 19 01/08/2013 12:00 AM    ALTSGPT 20 08/15/2023 08:19 AM    ALTSGPT 26 01/08/2013 12:00 AM    TBILIRUBIN 0.4 08/15/2023 08:19 AM    TBILIRUBIN 0.5 01/08/2013 12:00 AM     US of left LE 5/21/18 -results reviewed with patient  No left lower extremity deep venous thrombosis    Echo from 1/23/2019 in medical record    Assessment:     1. Bilateral leg edema  EC-ECHOCARDIOGRAM COMPLETE W/O CONT      2. Hypertension associated with diabetes ([facility])        3. High risk medication use        4. Tobacco abuse        5. Dyslipidemia associated with type 2 diabetes mellitus ([facility])        6. MISAEL (obstructive sleep apnea)        7. Hypercoagulable state ([facility])        8. Morbid obesity with BMI of 40.0-44.9, adult ([facility])            Medical Decision Making:  Today's Assessment / Status / Plan:   Hypertension: BP stable  -Continue spironolactone 25 mg daily  -Continue carvedilol 25 mg twice daily  -Continue lisinopril 40 mg daily  -Continue hydrochlorothiazide 25 mg twice a day  -Monitor and log Blood pressures at home. Call office or RTC if BP increasing or >180/100 or if symptoms of elevated blood pressure present. Reviewed s/sx of stroke and heart attack. Patient to go to ER or call 911 if present.     Dyslipidemia: Recent LDL 57 on 8/15/2023  -Continue atorvastatin 20 mg daily  -Encouraged patient on complete  smoking cessation    Protein C deficiency, history of DVT:  -Continue warfarin  -followed by PCP in [place]   -If patient needing biopsy, patient could be bridged with Lovenox    History of lower extremity edema:  -Swelling slightly worsened, recommend patient to use her furosemide  -Continue furosemide as needed, spironolactone and HCTZ  -will have her repeat her echo    Morbid obesity:  -BMI 42.91  -Patient is trying to work on weight loss, but recently has gained weight due to her recent start of insulin    Tobacco use:  -Discussed smoking cessation    Sleep apnea:  -She reports she has been trying to use her CPAP, does continue to take it off in the night, but reports about 6 hours of use.  -Encourage patient to continue to use CPAP    High Risk Medication Use:  -No labs due at this time.  Recent lab test results reviewed with patient  -Will continue to closely monitor for side effects of patient's high risk medication(s) including renal function, liver function NTproBNP/cardiac markers, and electrolytes as needed     FU in clinic in 6 month with echo. Sooner if needed.    Patient verbalizes understanding and agrees with the plan of care.     PLEASE NOTE: This Note was created using voice recognition Software. I have made every reasonable attempt to correct obvious errors, but I expect that there are errors of grammar and possibly content that I did not discover before finalizing the note

## 2023-12-08 ENCOUNTER — HOSPITAL ENCOUNTER (OUTPATIENT)
Dept: CARDIOLOGY | Facility: MEDICAL CENTER | Age: 63
End: 2023-12-08
Attending: NURSE PRACTITIONER
Payer: MEDICARE

## 2023-12-08 DIAGNOSIS — R60.0 BILATERAL LEG EDEMA: ICD-10-CM

## 2023-12-08 LAB
LV EJECT FRACT  99904: 55
LV EJECT FRACT MOD 2C 99903: 50.25
LV EJECT FRACT MOD 4C 99902: 58.06
LV EJECT FRACT MOD BP 99901: 54.07

## 2023-12-08 PROCEDURE — 93306 TTE W/DOPPLER COMPLETE: CPT | Mod: 26 | Performed by: INTERNAL MEDICINE

## 2023-12-08 PROCEDURE — 93306 TTE W/DOPPLER COMPLETE: CPT

## 2023-12-13 ENCOUNTER — TELEPHONE (OUTPATIENT)
Dept: CARDIOLOGY | Facility: MEDICAL CENTER | Age: 63
End: 2023-12-13
Payer: MEDICARE

## 2023-12-13 NOTE — RESULT ENCOUNTER NOTE
Please tell patient that her echo is slightly abnormal, please set her up with a SIDNEY with Dr. Camargo to evaluate PFO

## 2023-12-13 NOTE — TELEPHONE ENCOUNTER
Message  Received: Yesterday  MOIZ Cotton R.N.  Please tell patient that her echo is slightly abnormal, please set her up with a SIDNEY with Dr. Camargo to evaluate PFO          EC-ECHOCARDIOGRAM COMPLETE W/O CONT  -------------------------------------------------------------------------------------    Called pt and informed her. Pt would like to set up an appt to further d/w a provider first. Unable to transfer pt at this time, informed pt I will ask scheduling to contact her.    To scheduling, please contact pt to set up appt asa

## 2024-02-06 ENCOUNTER — OFFICE VISIT (OUTPATIENT)
Dept: CARDIOLOGY | Facility: MEDICAL CENTER | Age: 64
End: 2024-02-06
Attending: NURSE PRACTITIONER
Payer: MEDICARE

## 2024-02-06 VITALS
BODY MASS INDEX: 42.35 KG/M2 | OXYGEN SATURATION: 98 % | SYSTOLIC BLOOD PRESSURE: 130 MMHG | RESPIRATION RATE: 14 BRPM | WEIGHT: 269.8 LBS | HEIGHT: 67 IN | DIASTOLIC BLOOD PRESSURE: 84 MMHG | HEART RATE: 76 BPM

## 2024-02-06 DIAGNOSIS — E66.01 MORBID OBESITY WITH BMI OF 40.0-44.9, ADULT (HCC): ICD-10-CM

## 2024-02-06 DIAGNOSIS — Z79.899 HIGH RISK MEDICATION USE: ICD-10-CM

## 2024-02-06 DIAGNOSIS — E78.5 DYSLIPIDEMIA ASSOCIATED WITH TYPE 2 DIABETES MELLITUS (HCC): ICD-10-CM

## 2024-02-06 DIAGNOSIS — I15.2 HYPERTENSION ASSOCIATED WITH DIABETES (HCC): ICD-10-CM

## 2024-02-06 DIAGNOSIS — E11.59 HYPERTENSION ASSOCIATED WITH DIABETES (HCC): ICD-10-CM

## 2024-02-06 DIAGNOSIS — G47.33 OSA (OBSTRUCTIVE SLEEP APNEA): ICD-10-CM

## 2024-02-06 DIAGNOSIS — Z86.718 HISTORY OF RECURRENT DEEP VEIN THROMBOSIS (DVT): ICD-10-CM

## 2024-02-06 DIAGNOSIS — E11.69 DYSLIPIDEMIA ASSOCIATED WITH TYPE 2 DIABETES MELLITUS (HCC): ICD-10-CM

## 2024-02-06 DIAGNOSIS — Z72.0 TOBACCO ABUSE: ICD-10-CM

## 2024-02-06 DIAGNOSIS — D68.59 HYPERCOAGULABLE STATE (HCC): ICD-10-CM

## 2024-02-06 DIAGNOSIS — R60.0 BILATERAL LEG EDEMA: ICD-10-CM

## 2024-02-06 PROCEDURE — 3079F DIAST BP 80-89 MM HG: CPT | Performed by: NURSE PRACTITIONER

## 2024-02-06 PROCEDURE — 3075F SYST BP GE 130 - 139MM HG: CPT | Performed by: NURSE PRACTITIONER

## 2024-02-06 PROCEDURE — 99213 OFFICE O/P EST LOW 20 MIN: CPT | Performed by: NURSE PRACTITIONER

## 2024-02-06 PROCEDURE — 99214 OFFICE O/P EST MOD 30 MIN: CPT | Performed by: NURSE PRACTITIONER

## 2024-02-06 ASSESSMENT — ENCOUNTER SYMPTOMS
ABDOMINAL PAIN: 0
PND: 0
SHORTNESS OF BREATH: 0
FEVER: 0
DIZZINESS: 0
ORTHOPNEA: 0
PALPITATIONS: 0
MYALGIAS: 0
CLAUDICATION: 0
COUGH: 0

## 2024-02-06 NOTE — PROGRESS NOTES
Chief Complaint   Patient presents with    Follow-Up     F/V to discuss possible SIDNEY  DX: High risk medication use  Cramping of hands  Dyslipidemia associated with type 2 diabetes mellitus (HCC)          Subjective:   Asha Younger is a 63 y.o. female who presents today for follow-up on her HTN, left lower extremity edema.    Previous patient of Dr. Gill.  She was last seen in clinic on 8/23/2023.  During that visit, patient was sent for repeat echocardiogram.    Patient reports she has been doing okay, on does continue to have lower extremity edema, she reports that her edema is chronic and related to her prior history of DVTs.    She denies chest pain, palpitations, with apnea, PND, shortness of breath or dizziness/lightheadedness.    She continues to smoke 3 cigarettes/day and sometimes she does not smoke at all.  She is trying to quit.    She reports she has been working with her endocrinologist? regarding her diabetes and was recently started on insulin.    Additionally, patient has the following medical problems:    -Clotting disorder: Protein C deficiency, taking warfarin, history of DVT     -Hypertension: Taking carvedilol, lisinopril, hydrochlorothiazide, furosemide    -Hyperlipidemia: Taking atorvastatin 20 mg    -Diabetes: Taking glipizide, metformin and mounjaro, followed by endocrinology    -Sleep apnea: does not tolerating CPAP    -Current smoker: Trying to quit smoking, currently smoking 3 cigarettes/day    Past Medical History:   Diagnosis Date    Bronchitis     Clotting disorder ([facility])     Diabetes mellitus type 2 in obese ([facility]) 10/11/2011    DVT (deep venous thrombosis) ([facility]) 10/11/2011    Edema 10/11/2011    GERD (gastroesophageal reflux disease) 10/11/2011    HTN (hypertension) 10/11/2011    Hypercoagulable state ([facility]) 10/11/2011    Hyperlipemia 01/22/2013    Hypertension     Nasal drainage     Obesity     Palpitations 12/21/2011    Protein C deficiency ([facility])     Sickle cell trait ([facility])       Past Surgical History:   Procedure Laterality Date    OOPHORECTOMY  1984    RIGHT    LAPAROTOMY      VENTRAL HERNIA REPAIR       Family History   Problem Relation Age of Onset    Clotting Disorder Mother     Kidney Disease Mother     Cancer Mother         lung cancer    Hypertension Father     Cancer Father         Pancreatic  cancer    Heart Failure Father     Clotting Disorder Sister         blood clots, protein deficiency    Other Sister         joint problems    Hypertension Sister     Pulmonary Embolism Sister      Social History     Socioeconomic History    Marital status: Single     Spouse name: Not on file    Number of children: Not on file    Years of education: Not on file    Highest education level: Not on file   Occupational History    Not on file   Tobacco Use    Smoking status: Every Day     Current packs/day: 0.25     Average packs/day: 0.3 packs/day for 35.1 years (8.8 ttl pk-yrs)     Types: Cigarettes     Start date: 1989    Smokeless tobacco: Never    Tobacco comments:     3-4 Cigarettes a day   Vaping Use    Vaping Use: Never used   Substance and Sexual Activity    Alcohol use: No    Drug use: No    Sexual activity: Not on file   Other Topics Concern    Not on file   Social History Narrative    Not on file     Social Determinants of Health     Financial Resource Strain: Not on file   Food Insecurity: Not on file   Transportation Needs: Not on file   Physical Activity: Not on file   Stress: Not on file   Social Connections: Not on file   Intimate Partner Violence: Not on file   Housing Stability: Not on file     Allergies   Allergen Reactions    Erythromycin Itching and Nausea     Rxn - many years ago     Outpatient Encounter Medications as of 2/6/2024   Medication Sig Dispense Refill    warfarin (COUMADIN) 7.5 MG Tab Take 7.5 mg by mouth every day.      BD PEN NEEDLE ZUNILDA 2ND GEN USE 1 PEN NEEDLE SUBCUTANEOUSLY DAILY      INSULIN GLARGINE 100 UNIT/ML injection PEN ADMINISTER UP TO 24 UNITS  UNDER THE SKIN DAILY      LINZESS 290 MCG Cap Take 1 Capsule by mouth every day.      MOUNJARO 7.5 MG/0.5ML Solution Pen-injector INJECT 7.5 MG UNDER THE SKIN ONE DAY A WEEK      ursodiol (ACTIGALL) 300 MG Cap Take 300 mg by mouth 2 times a day.      Insulin Glargine (BASAGLAR TEMPO PEN SC) Inject  under the skin every day.      spironolactone (ALDACTONE) 25 MG Tab Take 1 Tablet by mouth every day. Please complete lab work for further refills. 90 Tablet 3    omeprazole (PRILOSEC) 20 MG delayed-release capsule Take 20 mg by mouth as needed.      HYDROcodone-acetaminophen (NORCO) 5-325 MG Tab per tablet take 1 tablet by mouth every 8 hours if needed for 7 days      VENTOLIN  (90 Base) MCG/ACT Aero Soln inhalation aerosol Inhale 2 Puffs every four hours as needed.      rosuvastatin (CRESTOR) 20 MG Tab Take 1 Tab by mouth every evening. 90 Tab 3    carvedilol (COREG) 25 MG Tab Take 25 mg by mouth 2 times a day, with meals.      warfarin (COUMADIN) 10 MG Tab Take 5 mg by mouth every evening. 5 mg on Mondays weds and Fridays   7 mg on all other days  MD monitors her warfarin      metformin (GLUCOPHAGE) 1000 MG tablet Take 1,000 mg by mouth 2 times a day.      hydrochlorothiazide (HYDRODIURIL) 25 MG Tab Take 25 mg by mouth 2 Times a Day.      lisinopril (PRINIVIL, ZESTRIL) 40 MG tablet Take 40 mg by mouth every day.      glipiZIDE (GLUCOTROL) 10 MG Tab Take 10 mg by mouth every day.      Coenzyme Q10 (CO Q 10 PO) Take 1 Dose by mouth every day.      fluticasone (FLONASE) 50 MCG/ACT nasal spray Spray 1 Spray in nose 1 time daily as needed. Each Nostril       furosemide (LASIX) 40 MG TABS Take 1 Tab by mouth as needed. 10 Tab 3    magnesium oxide (MAG-OX) 400 MG TABS Take 400 mg by mouth 2 times a day.      [DISCONTINUED] promethazine-dextromethorphan (PROMETHAZINE-DM) 6.25-15 MG/5ML syrup  (Patient not taking: Reported on 2/6/2024)      [DISCONTINUED] triamcinolone acetonide (KENALOG) 0.1 % Cream APPLY TOPICALLY TO  "THE AFFECTED AREA TWICE DAILY FOR 10 DAYS (Patient not taking: Reported on 2/6/2024)      [DISCONTINUED] MOUNJARO 5 MG/0.5ML Solution Pen-injector ADMINISTER 0.5 ML UNDER THE SKIN WEEKLY (Patient not taking: Reported on 2/6/2024)      [DISCONTINUED] LINZESS 145 MCG Cap Take 1 Capsule by mouth every day. (Patient not taking: Reported on 2/6/2024)       No facility-administered encounter medications on file as of 2/6/2024.     Review of Systems   Constitutional:  Negative for fever and malaise/fatigue.   Respiratory:  Negative for cough and shortness of breath.    Cardiovascular:  Positive for leg swelling. Negative for chest pain, palpitations, orthopnea, claudication and PND.   Gastrointestinal:  Negative for abdominal pain.   Musculoskeletal:  Negative for myalgias.   Neurological:  Negative for dizziness.   All other systems reviewed and are negative.       Objective:   /84 (BP Location: Right arm, Patient Position: Sitting, BP Cuff Size: Adult)   Pulse 76   Resp 14   Ht 1.702 m (5' 7\")   Wt 122 kg (269 lb 12.8 oz)   SpO2 98%   BMI 42.26 kg/m²     Physical Exam  Constitutional:       Appearance: She is well-developed. She is morbidly obese.      Comments: BMI 42.26   HENT:      Head: Normocephalic and atraumatic.   Eyes:      Pupils: Pupils are equal, round, and reactive to light.   Neck:      Vascular: No JVD.   Cardiovascular:      Rate and Rhythm: Normal rate and regular rhythm.      Heart sounds: Normal heart sounds.   Pulmonary:      Effort: Pulmonary effort is normal. No respiratory distress.      Breath sounds: Normal breath sounds. No wheezing or rales.   Musculoskeletal:      Cervical back: Normal range of motion and neck supple.      Right lower leg: No edema.      Left lower leg: No edema.   Skin:     General: Skin is warm and dry.   Neurological:      Mental Status: She is alert and oriented to person, place, and time.   Psychiatric:         Behavior: Behavior normal.       Lab Results "   Component Value Date/Time    CHOLSTRLTOT 123 08/15/2023 08:19 AM    CHOLSTRLTOT 143 01/08/2013 12:00 AM    LDL 39 10/31/2019 08:12 AM    LDL 70 01/08/2013 12:00 AM    HDL 48 08/15/2023 08:19 AM    HDL 48 01/08/2013 12:00 AM    TRIGLYCERIDE 93 08/15/2023 08:19 AM    TRIGLYCERIDE 125 01/08/2013 12:00 AM       Lab Results   Component Value Date/Time    SODIUM 136 08/15/2023 08:19 AM    SODIUM 138 04/20/2014 09:02 PM    POTASSIUM 5.2 08/15/2023 08:19 AM    POTASSIUM 3.4 (L) 04/20/2014 09:02 PM    CHLORIDE 101 08/15/2023 08:19 AM    CHLORIDE 101 04/20/2014 09:02 PM    CO2 23 08/15/2023 08:19 AM    CO2 29 04/20/2014 09:02 PM    GLUCOSE 110 (H) 08/15/2023 08:19 AM    GLUCOSE 143 (H) 04/20/2014 09:02 PM    BUN 16 08/15/2023 08:19 AM    BUN 12 04/20/2014 09:02 PM    CREATININE 1.06 (H) 08/15/2023 08:19 AM    CREATININE 1.24 04/20/2014 09:02 PM    CREATININE 1.05 (H) 01/08/2013 12:00 AM    BUNCREATRAT 15 08/15/2023 08:19 AM    BUNCREATRAT 12 01/08/2013 12:00 AM     Lab Results   Component Value Date/Time    ALKPHOSPHAT 80 08/15/2023 08:19 AM    ALKPHOSPHAT 85 01/08/2013 12:00 AM    ASTSGOT 17 08/15/2023 08:19 AM    ASTSGOT 19 01/08/2013 12:00 AM    ALTSGPT 20 08/15/2023 08:19 AM    ALTSGPT 26 01/08/2013 12:00 AM    TBILIRUBIN 0.4 08/15/2023 08:19 AM    TBILIRUBIN 0.5 01/08/2013 12:00 AM     US of left LE 5/21/18 -results reviewed with patient  No left lower extremity deep venous thrombosis    Echo from 1/23/2019 in medical record    Transthoracic Echo [id number]/8/2023  No prior study is available for comparison.   The ejection fraction is measured to be 54 % by Castano's biplane.  Normal right ventricular size and systolic function.  Color Doppler evidence of PFO/ASD, aneurysmal interatrial septum.  No significant valvular disease.        Assessment:     1. Hypertension associated with diabetes ([facility])        2. High risk medication use        3. Dyslipidemia associated with type 2 diabetes mellitus (HCC)        4. MISAEL  (obstructive sleep apnea)        5. Hypercoagulable state (HCC)        6. Morbid obesity with BMI of 40.0-44.9, adult (HCC)        7. History of recurrent deep vein thrombosis (DVT)        8. Bilateral leg edema        9. Tobacco abuse            Medical Decision Making:  Today's Assessment / Status / Plan:   Evidence of PFO/ASD, aneurysmal intra-atrial septum seen on TTE:  -Discussed getting a SIDNEY, but patient declines at this time  -It is reasonable as she also does not have any symptoms  -Information provided for patient to review and if she changes her mind she will let us know.    Hypertension: BP stable  -Continue spironolactone 25 mg daily  -Continue carvedilol 25 mg twice daily  -Continue lisinopril 40 mg daily  -Continue hydrochlorothiazide 25 mg twice a day  -Monitor and log Blood pressures at home. Call office or RTC if BP increasing or >180/100 or if symptoms of elevated blood pressure present. Reviewed s/sx of stroke and heart attack. Patient to go to ER or call 911 if present.     Dyslipidemia: Recent LDL 57 on 8/15/2023  -Continue atorvastatin 20 mg daily  -Encouraged patient on complete smoking cessation    Protein C deficiency, history of DVT:  -Continue warfarin  -followed by PCP in [place]     History of lower extremity edema:  -Patient reports her swelling is stable, she will continue her current regimen    Morbid obesity:  -BMI 42.26  -Recommend weight loss    Tobacco use:  -Discussed smoking cessation, she is trying to quit, some days she does not smoke but will smoke up to 3 cigarettes/day.    Sleep apnea:  -Encourage patient to continue to use CPAP    High Risk Medication Use:  -No labs due at this time.  Recent lab test results reviewed with patient  -Will continue to closely monitor for side effects of patient's high risk medication(s) including renal function, liver function NTproBNP/cardiac markers, and electrolytes as needed     FU in clinic in 6 month.  If patient stable at next  visit, we will have her establish with general cardiology. Sooner if needed.    Patient verbalizes understanding and agrees with the plan of care.     PLEASE NOTE: This Note was created using voice recognition Software. I have made every reasonable attempt to correct obvious errors, but I expect that there are errors of grammar and possibly content that I did not discover before finalizing the note

## 2024-05-24 ENCOUNTER — HOSPITAL ENCOUNTER (OUTPATIENT)
Dept: RADIOLOGY | Facility: MEDICAL CENTER | Age: 64
End: 2024-05-24
Payer: MEDICARE

## 2024-05-24 ENCOUNTER — OFFICE VISIT (OUTPATIENT)
Dept: URGENT CARE | Facility: PHYSICIAN GROUP | Age: 64
End: 2024-05-24
Payer: MEDICARE

## 2024-05-24 VITALS
RESPIRATION RATE: 20 BRPM | OXYGEN SATURATION: 98 % | BODY MASS INDEX: 40.81 KG/M2 | WEIGHT: 260 LBS | TEMPERATURE: 97.4 F | HEART RATE: 68 BPM | HEIGHT: 67 IN | DIASTOLIC BLOOD PRESSURE: 82 MMHG | SYSTOLIC BLOOD PRESSURE: 136 MMHG

## 2024-05-24 DIAGNOSIS — J22 LOWER RESPIRATORY TRACT INFECTION: ICD-10-CM

## 2024-05-24 DIAGNOSIS — F17.200 SMOKES: ICD-10-CM

## 2024-05-24 PROCEDURE — 94640 AIRWAY INHALATION TREATMENT: CPT

## 2024-05-24 PROCEDURE — 3079F DIAST BP 80-89 MM HG: CPT

## 2024-05-24 PROCEDURE — 99204 OFFICE O/P NEW MOD 45 MIN: CPT | Mod: 25

## 2024-05-24 PROCEDURE — 3075F SYST BP GE 130 - 139MM HG: CPT

## 2024-05-24 RX ORDER — IPRATROPIUM BROMIDE AND ALBUTEROL SULFATE 2.5; .5 MG/3ML; MG/3ML
3 SOLUTION RESPIRATORY (INHALATION) ONCE
Status: COMPLETED | OUTPATIENT
Start: 2024-05-24 | End: 2024-05-24

## 2024-05-24 RX ORDER — FLUTICASONE FUROATE AND VILANTEROL 100; 25 UG/1; UG/1
1 POWDER RESPIRATORY (INHALATION) DAILY
Qty: 1 EACH | Refills: 1 | Status: SHIPPED | OUTPATIENT
Start: 2024-05-24 | End: 2024-09-21

## 2024-05-24 RX ORDER — AMOXICILLIN AND CLAVULANATE POTASSIUM 875; 125 MG/1; MG/1
1 TABLET, FILM COATED ORAL 2 TIMES DAILY
Qty: 10 TABLET | Refills: 0 | Status: SHIPPED | OUTPATIENT
Start: 2024-05-24 | End: 2024-05-29

## 2024-05-24 RX ORDER — FLUTICASONE PROPIONATE AND SALMETEROL 100; 50 UG/1; UG/1
1 POWDER RESPIRATORY (INHALATION) EVERY 12 HOURS
Qty: 1 EACH | Refills: 1 | Status: SHIPPED | OUTPATIENT
Start: 2024-05-24 | End: 2024-05-24

## 2024-05-24 RX ORDER — PREDNISONE 20 MG/1
20 TABLET ORAL 2 TIMES DAILY
Qty: 6 TABLET | Refills: 0 | Status: SHIPPED | OUTPATIENT
Start: 2024-05-24 | End: 2024-05-27

## 2024-05-24 RX ORDER — ALBUTEROL SULFATE 90 UG/1
2 AEROSOL, METERED RESPIRATORY (INHALATION) EVERY 6 HOURS PRN
Qty: 8.5 G | Refills: 0 | Status: SHIPPED | OUTPATIENT
Start: 2024-05-24

## 2024-05-24 RX ADMIN — IPRATROPIUM BROMIDE AND ALBUTEROL SULFATE 3 ML: 2.5; .5 SOLUTION RESPIRATORY (INHALATION) at 11:56

## 2024-05-24 ASSESSMENT — ENCOUNTER SYMPTOMS
NAUSEA: 0
HEADACHES: 0
WHEEZING: 1
SHORTNESS OF BREATH: 1
ABDOMINAL PAIN: 0
MYALGIAS: 0
FEVER: 0
VOMITING: 0
SORE THROAT: 1
SINUS PAIN: 1
COUGH: 1
DIZZINESS: 0
CHILLS: 0

## 2024-05-24 NOTE — PROGRESS NOTES
Chief Complaint   Patient presents with    Cough     Congestion ,runny nose ,clogged right ear ,sore throat, sinus pressure ,wheezing , Cough worse at night with mucus that's hard to bring up x 1 week       HISTORY OF PRESENT ILLNESS: Patient is a pleasant 63 y.o. female who presents to urgent care today patient comes in with increased congestion, cough and shortness of breath for the last week.  She does smoke.  Patient denies any fevers.  Notes wheezing, sore throat with sinus pain and pressure as well.    Patient Active Problem List    Diagnosis Date Noted    Morbid obesity with BMI of 40.0-44.9, adult ([facility]) 08/14/2018    History of recurrent deep vein thrombosis (DVT) 05/21/2018    MISAEL (obstructive sleep apnea) 02/15/2018    Dyslipidemia 01/22/2013    Palpitations 12/21/2011    Hypercoagulable state ([facility]) 10/11/2011    Type 2 diabetes mellitus with obesity ([facility]) 10/11/2011    Essential hypertension 10/11/2011    GERD (gastroesophageal reflux disease) 10/11/2011    Edema 10/11/2011       Allergies:Erythromycin    Current Outpatient Medications Ordered in Epic   Medication Sig Dispense Refill    albuterol 108 (90 Base) MCG/ACT Aero Soln inhalation aerosol Inhale 2 Puffs every 6 hours as needed for Shortness of Breath. 8.5 g 0    amoxicillin-clavulanate (AUGMENTIN) 875-125 MG Tab Take 1 Tablet by mouth 2 times a day for 5 days. 10 Tablet 0    predniSONE (DELTASONE) 20 MG Tab Take 1 Tablet by mouth 2 times a day for 3 days. 6 Tablet 0    fluticasone furoate-vilanterol (BREO ELLIPTA) 100-25 MCG/ACT AEROSOL POWDER, BREATH ACTIVATED Inhale 1 Puff every day for 120 days. 1 Each 1    warfarin (COUMADIN) 7.5 MG Tab Take 7.5 mg by mouth every day.      BD PEN NEEDLE ZUNILDA 2ND GEN USE 1 PEN NEEDLE SUBCUTANEOUSLY DAILY      INSULIN GLARGINE 100 UNIT/ML injection PEN ADMINISTER UP TO 24 UNITS UNDER THE SKIN DAILY      LINZESS 290 MCG Cap Take 1 Capsule by mouth every day.      MOUNJARO 7.5 MG/0.5ML Solution Pen-injector INJECT  7.5 MG UNDER THE SKIN ONE DAY A WEEK      ursodiol (ACTIGALL) 300 MG Cap Take 300 mg by mouth 2 times a day.      Insulin Glargine (BASAGLAR TEMPO PEN SC) Inject  under the skin every day.      spironolactone (ALDACTONE) 25 MG Tab Take 1 Tablet by mouth every day. Please complete lab work for further refills. 90 Tablet 3    omeprazole (PRILOSEC) 20 MG delayed-release capsule Take 20 mg by mouth as needed.      VENTOLIN  (90 Base) MCG/ACT Aero Soln inhalation aerosol Inhale 2 Puffs every four hours as needed.      rosuvastatin (CRESTOR) 20 MG Tab Take 1 Tab by mouth every evening. 90 Tab 3    carvedilol (COREG) 25 MG Tab Take 25 mg by mouth 2 times a day, with meals.      warfarin (COUMADIN) 10 MG Tab Take 5 mg by mouth every evening. 5 mg on Mondays weds and Fridays   7 mg on all other days  MD monitors her warfarin      metformin (GLUCOPHAGE) 1000 MG tablet Take 1,000 mg by mouth 2 times a day.      hydrochlorothiazide (HYDRODIURIL) 25 MG Tab Take 25 mg by mouth 2 Times a Day.      lisinopril (PRINIVIL, ZESTRIL) 40 MG tablet Take 40 mg by mouth every day.      glipiZIDE (GLUCOTROL) 10 MG Tab Take 10 mg by mouth every day.      Coenzyme Q10 (CO Q 10 PO) Take 1 Dose by mouth every day.      fluticasone (FLONASE) 50 MCG/ACT nasal spray Spray 1 Spray in nose 1 time daily as needed. Each Nostril       furosemide (LASIX) 40 MG TABS Take 1 Tab by mouth as needed. 10 Tab 3    magnesium oxide (MAG-OX) 400 MG TABS Take 400 mg by mouth 2 times a day.      HYDROcodone-acetaminophen (NORCO) 5-325 MG Tab per tablet take 1 tablet by mouth every 8 hours if needed for 7 days (Patient not taking: Reported on 5/24/2024)       No current [facility]-ordered facility-administered medications on file.       Past Medical History:   Diagnosis Date    Bronchitis     Clotting disorder (HCC)     Diabetes mellitus type 2 in obese 10/11/2011    DVT (deep venous thrombosis) (HCC) 10/11/2011    Edema 10/11/2011    GERD (gastroesophageal reflux  "disease) 10/11/2011    HTN (hypertension) 10/11/2011    Hypercoagulable state (HCC) 10/11/2011    Hyperlipemia 2013    Hypertension     Nasal drainage     Obesity     Palpitations 2011    Protein C deficiency (HCC)     Sickle cell trait (HCC)        Social History     Tobacco Use    Smoking status: Every Day     Current packs/day: 0.25     Average packs/day: 0.3 packs/day for 35.4 years (8.8 ttl pk-yrs)     Types: Cigarettes     Start date:     Smokeless tobacco: Never    Tobacco comments:     3-4 Cigarettes a day   Vaping Use    Vaping status: Never Used   Substance Use Topics    Alcohol use: No    Drug use: No       Family Status   Relation Name Status    Mo   at age 91        lung cancer    Fa   at age 82        CHF    Sis Kyra Alive    Sharmila Sindhu Alive     Family History   Problem Relation Age of Onset    Clotting Disorder Mother     Kidney Disease Mother     Cancer Mother         lung cancer    Hypertension Father     Cancer Father         Pancreatic  cancer    Heart Failure Father     Clotting Disorder Sister         blood clots, protein deficiency    Other Sister         joint problems    Hypertension Sister     Pulmonary Embolism Sister        Review of Systems   Constitutional:  Positive for malaise/fatigue. Negative for chills and fever.   HENT:  Positive for congestion, sinus pain and sore throat.    Respiratory:  Positive for cough, shortness of breath and wheezing.    Cardiovascular:  Negative for chest pain.   Gastrointestinal:  Negative for abdominal pain, nausea and vomiting.   Musculoskeletal:  Negative for myalgias.   Neurological:  Negative for dizziness and headaches.       Exam:  /82   Pulse 68   Temp 36.3 °C (97.4 °F) (Temporal)   Resp 20   Ht 1.702 m (5' 7\")   Wt 118 kg (260 lb)   SpO2 98%   Physical Exam  Vitals reviewed.   Constitutional:       Appearance: Normal appearance. She is not toxic-appearing.   HENT:      Head: Normocephalic.      Right " Ear: Tympanic membrane, ear canal and external ear normal. There is no impacted cerumen.      Left Ear: Tympanic membrane, ear canal and external ear normal. There is no impacted cerumen.      Nose: Congestion and rhinorrhea present. No nasal tenderness or mucosal edema. Rhinorrhea is clear.      Mouth/Throat:      Mouth: Mucous membranes are moist.      Pharynx: Posterior oropharyngeal erythema present. No oropharyngeal exudate.      Tonsils: No tonsillar exudate. 1+ on the right. 1+ on the left.   Eyes:      General:         Right eye: No discharge.         Left eye: No discharge.      Extraocular Movements: Extraocular movements intact.      Conjunctiva/sclera: Conjunctivae normal.      Pupils: Pupils are equal, round, and reactive to light.   Cardiovascular:      Rate and Rhythm: Normal rate and regular rhythm.      Pulses: Normal pulses.      Heart sounds: Normal heart sounds. No murmur heard.  Pulmonary:      Effort: Pulmonary effort is normal. No respiratory distress.      Breath sounds: No stridor. Wheezing present. No rhonchi or rales.      Comments: Positive congested cough  Musculoskeletal:         General: No swelling, tenderness, deformity or signs of injury.      Cervical back: Normal range of motion and neck supple. No tenderness.      Right lower leg: No edema.      Left lower leg: No edema.   Skin:     General: Skin is warm and dry.      Capillary Refill: Capillary refill takes less than 2 seconds.      Findings: No bruising, erythema, lesion or rash.   Neurological:      General: No focal deficit present.      Mental Status: She is alert.      Sensory: No sensory deficit.      Motor: No weakness.      Coordination: Coordination normal.      Gait: Gait normal.   Psychiatric:         Mood and Affect: Mood normal.         Behavior: Behavior normal.         Thought Content: Thought content normal.         Judgment: Judgment normal.             Assessment/Plan:  1. Lower respiratory tract infection  -  DX-CHEST-2 VIEWS; Future  - ipratropium-albuterol (DUONEB) nebulizer solution  - albuterol 108 (90 Base) MCG/ACT Aero Soln inhalation aerosol; Inhale 2 Puffs every 6 hours as needed for Shortness of Breath.  Dispense: 8.5 g; Refill: 0  - amoxicillin-clavulanate (AUGMENTIN) 875-125 MG Tab; Take 1 Tablet by mouth 2 times a day for 5 days.  Dispense: 10 Tablet; Refill: 0  - predniSONE (DELTASONE) 20 MG Tab; Take 1 Tablet by mouth 2 times a day for 3 days.  Dispense: 6 Tablet; Refill: 0  - fluticasone furoate-vilanterol (BREO ELLIPTA) 100-25 MCG/ACT AEROSOL POWDER, BREATH ACTIVATED; Inhale 1 Puff every day for 120 days.  Dispense: 1 Each; Refill: 1    2. Smokes    Based on physical exam along with review of systems I did provide a DuoNeb today here in the office.  Patient was observed for several minutes in an effort to ensure proper treatment course.  Chest x-ray complete, this is negative for anything significant at this time.  Will aggressively treat as patient does smoke, she has bilateral upper lobe wheezes along with a very congested cough.  Medications in the form of Augmentin and prednisone as well as inhalers were sent to the pharmacy.  Reviewed plan of care at length with the patient, she is a type II diabetic therefore only 3 days worth of prednisone was sent.  Encouraged to drink plenty of fluids, take medication with food.  Advised to keep a close eye on herself, should she develop a fever or increasing shortness of breath please be seen in the emergency room. Total time spent with the patient 45 minutes to include, review of chart, charting, assessment, procedure.    Supportive care, differential diagnoses, and indications for immediate follow-up discussed with patient.   Pathogenesis of diagnosis discussed including typical length and natural progression.   Instructed to return to clinic or nearest emergency department for any change in condition, further concerns, or worsening of symptoms.  Patient  states understanding of the plan of care and discharge instructions.  Instructed to make an appointment, for follow up, with primary care provider.    Please note that this dictation was created using voice recognition software. I have made every reasonable attempt to correct obvious errors, but I expect that there are errors of grammar and possibly content that I did not discover before finalizing the note.      Rosangela CAGLE

## 2024-06-13 DIAGNOSIS — J22 LOWER RESPIRATORY TRACT INFECTION: ICD-10-CM

## 2024-07-02 ENCOUNTER — OFFICE VISIT (OUTPATIENT)
Dept: URGENT CARE | Facility: PHYSICIAN GROUP | Age: 64
End: 2024-07-02
Payer: MEDICARE

## 2024-07-02 VITALS
OXYGEN SATURATION: 95 % | DIASTOLIC BLOOD PRESSURE: 74 MMHG | HEART RATE: 70 BPM | SYSTOLIC BLOOD PRESSURE: 130 MMHG | BODY MASS INDEX: 42.35 KG/M2 | HEIGHT: 67 IN | WEIGHT: 269.84 LBS | RESPIRATION RATE: 18 BRPM | TEMPERATURE: 97 F

## 2024-07-02 DIAGNOSIS — B96.89 ACUTE BACTERIAL SINUSITIS: ICD-10-CM

## 2024-07-02 DIAGNOSIS — J01.90 ACUTE BACTERIAL SINUSITIS: ICD-10-CM

## 2024-07-02 PROCEDURE — 3078F DIAST BP <80 MM HG: CPT | Performed by: PHYSICIAN ASSISTANT

## 2024-07-02 PROCEDURE — 3075F SYST BP GE 130 - 139MM HG: CPT | Performed by: PHYSICIAN ASSISTANT

## 2024-07-02 PROCEDURE — 99213 OFFICE O/P EST LOW 20 MIN: CPT | Performed by: PHYSICIAN ASSISTANT

## 2024-07-02 RX ORDER — AMOXICILLIN AND CLAVULANATE POTASSIUM 875; 125 MG/1; MG/1
1 TABLET, FILM COATED ORAL 2 TIMES DAILY
Qty: 14 TABLET | Refills: 0 | Status: SHIPPED | OUTPATIENT
Start: 2024-07-02 | End: 2024-07-09

## 2024-07-02 ASSESSMENT — ENCOUNTER SYMPTOMS
SINUS PAIN: 1
DIAPHORESIS: 0
CHILLS: 0
ABDOMINAL PAIN: 0
WHEEZING: 0
NAUSEA: 0
SORE THROAT: 0
PALPITATIONS: 0
SHORTNESS OF BREATH: 0
FEVER: 0
COUGH: 0
MYALGIAS: 0
SPUTUM PRODUCTION: 0
DIARRHEA: 0
HEADACHES: 1
VOMITING: 0
DIZZINESS: 0

## 2024-08-07 ENCOUNTER — OFFICE VISIT (OUTPATIENT)
Dept: CARDIOLOGY | Facility: MEDICAL CENTER | Age: 64
End: 2024-08-07
Attending: NURSE PRACTITIONER
Payer: MEDICARE

## 2024-08-07 VITALS
HEART RATE: 72 BPM | BODY MASS INDEX: 42.38 KG/M2 | RESPIRATION RATE: 20 BRPM | WEIGHT: 270 LBS | DIASTOLIC BLOOD PRESSURE: 74 MMHG | HEIGHT: 67 IN | OXYGEN SATURATION: 97 % | SYSTOLIC BLOOD PRESSURE: 118 MMHG

## 2024-08-07 DIAGNOSIS — I15.2 HYPERTENSION ASSOCIATED WITH DIABETES (HCC): ICD-10-CM

## 2024-08-07 DIAGNOSIS — E11.59 HYPERTENSION ASSOCIATED WITH DIABETES (HCC): ICD-10-CM

## 2024-08-07 DIAGNOSIS — E66.01 MORBID OBESITY WITH BMI OF 40.0-44.9, ADULT (HCC): ICD-10-CM

## 2024-08-07 DIAGNOSIS — Q21.12 PFO WITH ATRIAL SEPTAL ANEURYSM: ICD-10-CM

## 2024-08-07 DIAGNOSIS — Z79.899 HIGH RISK MEDICATION USE: ICD-10-CM

## 2024-08-07 DIAGNOSIS — Z86.718 HISTORY OF RECURRENT DEEP VEIN THROMBOSIS (DVT): ICD-10-CM

## 2024-08-07 DIAGNOSIS — R60.0 BILATERAL LEG EDEMA: ICD-10-CM

## 2024-08-07 DIAGNOSIS — G47.33 OSA (OBSTRUCTIVE SLEEP APNEA): ICD-10-CM

## 2024-08-07 DIAGNOSIS — E11.69 DYSLIPIDEMIA ASSOCIATED WITH TYPE 2 DIABETES MELLITUS (HCC): ICD-10-CM

## 2024-08-07 DIAGNOSIS — I25.3 PFO WITH ATRIAL SEPTAL ANEURYSM: ICD-10-CM

## 2024-08-07 DIAGNOSIS — D68.59 HYPERCOAGULABLE STATE (HCC): ICD-10-CM

## 2024-08-07 DIAGNOSIS — Z72.0 TOBACCO ABUSE: ICD-10-CM

## 2024-08-07 DIAGNOSIS — E78.5 DYSLIPIDEMIA ASSOCIATED WITH TYPE 2 DIABETES MELLITUS (HCC): ICD-10-CM

## 2024-08-07 PROCEDURE — 99213 OFFICE O/P EST LOW 20 MIN: CPT | Performed by: NURSE PRACTITIONER

## 2024-08-07 PROCEDURE — 3078F DIAST BP <80 MM HG: CPT | Performed by: NURSE PRACTITIONER

## 2024-08-07 PROCEDURE — 99214 OFFICE O/P EST MOD 30 MIN: CPT | Performed by: NURSE PRACTITIONER

## 2024-08-07 PROCEDURE — 3074F SYST BP LT 130 MM HG: CPT | Performed by: NURSE PRACTITIONER

## 2024-08-07 ASSESSMENT — ENCOUNTER SYMPTOMS
FEVER: 0
COUGH: 0
ABDOMINAL PAIN: 0
MYALGIAS: 0
CLAUDICATION: 0
SHORTNESS OF BREATH: 0
PALPITATIONS: 0
DIZZINESS: 0
ORTHOPNEA: 0
PND: 0

## 2024-08-07 NOTE — PROGRESS NOTES
Chief Complaint   Patient presents with    Hypertension     F/V DXC:Hypertension associated with diabetes (HCC)        Edema     F/V DX: Bilateral leg edema           Subjective:   Asha Younger is a 63 y.o. female who presents today for follow-up on her HTN, left lower extremity edema.    Current patient of mine.  She was last seen in clinic on 2/6/2024.  During that visit, no changes were made to her regimen.    She reports she has been doing well, does mention slight lower extremity edema, but feels it is due to the heat.  She denies chest pain, palpitations, orthopnea, PND, shortness of breath or dizziness/lightheadedness.    She states she has been gaining weight because of her insulin she is receiving.    She continues to smoke 3 cigarettes/day and sometimes she does not smoke at all.  She is trying to quit.    Additionally, patient has the following medical problems:    -Clotting disorder: Protein C deficiency, taking warfarin, history of DVT     -Hypertension: Taking carvedilol, lisinopril, hydrochlorothiazide, furosemide    -Hyperlipidemia: Taking atorvastatin 20 mg    -Diabetes: Taking glipizide, metformin and mounjaro, followed by endocrinology    -Sleep apnea: does not tolerating CPAP    -Current smoker: Trying to quit smoking, currently smoking 3 cigarettes/day    Past Medical History:   Diagnosis Date    Bronchitis     Clotting disorder (HCC)     Diabetes mellitus type 2 in obese 10/11/2011    DVT (deep venous thrombosis) (HCC) 10/11/2011    Edema 10/11/2011    GERD (gastroesophageal reflux disease) 10/11/2011    HTN (hypertension) 10/11/2011    Hypercoagulable state (HCC) 10/11/2011    Hyperlipemia 01/22/2013    Hypertension     Nasal drainage     Obesity     Palpitations 12/21/2011    Protein C deficiency (HCC)     Sickle cell trait (HCC)      Past Surgical History:   Procedure Laterality Date    OOPHORECTOMY  1984    RIGHT    LAPAROTOMY      VENTRAL HERNIA REPAIR       Family History   Problem  Relation Age of Onset    Clotting Disorder Mother     Kidney Disease Mother     Cancer Mother         lung cancer    Hypertension Father     Cancer Father         Pancreatic  cancer    Heart Failure Father     Clotting Disorder Sister         blood clots, protein deficiency    Other Sister         joint problems    Hypertension Sister     Pulmonary Embolism Sister      Social History     Socioeconomic History    Marital status: Single     Spouse name: Not on file    Number of children: Not on file    Years of education: Not on file    Highest education level: Not on file   Occupational History    Not on file   Tobacco Use    Smoking status: Every Day     Current packs/day: 0.25     Average packs/day: 0.3 packs/day for 35.6 years (8.9 ttl pk-yrs)     Types: Cigarettes     Start date: 1989    Smokeless tobacco: Never    Tobacco comments:     3-4 Cigarettes a day   Vaping Use    Vaping status: Never Used   Substance and Sexual Activity    Alcohol use: No    Drug use: No    Sexual activity: Not on file   Other Topics Concern    Not on file   Social History Narrative    Not on file     Social Determinants of Health     Financial Resource Strain: Not on file   Food Insecurity: Not on file   Transportation Needs: Not on file   Physical Activity: Not on file   Stress: Not on file   Social Connections: Not on file   Intimate Partner Violence: Not on file   Housing Stability: Not on file     Allergies   Allergen Reactions    Erythromycin Itching and Nausea     Rxn - many years ago     Outpatient Encounter Medications as of 8/7/2024   Medication Sig Dispense Refill    albuterol 108 (90 Base) MCG/ACT Aero Soln inhalation aerosol Inhale 2 Puffs every 6 hours as needed for Shortness of Breath. 8.5 g 0    warfarin (COUMADIN) 7.5 MG Tab Take 7.5 mg by mouth every day.      BD PEN NEEDLE ZUNILDA 2ND GEN USE 1 PEN NEEDLE SUBCUTANEOUSLY DAILY      INSULIN GLARGINE 100 UNIT/ML injection PEN ADMINISTER UP TO 24 UNITS UNDER THE SKIN DAILY       LINZESS 290 MCG Cap Take 1 Capsule by mouth every day.      MOUNJARO 7.5 MG/0.5ML Solution Pen-injector INJECT 7.5 MG UNDER THE SKIN ONE DAY A WEEK      ursodiol (ACTIGALL) 300 MG Cap Take 300 mg by mouth 2 times a day.      Insulin Glargine (BASAGLAR TEMPO PEN SC) Inject  under the skin every day.      spironolactone (ALDACTONE) 25 MG Tab Take 1 Tablet by mouth every day. Please complete lab work for further refills. 90 Tablet 3    omeprazole (PRILOSEC) 20 MG delayed-release capsule Take 20 mg by mouth as needed.      rosuvastatin (CRESTOR) 20 MG Tab Take 1 Tab by mouth every evening. 90 Tab 3    carvedilol (COREG) 25 MG Tab Take 25 mg by mouth 2 times a day, with meals.      warfarin (COUMADIN) 10 MG Tab Take 5 mg by mouth every evening. 5 mg on Mondays weds and Fridays   7 mg on all other days  MD monitors her warfarin      metformin (GLUCOPHAGE) 1000 MG tablet Take 1,000 mg by mouth 2 times a day.      hydrochlorothiazide (HYDRODIURIL) 25 MG Tab Take 25 mg by mouth 2 Times a Day.      lisinopril (PRINIVIL, ZESTRIL) 40 MG tablet Take 40 mg by mouth every day.      glipiZIDE (GLUCOTROL) 10 MG Tab Take 10 mg by mouth every day.      Coenzyme Q10 (CO Q 10 PO) Take 1 Dose by mouth every day.      fluticasone (FLONASE) 50 MCG/ACT nasal spray Spray 1 Spray in nose 1 time daily as needed. Each Nostril       furosemide (LASIX) 40 MG TABS Take 1 Tab by mouth as needed. 10 Tab 3    magnesium oxide (MAG-OX) 400 MG TABS Take 400 mg by mouth 2 times a day.      [DISCONTINUED] fluticasone furoate-vilanterol (BREO ELLIPTA) 100-25 MCG/ACT AEROSOL POWDER, BREATH ACTIVATED Inhale 1 Puff every day for 120 days. (Patient not taking: Reported on 8/7/2024) 1 Each 1    VENTOLIN  (90 Base) MCG/ACT Aero Soln inhalation aerosol Inhale 2 Puffs every four hours as needed. (Patient not taking: Reported on 8/7/2024)      [DISCONTINUED] HYDROcodone-acetaminophen (NORCO) 5-325 MG Tab per tablet take 1 tablet by mouth every 8 hours if  "needed for 7 days (Patient not taking: Reported on 5/24/2024)       No facility-administered encounter medications on file as of 8/7/2024.     Review of Systems   Constitutional:  Negative for fever and malaise/fatigue.   Respiratory:  Negative for cough and shortness of breath.    Cardiovascular:  Positive for leg swelling. Negative for chest pain, palpitations, orthopnea, claudication and PND.   Gastrointestinal:  Negative for abdominal pain.   Musculoskeletal:  Negative for myalgias.   Neurological:  Negative for dizziness.   All other systems reviewed and are negative.       Objective:   /74 (BP Location: Left arm, Patient Position: Sitting, BP Cuff Size: Adult)   Pulse 72   Resp 20   Ht 1.702 m (5' 7\")   Wt 122 kg (270 lb)   SpO2 97%   BMI 42.29 kg/m²     Physical Exam  Constitutional:       Appearance: She is well-developed. She is morbidly obese.      Comments: BMI 42.26   HENT:      Head: Normocephalic and atraumatic.   Eyes:      Pupils: Pupils are equal, round, and reactive to light.   Neck:      Vascular: No JVD.   Cardiovascular:      Rate and Rhythm: Normal rate and regular rhythm.      Heart sounds: Normal heart sounds.   Pulmonary:      Effort: Pulmonary effort is normal. No respiratory distress.      Breath sounds: Normal breath sounds. No wheezing or rales.   Musculoskeletal:      Cervical back: Normal range of motion and neck supple.      Right lower leg: Edema (Trace) present.      Left lower leg: Edema (Trace) present.   Skin:     General: Skin is warm and dry.   Neurological:      Mental Status: She is alert and oriented to person, place, and time.   Psychiatric:         Behavior: Behavior normal.       Lab Results   Component Value Date/Time    CHOLSTRLTOT 123 08/15/2023 08:19 AM    CHOLSTRLTOT 143 01/08/2013 12:00 AM    LDL 39 10/31/2019 08:12 AM    LDL 70 01/08/2013 12:00 AM    HDL 48 08/15/2023 08:19 AM    HDL 48 01/08/2013 12:00 AM    TRIGLYCERIDE 93 08/15/2023 08:19 AM    " TRIGLYCERIDE 125 01/08/2013 12:00 AM       Lab Results   Component Value Date/Time    SODIUM 136 08/15/2023 08:19 AM    SODIUM 138 04/20/2014 09:02 PM    POTASSIUM 5.2 08/15/2023 08:19 AM    POTASSIUM 3.4 (L) 04/20/2014 09:02 PM    CHLORIDE 101 08/15/2023 08:19 AM    CHLORIDE 101 04/20/2014 09:02 PM    CO2 23 08/15/2023 08:19 AM    CO2 29 04/20/2014 09:02 PM    GLUCOSE 110 (H) 08/15/2023 08:19 AM    GLUCOSE 143 (H) 04/20/2014 09:02 PM    BUN 16 08/15/2023 08:19 AM    BUN 12 04/20/2014 09:02 PM    CREATININE 1.06 (H) 08/15/2023 08:19 AM    CREATININE 1.24 04/20/2014 09:02 PM    CREATININE 1.05 (H) 01/08/2013 12:00 AM    BUNCREATRAT 15 08/15/2023 08:19 AM    BUNCREATRAT 12 01/08/2013 12:00 AM     Lab Results   Component Value Date/Time    ALKPHOSPHAT 80 08/15/2023 08:19 AM    ALKPHOSPHAT 85 01/08/2013 12:00 AM    ASTSGOT 17 08/15/2023 08:19 AM    ASTSGOT 19 01/08/2013 12:00 AM    ALTSGPT 20 08/15/2023 08:19 AM    ALTSGPT 26 01/08/2013 12:00 AM    TBILIRUBIN 0.4 08/15/2023 08:19 AM    TBILIRUBIN 0.5 01/08/2013 12:00 AM     US of left LE 5/21/18 -results reviewed with patient  No left lower extremity deep venous thrombosis    Echo from 1/23/2019 in medical record    Transthoracic Echo Alsdin2812/8/2023  No prior study is available for comparison.   The ejection fraction is measured to be 54 % by Castano's biplane.  Normal right ventricular size and systolic function.  Color Doppler evidence of PFO/ASD, aneurysmal interatrial septum.  No significant valvular disease.        Assessment:     1. Dyslipidemia associated with type 2 diabetes mellitus (HCC)  Comp Metabolic Panel    Lipid Profile    CBC WITH DIFFERENTIAL      2. Hypertension associated with diabetes (HCC)  Comp Metabolic Panel    Lipid Profile    CBC WITH DIFFERENTIAL      3. High risk medication use  Comp Metabolic Panel    Lipid Profile    CBC WITH DIFFERENTIAL      4. Hypercoagulable state (HCC)  CBC WITH DIFFERENTIAL      5. Tobacco abuse        6. Bilateral  leg edema        7. Morbid obesity with BMI of 40.0-44.9, adult (HCC)        8. History of recurrent deep vein thrombosis (DVT)        9. MISAEL (obstructive sleep apnea)        10. PFO with atrial septal aneurysm              Medical Decision Making:  Today's Assessment / Status / Plan:   Evidence of PFO/ASD, aneurysmal intra-atrial septum seen on TTE:  -Previously discussed getting a SIDNEY, but patient declined at that time  -It is reasonable as she also does not have any symptoms  -Information provided for patient to review and if she changes her mind she will let us know.    Hypertension: BP stable  -Continue spironolactone 25 mg daily  -Continue carvedilol 25 mg twice daily  -Continue lisinopril 40 mg daily  -Continue hydrochlorothiazide 25 mg twice a day  -Monitor and log Blood pressures at home. Call office or RTC if BP increasing or >180/100 or if symptoms of elevated blood pressure present. Reviewed s/sx of stroke and heart attack. Patient to go to ER or call 911 if present.     Dyslipidemia: Recent LDL 57 on 8/15/2023  -Will repeat CMP and lipid panel this year  -Continue atorvastatin 20 mg daily  -Encouraged patient on complete smoking cessation    Protein C deficiency, history of DVT:  -Continue warfarin  -followed by PCP in [place]     History of lower extremity edema:  -Patient reports her swelling is stable, she will continue her current regimen, she does take her furosemide as needed    Morbid obesity:  -BMI 42.29  -Recommend weight loss  -She mentions recent weight gain due to insulin use, she continues to follow with PCP.  She is also taking Mounjaro    Tobacco use:  -Discussed smoking cessation, she is trying to quit, some days she does not smoke but will smoke up to 3 cigarettes/day.    Sleep apnea:  -Encouraged regular CPAP use, she mentions she does not use regularly recently as she takes off her mask in the middle of the night.    High Risk Medication Use:  -CBC, CMP and lipid panel due at this  time.    -Will continue to closely monitor for side effects of patient's high risk medication(s) including renal function, liver function NTproBNP/cardiac markers, and electrolytes as needed     FU in clinic in 6 month. Sooner if needed.    Patient verbalizes understanding and agrees with the plan of care.     PLEASE NOTE: This Note was created using voice recognition Software. I have made every reasonable attempt to correct obvious errors, but I expect that there are errors of grammar and possibly content that I did not discover before finalizing the note

## 2024-08-29 ENCOUNTER — TELEPHONE (OUTPATIENT)
Dept: CARDIOLOGY | Facility: MEDICAL CENTER | Age: 64
End: 2024-08-29
Payer: MEDICARE

## 2024-08-29 DIAGNOSIS — E87.5 HYPERKALEMIA: ICD-10-CM

## 2024-08-29 NOTE — TELEPHONE ENCOUNTER
----- Message from Nurse Practitioner MOIZ Cotton sent at 8/29/2024  8:55 AM PDT -----  Can you let patient know that her labs are fairly stable except her potassium level is slightly high.  Can you review with her if she is taking any supplemental potassium and have her discontinue and make sure she is not eating too many foods that ar  e high in potassium.  Please have her repeat another BMP in a week. Thanks!

## 2024-08-29 NOTE — TELEPHONE ENCOUNTER
Phone Number Called: [phone]    Call outcome: Spoke to patient regarding message below.    Message: Called to relay results and discuss medications. She was taking potassium supplement when she took furosemide, advised to discontinue. Repeat labs in 1 week. Answered all questions and concerns, appreciative of call.     BMP ordered.     CHRIS: PACO, she was taking potassium with furosemide, she will discontinue. Thanks!

## 2024-09-11 RX ORDER — ALBUTEROL SULFATE 90 UG/1
2 INHALANT RESPIRATORY (INHALATION) EVERY 6 HOURS PRN
Qty: 8.5 G | Refills: 0 | OUTPATIENT
Start: 2024-09-11

## 2024-11-21 ENCOUNTER — OFFICE VISIT (OUTPATIENT)
Dept: URGENT CARE | Facility: PHYSICIAN GROUP | Age: 64
End: 2024-11-21
Payer: MEDICARE

## 2024-11-21 VITALS
DIASTOLIC BLOOD PRESSURE: 78 MMHG | HEART RATE: 86 BPM | SYSTOLIC BLOOD PRESSURE: 138 MMHG | BODY MASS INDEX: 41.97 KG/M2 | RESPIRATION RATE: 18 BRPM | TEMPERATURE: 96.9 F | HEIGHT: 67 IN | WEIGHT: 267.4 LBS | OXYGEN SATURATION: 95 %

## 2024-11-21 DIAGNOSIS — H66.001 NON-RECURRENT ACUTE SUPPURATIVE OTITIS MEDIA OF RIGHT EAR WITHOUT SPONTANEOUS RUPTURE OF TYMPANIC MEMBRANE: ICD-10-CM

## 2024-11-21 DIAGNOSIS — J02.9 SORE THROAT: ICD-10-CM

## 2024-11-21 PROCEDURE — 99213 OFFICE O/P EST LOW 20 MIN: CPT

## 2024-11-21 PROCEDURE — 3078F DIAST BP <80 MM HG: CPT

## 2024-11-21 PROCEDURE — 3075F SYST BP GE 130 - 139MM HG: CPT

## 2024-11-21 RX ORDER — LIDOCAINE HYDROCHLORIDE 20 MG/ML
SOLUTION OROPHARYNGEAL
Qty: 100 ML | Refills: 0 | Status: SHIPPED | OUTPATIENT
Start: 2024-11-21

## 2024-11-21 RX ORDER — DEXAMETHASONE SODIUM PHOSPHATE 10 MG/ML
10 INJECTION INTRAMUSCULAR; INTRAVENOUS ONCE
Status: COMPLETED | OUTPATIENT
Start: 2024-11-21 | End: 2024-11-21

## 2024-11-21 RX ADMIN — DEXAMETHASONE SODIUM PHOSPHATE 10 MG: 10 INJECTION INTRAMUSCULAR; INTRAVENOUS at 09:36

## 2024-11-21 ASSESSMENT — ENCOUNTER SYMPTOMS
HEADACHES: 1
VOMITING: 0
COUGH: 1
NAUSEA: 0
SORE THROAT: 1
SWOLLEN GLANDS: 1
MYALGIAS: 0
DIARRHEA: 0
FEVER: 0
HOARSE VOICE: 1
CHILLS: 0
WHEEZING: 1
SPUTUM PRODUCTION: 0
ABDOMINAL PAIN: 0
NECK PAIN: 0
TROUBLE SWALLOWING: 0
SHORTNESS OF BREATH: 0

## 2024-11-21 ASSESSMENT — FIBROSIS 4 INDEX: FIB4 SCORE: 1.141344117818037522

## 2024-11-21 NOTE — PROGRESS NOTES
"Subjective:   Asha Younger is a 64 y.o. female who presents for Pharyngitis (Sx started about Monday with a severe sore throat, hurts to swallow, wheezing, coughing up congestion, mild head aches, right ear pain, coughing,  )      Pharyngitis   This is a new problem. The current episode started in the past 7 days. The problem has been gradually worsening. Neither side of throat is experiencing more pain than the other. There has been no fever. Associated symptoms include congestion, coughing, ear pain (Right ear), headaches, a hoarse voice and swollen glands. Pertinent negatives include no abdominal pain, diarrhea, drooling, ear discharge, plugged ear sensation, neck pain, shortness of breath, trouble swallowing or vomiting. She has had no exposure to strep or mono.       Review of Systems   Constitutional:  Positive for malaise/fatigue. Negative for chills and fever.   HENT:  Positive for congestion, ear pain (Right ear), hoarse voice and sore throat. Negative for drooling, ear discharge, hearing loss and trouble swallowing.    Respiratory:  Positive for cough and wheezing. Negative for sputum production and shortness of breath.    Cardiovascular:  Negative for chest pain.   Gastrointestinal:  Negative for abdominal pain, diarrhea, nausea and vomiting.   Genitourinary:  Negative for dysuria.   Musculoskeletal:  Negative for myalgias and neck pain.   Skin:  Negative for rash.   Neurological:  Positive for headaches.       Medications, Allergies, and current problem list reviewed today in Epic.     Objective:     /78 (BP Location: Left arm, Patient Position: Sitting, BP Cuff Size: Large adult long)   Pulse 86   Temp 36.1 °C (96.9 °F) (Temporal)   Resp 18   Ht 1.702 m (5' 7\")   Wt 121 kg (267 lb 6.4 oz)   SpO2 95%     Physical Exam  Vitals and nursing note reviewed.   Constitutional:       General: She is not in acute distress.     Appearance: Normal appearance. She is not ill-appearing.   HENT:      " Head: Normocephalic and atraumatic.      Right Ear: Tympanic membrane is erythematous and bulging. Tympanic membrane is not perforated.      Left Ear: Tympanic membrane normal.      Nose: Congestion present.      Right Turbinates: Enlarged.      Left Turbinates: Enlarged.      Mouth/Throat:      Mouth: Mucous membranes are moist.      Pharynx: Posterior oropharyngeal erythema present. No pharyngeal swelling, oropharyngeal exudate, uvula swelling or postnasal drip.      Tonsils: No tonsillar exudate or tonsillar abscesses. 1+ on the right. 1+ on the left.   Eyes:      Conjunctiva/sclera: Conjunctivae normal.   Cardiovascular:      Rate and Rhythm: Normal rate.      Heart sounds: Normal heart sounds.   Pulmonary:      Effort: Pulmonary effort is normal.   Abdominal:      General: Abdomen is flat.      Palpations: Abdomen is soft.   Musculoskeletal:         General: Normal range of motion.      Cervical back: Normal range of motion.   Skin:     General: Skin is warm and dry.      Capillary Refill: Capillary refill takes less than 2 seconds.   Neurological:      Mental Status: She is alert and oriented to person, place, and time.   Psychiatric:         Mood and Affect: Mood normal.         Behavior: Behavior normal.         Assessment/Plan:       1. Sore throat  dexamethasone (Decadron) injection (check route below) 10 mg    lidocaine (XYLOCAINE) 2 % Solution      2. Non-recurrent acute suppurative otitis media of right ear without spontaneous rupture of tympanic membrane  amoxicillin-clavulanate (AUGMENTIN) 875-125 MG Tab        After assessment patient does appear to have acute otitis media of right ear.  Patient reports that she is having a lot of pain with swallowing and had noted erythema with mild tonsillar enlargement..  Patient has associated congestion and cough and some mild wheezing that she has been using her albuterol inhaler with relief.  At this time patient's lung sounds are clear with no notes of  respiratory distress.  Vital signs are all reassuring.  I did provide patient 10 mg of oral dexamethasone in office.  Prescription for viscous lidocaine and Augmentin were also sent to patient pharmacy.  Patient instructed take as prescribed.  Recommend adequate hydration, rest, deep breathing and coughing, ambulation as tolerated, OTC medications.  Patient instructed to monitor for any worsening signs and symptoms of any other concerns patient was instructed to return to urgent care for reevaluation.    Differential diagnosis, natural history, and supportive care discussed. We also reviewed side effects of medication including allergic response, GI upset, tendon injury, rash, sedation etc. Patient and/or guardian voices understanding.      Advised the patient to follow-up with the primary care physician for recheck, reevaluation, and consideration of further management.    I personally reviewed prior external notes and test results pertinent to today's visit as well as additional imaging and testing completed in clinic today.     Please note that this dictation was created using voice recognition software. I have made every reasonable attempt to correct obvious errors, but I expect that there are errors of grammar and possibly content that I did not discover before finalizing the note.    This note was electronically signed by MOIZ Vanessa

## 2024-11-27 ENCOUNTER — OFFICE VISIT (OUTPATIENT)
Dept: URGENT CARE | Facility: PHYSICIAN GROUP | Age: 64
End: 2024-11-27
Payer: MEDICARE

## 2024-11-27 VITALS
TEMPERATURE: 96.8 F | SYSTOLIC BLOOD PRESSURE: 134 MMHG | OXYGEN SATURATION: 98 % | BODY MASS INDEX: 45.58 KG/M2 | HEART RATE: 77 BPM | HEIGHT: 64 IN | RESPIRATION RATE: 19 BRPM | DIASTOLIC BLOOD PRESSURE: 74 MMHG | WEIGHT: 267 LBS

## 2024-11-27 DIAGNOSIS — J01.40 ACUTE NON-RECURRENT PANSINUSITIS: ICD-10-CM

## 2024-11-27 DIAGNOSIS — R06.2 WHEEZING: ICD-10-CM

## 2024-11-27 PROCEDURE — 99213 OFFICE O/P EST LOW 20 MIN: CPT

## 2024-11-27 PROCEDURE — 3075F SYST BP GE 130 - 139MM HG: CPT

## 2024-11-27 PROCEDURE — 94640 AIRWAY INHALATION TREATMENT: CPT

## 2024-11-27 PROCEDURE — 3078F DIAST BP <80 MM HG: CPT

## 2024-11-27 RX ORDER — IPRATROPIUM BROMIDE AND ALBUTEROL SULFATE 2.5; .5 MG/3ML; MG/3ML
3 SOLUTION RESPIRATORY (INHALATION) ONCE
Status: COMPLETED | OUTPATIENT
Start: 2024-11-27 | End: 2024-11-27

## 2024-11-27 RX ORDER — ALBUTEROL SULFATE 90 UG/1
2 INHALANT RESPIRATORY (INHALATION) EVERY 6 HOURS PRN
Qty: 8.5 G | Refills: 0 | Status: SHIPPED | OUTPATIENT
Start: 2024-11-27

## 2024-11-27 RX ORDER — PREDNISONE 20 MG/1
20 TABLET ORAL DAILY
Qty: 3 TABLET | Refills: 0 | Status: SHIPPED | OUTPATIENT
Start: 2024-11-27 | End: 2024-11-30

## 2024-11-27 RX ADMIN — IPRATROPIUM BROMIDE AND ALBUTEROL SULFATE 3 ML: 2.5; .5 SOLUTION RESPIRATORY (INHALATION) at 16:57

## 2024-11-27 ASSESSMENT — VISUAL ACUITY: OU: 1

## 2024-11-27 ASSESSMENT — FIBROSIS 4 INDEX: FIB4 SCORE: 1.141344117818037522

## 2024-11-28 NOTE — PROGRESS NOTES
Subjective     Asha Younger is a 64 y.o. female who presents with cough and sore throat x1 week.     HPI:   Asha is a 65yo female presenting for sore throat and cough x1 week. She was recently seen 11/21/24 and treated for otitis media with Augmentin x7 days. Reports associated wheezing, post-tussive emesis, and sinus pressure. Denies abdominal pain, vomiting, or diarrhea. No fever. Denies dysphagia or difficulty managing oral secretions. No neck pain or swelling.        Review of Systems   Constitutional:  Negative for chills, fever and malaise/fatigue.   HENT:  Positive for congestion, sinus pain and sore throat. Negative for ear discharge and ear pain.    Eyes:  Negative for blurred vision, double vision, photophobia, pain, discharge and redness.   Respiratory:  Positive for cough, sputum production and wheezing. Negative for shortness of breath and stridor.    Gastrointestinal:  Negative for abdominal pain, diarrhea, nausea and vomiting.   Musculoskeletal:  Negative for myalgias and neck pain.   Skin:  Negative for rash.   Neurological:  Negative for dizziness, tingling, sensory change and weakness.     Past Medical History:   Diagnosis Date    Bronchitis     Clotting disorder (Formerly [facility] [place])     Diabetes mellitus type 2 in obese 10/11/2011    DVT (deep venous thrombosis) (Formerly [facility] [place]) 10/11/2011    Edema 10/11/2011    GERD (gastroesophageal reflux disease) 10/11/2011    HTN (hypertension) 10/11/2011    Hypercoagulable state (Formerly [facility] [place]) 10/11/2011    Hyperlipemia 01/22/2013    Hypertension     Nasal drainage     Obesity     Palpitations 12/21/2011    Protein C deficiency (Formerly [facility] [place])     Sickle cell trait (Formerly [facility] [place])      Past Surgical History:   Procedure Laterality Date    OOPHORECTOMY  1984    RIGHT    LAPAROTOMY      VENTRAL HERNIA REPAIR       Allergies: Erythromycin     Current Outpatient Medications:     albuterol 108 (90 Base) MCG/ACT Aero Soln inhalation aerosol, Inhale 2 Puffs every 6 hours as needed for Shortness of Breath., Disp:  8.5 g, Rfl: 0    lidocaine (XYLOCAINE) 2 % Solution, 5mL orally, may be used as a swish and spit up to three times daily as needed for throat pain, Disp: 100 mL, Rfl: 0    warfarin (COUMADIN) 7.5 MG Tab, Take 7.5 mg by mouth every day., Disp: , Rfl:     BD PEN NEEDLE ZUNILDA 2ND GEN, USE 1 PEN NEEDLE SUBCUTANEOUSLY DAILY, Disp: , Rfl:     INSULIN GLARGINE 100 UNIT/ML injection PEN, ADMINISTER UP TO 24 UNITS UNDER THE SKIN DAILY, Disp: , Rfl:     LINZESS 290 MCG Cap, Take 1 Capsule by mouth every day., Disp: , Rfl:     MOUNJARO 7.5 MG/0.5ML Solution Pen-injector, INJECT 7.5 MG UNDER THE SKIN ONE DAY A WEEK, Disp: , Rfl:     ursodiol (ACTIGALL) 300 MG Cap, Take 300 mg by mouth 2 times a day., Disp: , Rfl:     Insulin Glargine (BASAGLAR TEMPO PEN SC), Inject  under the skin every day., Disp: , Rfl:     spironolactone (ALDACTONE) 25 MG Tab, Take 1 Tablet by mouth every day. Please complete lab work for further refills., Disp: 90 Tablet, Rfl: 3    omeprazole (PRILOSEC) 20 MG delayed-release capsule, Take 20 mg by mouth as needed., Disp: , Rfl:     rosuvastatin (CRESTOR) 20 MG Tab, Take 1 Tab by mouth every evening., Disp: 90 Tab, Rfl: 3    carvedilol (COREG) 25 MG Tab, Take 25 mg by mouth 2 times a day, with meals., Disp: , Rfl:     warfarin (COUMADIN) 10 MG Tab, Take 5 mg by mouth every evening. 5 mg on Mondays weds and Fridays  7 mg on all other days MD monitors her warfarin, Disp: , Rfl:     metformin (GLUCOPHAGE) 1000 MG tablet, Take 1,000 mg by mouth 2 times a day., Disp: , Rfl:     hydrochlorothiazide (HYDRODIURIL) 25 MG Tab, Take 25 mg by mouth 2 Times a Day., Disp: , Rfl:     lisinopril (PRINIVIL, ZESTRIL) 40 MG tablet, Take 40 mg by mouth every day., Disp: , Rfl:     glipiZIDE (GLUCOTROL) 10 MG Tab, Take 10 mg by mouth every day., Disp: , Rfl:     Coenzyme Q10 (CO Q 10 PO), Take 1 Dose by mouth every day., Disp: , Rfl:     fluticasone (FLONASE) 50 MCG/ACT nasal spray, Spray 1 Spray in nose 1 time daily as needed.  "Each Nostril , Disp: , Rfl:     furosemide (LASIX) 40 MG TABS, Take 1 Tab by mouth as needed., Disp: 10 Tab, Rfl: 3    magnesium oxide (MAG-OX) 400 MG TABS, Take 400 mg by mouth 2 times a day., Disp: , Rfl:      Social History     Tobacco Use    Smoking status: Every Day     Current packs/day: 0.25     Average packs/day: 0.3 packs/day for 35.9 years (9.0 ttl pk-yrs)     Types: Cigarettes     Start date: 1989    Smokeless tobacco: Never    Tobacco comments:     3-4 Cigarettes a day   Vaping Use    Vaping status: Never Used   Substance Use Topics    Alcohol use: No    Drug use: No     Family History   Problem Relation Age of Onset    Clotting Disorder Mother     Kidney Disease Mother     Cancer Mother         lung cancer    Hypertension Father     Cancer Father         Pancreatic  cancer    Heart Failure Father     Clotting Disorder Sister         blood clots, protein deficiency    Other Sister         joint problems    Hypertension Sister     Pulmonary Embolism Sister      Medications, Allergies, and current problem list reviewed today in Epic.      Objective     /74 (BP Location: Left arm, Patient Position: Sitting, BP Cuff Size: Large adult long)   Pulse 77   Temp 36 °C (96.8 °F) (Temporal)   Resp 19   Ht 1.626 m (5' 4\")   Wt 121 kg (267 lb)   SpO2 98%   BMI 45.83 kg/m²      Physical Exam  Vitals reviewed.   Constitutional:       General: She is not in acute distress.  HENT:      Head: No right periorbital erythema or left periorbital erythema.      Jaw: There is normal jaw occlusion. No tenderness, swelling, pain on movement or malocclusion.      Right Ear: Tympanic membrane, ear canal and external ear normal.      Left Ear: Tympanic membrane, ear canal and external ear normal.      Nose: Congestion present.      Right Turbinates: Enlarged and swollen.      Left Turbinates: Enlarged and swollen.      Right Sinus: Maxillary sinus tenderness present.      Left Sinus: Maxillary sinus tenderness present. "      Mouth/Throat:      Lips: No lesions.      Mouth: Mucous membranes are moist. No oral lesions or angioedema.      Tongue: No lesions. Tongue does not deviate from midline.      Palate: No mass and lesions.      Pharynx: Uvula midline. Posterior oropharyngeal erythema present. No oropharyngeal exudate or uvula swelling.   Eyes:      General: Vision grossly intact. Gaze aligned appropriately. No visual field deficit.     Extraocular Movements: Extraocular movements intact.      Conjunctiva/sclera: Conjunctivae normal.      Pupils: Pupils are equal, round, and reactive to light.      Right eye: Pupil is round, reactive and not sluggish.      Left eye: Pupil is round, reactive and not sluggish.      Funduscopic exam:     Right eye: Red reflex present.         Left eye: Red reflex present.  Neck:      Trachea: Trachea normal. No abnormal tracheal secretions or tracheal deviation.   Cardiovascular:      Rate and Rhythm: Normal rate and regular rhythm.      Pulses: Normal pulses.      Heart sounds: Normal heart sounds.   Pulmonary:      Effort: Pulmonary effort is normal. No tachypnea, accessory muscle usage, prolonged expiration, respiratory distress or retractions.      Breath sounds: No stridor, decreased air movement or transmitted upper airway sounds. Examination of the right-upper field reveals wheezing. Examination of the left-upper field reveals wheezing. Examination of the right-middle field reveals wheezing. Examination of the left-middle field reveals wheezing. Wheezing (improved post DuoNeb treatment) present. No rhonchi or rales.   Musculoskeletal:         General: Normal range of motion.      Cervical back: Full passive range of motion without pain, normal range of motion and neck supple. Tenderness present. No erythema, rigidity or crepitus. No pain with movement. Normal range of motion.   Lymphadenopathy:      Cervical: Cervical adenopathy present.   Skin:     General: Skin is warm and dry.       Findings: No rash.   Neurological:      Mental Status: She is alert. Mental status is at baseline.      Sensory: Sensation is intact.      Coordination: Coordination is intact.      Gait: Gait is intact.   Psychiatric:         Mood and Affect: Mood normal.         Behavior: Behavior normal.         Thought Content: Thought content normal.       Patient declines CXR     Assessment & Plan     1. Wheezing   - ipratropium-albuterol (DUONEB) nebulizer solution  - predniSONE (DELTASONE) 20 MG Tab; Take 1 Tablet by mouth every day for 3 days.  Dispense: 3 Tablet; Refill: 0    2. Acute non-recurrent pansinusitis   - amoxicillin-clavulanate (AUGMENTIN) 875-125 MG Tab; Take 1 Tablet by mouth 2 times a day for 3 days.  Dispense: 6 Tablet; Refill: 0  - albuterol 108 (90 Base) MCG/ACT Aero Soln inhalation aerosol; Inhale 2 Puffs every 6 hours as needed for Shortness of Breath.  Dispense: 8.5 g; Refill: 0       MDM/Comments:   Vital signs stable and patient non-toxic appearing.   Patient presenting for continued sore throat, cough, and wheezing. Recent treatment with Augmentin x7 days for otitis media of right ear. No current evidence of otitis media. Wheezing improved post DuoNeb in clinic. Will treat airway inflammation with short course of Prednisone, and extend treatment with Augmentin for an additional 3 days, this will provide a longer course of therapy for bacterial sinus infection coverage.   Patient declines CXR.   Patient has been warned of side effects of steroids including, hyperglycemia, gastritis/dyspepsia (instructed to take with food), and insomnia. Also instructed to avoid oral NSAID use while taking the medication. Patient instructed to monitor for hyperglycemia.      STRICT emergency room precautions discussed. Patient verbalizes understanding of when to present to emergency room or call 911.       Illness progression and alarm symptoms discussed with patient, emphasizing low threshold for returning to  clinic/emergency department for worsening symptoms. Patient is agreeable to the plan and verbalizes understanding, and will follow up if warranted.       Differential diagnosis, natural history, supportive care, and indications for immediate follow-up discussed.      Follow-up as needed if symptoms worsen or fail to improve to PCP, Urgent care or Emergency Room.                         Electronically signed by GURJIT Galindo

## 2024-12-19 ENCOUNTER — OFFICE VISIT (OUTPATIENT)
Dept: URGENT CARE | Facility: PHYSICIAN GROUP | Age: 64
End: 2024-12-19
Payer: MEDICARE

## 2024-12-19 ENCOUNTER — HOSPITAL ENCOUNTER (OUTPATIENT)
Dept: RADIOLOGY | Facility: MEDICAL CENTER | Age: 64
End: 2024-12-19
Attending: PHYSICIAN ASSISTANT
Payer: MEDICARE

## 2024-12-19 ENCOUNTER — HOSPITAL ENCOUNTER (OUTPATIENT)
Dept: LAB | Facility: MEDICAL CENTER | Age: 64
End: 2024-12-19
Attending: PHYSICIAN ASSISTANT
Payer: MEDICARE

## 2024-12-19 VITALS
RESPIRATION RATE: 18 BRPM | HEART RATE: 68 BPM | OXYGEN SATURATION: 98 % | HEIGHT: 66 IN | SYSTOLIC BLOOD PRESSURE: 128 MMHG | WEIGHT: 256.73 LBS | DIASTOLIC BLOOD PRESSURE: 78 MMHG | TEMPERATURE: 97.3 F | BODY MASS INDEX: 41.26 KG/M2

## 2024-12-19 DIAGNOSIS — R10.13 EPIGASTRIC ABDOMINAL PAIN: ICD-10-CM

## 2024-12-19 LAB
ALBUMIN SERPL BCP-MCNC: 3.8 G/DL (ref 3.2–4.9)
ALBUMIN/GLOB SERPL: 1 G/DL
ALP SERPL-CCNC: 71 U/L (ref 30–99)
ALT SERPL-CCNC: 14 U/L (ref 2–50)
ANION GAP SERPL CALC-SCNC: 9 MMOL/L (ref 7–16)
AST SERPL-CCNC: 17 U/L (ref 12–45)
BASOPHILS # BLD AUTO: 0.6 % (ref 0–1.8)
BASOPHILS # BLD: 0.05 K/UL (ref 0–0.12)
BILIRUB SERPL-MCNC: 0.3 MG/DL (ref 0.1–1.5)
BUN SERPL-MCNC: 9 MG/DL (ref 8–22)
CALCIUM ALBUM COR SERPL-MCNC: 10.5 MG/DL (ref 8.5–10.5)
CALCIUM SERPL-MCNC: 10.3 MG/DL (ref 8.5–10.5)
CHLORIDE SERPL-SCNC: 99 MMOL/L (ref 96–112)
CO2 SERPL-SCNC: 25 MMOL/L (ref 20–33)
CREAT SERPL-MCNC: 1.24 MG/DL (ref 0.5–1.4)
EOSINOPHIL # BLD AUTO: 0.16 K/UL (ref 0–0.51)
EOSINOPHIL NFR BLD: 2.1 % (ref 0–6.9)
ERYTHROCYTE [DISTWIDTH] IN BLOOD BY AUTOMATED COUNT: 39.9 FL (ref 35.9–50)
GFR SERPLBLD CREATININE-BSD FMLA CKD-EPI: 48 ML/MIN/1.73 M 2
GLOBULIN SER CALC-MCNC: 3.7 G/DL (ref 1.9–3.5)
GLUCOSE SERPL-MCNC: 140 MG/DL (ref 65–99)
HCT VFR BLD AUTO: 44.7 % (ref 37–47)
HGB BLD-MCNC: 14.3 G/DL (ref 12–16)
IMM GRANULOCYTES # BLD AUTO: 0.03 K/UL (ref 0–0.11)
IMM GRANULOCYTES NFR BLD AUTO: 0.4 % (ref 0–0.9)
LIPASE SERPL-CCNC: 284 U/L (ref 11–82)
LYMPHOCYTES # BLD AUTO: 2.39 K/UL (ref 1–4.8)
LYMPHOCYTES NFR BLD: 30.9 % (ref 22–41)
MCH RBC QN AUTO: 26.5 PG (ref 27–33)
MCHC RBC AUTO-ENTMCNC: 32 G/DL (ref 32.2–35.5)
MCV RBC AUTO: 82.8 FL (ref 81.4–97.8)
MONOCYTES # BLD AUTO: 0.47 K/UL (ref 0–0.85)
MONOCYTES NFR BLD AUTO: 6.1 % (ref 0–13.4)
NEUTROPHILS # BLD AUTO: 4.64 K/UL (ref 1.82–7.42)
NEUTROPHILS NFR BLD: 59.9 % (ref 44–72)
NRBC # BLD AUTO: 0 K/UL
NRBC BLD-RTO: 0 /100 WBC (ref 0–0.2)
PLATELET # BLD AUTO: 295 K/UL (ref 164–446)
PMV BLD AUTO: 10.2 FL (ref 9–12.9)
POTASSIUM SERPL-SCNC: 5 MMOL/L (ref 3.6–5.5)
PROT SERPL-MCNC: 7.5 G/DL (ref 6–8.2)
RBC # BLD AUTO: 5.4 M/UL (ref 4.2–5.4)
SODIUM SERPL-SCNC: 133 MMOL/L (ref 135–145)
WBC # BLD AUTO: 7.7 K/UL (ref 4.8–10.8)

## 2024-12-19 PROCEDURE — 3078F DIAST BP <80 MM HG: CPT | Performed by: PHYSICIAN ASSISTANT

## 2024-12-19 PROCEDURE — 85025 COMPLETE CBC W/AUTO DIFF WBC: CPT

## 2024-12-19 PROCEDURE — 36415 COLL VENOUS BLD VENIPUNCTURE: CPT

## 2024-12-19 PROCEDURE — 3074F SYST BP LT 130 MM HG: CPT | Performed by: PHYSICIAN ASSISTANT

## 2024-12-19 PROCEDURE — 83690 ASSAY OF LIPASE: CPT

## 2024-12-19 PROCEDURE — 76700 US EXAM ABDOM COMPLETE: CPT

## 2024-12-19 PROCEDURE — 99215 OFFICE O/P EST HI 40 MIN: CPT | Performed by: PHYSICIAN ASSISTANT

## 2024-12-19 PROCEDURE — 80053 COMPREHEN METABOLIC PANEL: CPT

## 2024-12-19 RX ORDER — ONDANSETRON 4 MG/1
4 TABLET, ORALLY DISINTEGRATING ORAL EVERY 6 HOURS PRN
Qty: 10 TABLET | Refills: 0 | Status: SHIPPED | OUTPATIENT
Start: 2024-12-19

## 2024-12-19 ASSESSMENT — ENCOUNTER SYMPTOMS
ABDOMINAL PAIN: 1
EYE DISCHARGE: 0
EYE REDNESS: 0
NAUSEA: 1
BLOOD IN STOOL: 0
VOMITING: 0
CONSTIPATION: 0
DIARRHEA: 0

## 2024-12-19 ASSESSMENT — FIBROSIS 4 INDEX: FIB4 SCORE: 1.141344117818037522

## 2024-12-19 NOTE — PROGRESS NOTES
"Subjective     Asha Younger is a 64 y.o. female who presents with Cough (Stomach issues, congestion, waking up with coughing fits. Eating causes pain and discomfort.)            HPI    This is a new problem.   The patient presents to clinic complaining of intermittent upper abdominal pain x 5-6 days.  The patient states over the past several days she has been experiencing intermittent epigastric abdominal pain/discomfort.  The patient states this pain is worse with eating.  The patient notes intermittent radiation of pain across her upper abdomen to her mid back.  The patient reports no associated fever.  The patient states she has had a slight upset stomach with slight nausea.  She reports no vomiting.  The patient states she has had loose stool which \"falls apart\" into pieces.  The patient states her stool has been lighter in color.  The patient has noted increased gurgling of her abdomen.  The patient has taken OTC antacids for her current symptoms.  The patient states she was prescribed antibiotics and steroids at the end of November for respiratory tract infection.  The patient states she has had an elevated blood glucose since finishing the oral steroids.  The patient reports a history of a previous ovarian cyst removal and ventral hernia repair.  She reports no additional abdominal surgeries.  The patient has a slight lingering cough and congestion, which she attributes to allergies.    PMH:  has a past medical history of Bronchitis, Clotting disorder ([facility]), Diabetes mellitus type 2 in obese (10/11/2011), DVT (deep venous thrombosis) ([facility]) (10/11/2011), Edema (10/11/2011), GERD (gastroesophageal reflux disease) (10/11/2011), HTN (hypertension) (10/11/2011), Hypercoagulable state ([facility]) (10/11/2011), Hyperlipemia (01/22/2013), Hypertension, Nasal drainage, Obesity, Palpitations (12/21/2011), Protein C deficiency ([facility]), and Sickle cell trait ([facility]).  MEDS:   Current Outpatient Medications:     albuterol 108 " (90 Base) MCG/ACT Aero Soln inhalation aerosol, Inhale 2 Puffs every 6 hours as needed for Shortness of Breath., Disp: 8.5 g, Rfl: 0    warfarin (COUMADIN) 7.5 MG Tab, Take 7.5 mg by mouth every day., Disp: , Rfl:     BD PEN NEEDLE ZUNILDA 2ND GEN, USE 1 PEN NEEDLE SUBCUTANEOUSLY DAILY, Disp: , Rfl:     INSULIN GLARGINE 100 UNIT/ML injection PEN, ADMINISTER UP TO 24 UNITS UNDER THE SKIN DAILY, Disp: , Rfl:     LINZESS 290 MCG Cap, Take 1 Capsule by mouth every day., Disp: , Rfl:     ursodiol (ACTIGALL) 300 MG Cap, Take 300 mg by mouth 2 times a day., Disp: , Rfl:     Insulin Glargine (BASAGLAR TEMPO PEN SC), Inject  under the skin every day., Disp: , Rfl:     spironolactone (ALDACTONE) 25 MG Tab, Take 1 Tablet by mouth every day. Please complete lab work for further refills., Disp: 90 Tablet, Rfl: 3    rosuvastatin (CRESTOR) 20 MG Tab, Take 1 Tab by mouth every evening., Disp: 90 Tab, Rfl: 3    carvedilol (COREG) 25 MG Tab, Take 25 mg by mouth 2 times a day, with meals., Disp: , Rfl:     warfarin (COUMADIN) 10 MG Tab, Take 5 mg by mouth every evening. 5 mg on Mondays weds and Fridays  7 mg on all other days MD monitors her warfarin, Disp: , Rfl:     metformin (GLUCOPHAGE) 1000 MG tablet, Take 1,000 mg by mouth 2 times a day., Disp: , Rfl:     hydrochlorothiazide (HYDRODIURIL) 25 MG Tab, Take 25 mg by mouth 2 Times a Day., Disp: , Rfl:     lisinopril (PRINIVIL, ZESTRIL) 40 MG tablet, Take 40 mg by mouth every day., Disp: , Rfl:     glipiZIDE (GLUCOTROL) 10 MG Tab, Take 10 mg by mouth every day., Disp: , Rfl:     fluticasone (FLONASE) 50 MCG/ACT nasal spray, Spray 1 Spray in nose 1 time daily as needed. Each Nostril , Disp: , Rfl:     furosemide (LASIX) 40 MG TABS, Take 1 Tab by mouth as needed., Disp: 10 Tab, Rfl: 3    magnesium oxide (MAG-OX) 400 MG TABS, Take 400 mg by mouth 2 times a day., Disp: , Rfl:     lidocaine (XYLOCAINE) 2 % Solution, 5mL orally, may be used as a swish and spit up to three times daily as  "needed for throat pain (Patient not taking: Reported on 12/19/2024), Disp: 100 mL, Rfl: 0    MOUNJARO 7.5 MG/0.5ML Solution Pen-injector, INJECT 7.5 MG UNDER THE SKIN ONE DAY A WEEK (Patient not taking: Reported on 12/19/2024), Disp: , Rfl:     omeprazole (PRILOSEC) 20 MG delayed-release capsule, Take 20 mg by mouth as needed. (Patient not taking: Reported on 12/19/2024), Disp: , Rfl:     Coenzyme Q10 (CO Q 10 PO), Take 1 Dose by mouth every day. (Patient not taking: Reported on 12/19/2024), Disp: , Rfl:   ALLERGIES:   Allergies   Allergen Reactions    Erythromycin Itching and Nausea     Rxn - many years ago     SURGHX:   Past Surgical History:   Procedure Laterality Date    OOPHORECTOMY  1984    RIGHT    LAPAROTOMY      VENTRAL HERNIA REPAIR       SOCHX:  reports that she has quit smoking. Her smoking use included cigarettes. She started smoking about 35 years ago. She has a 9 pack-year smoking history. She has never used smokeless tobacco. She reports that she does not drink alcohol and does not use drugs.  FH: Family history was reviewed, no pertinent findings to report      Review of Systems   Eyes:  Negative for discharge and redness.   Gastrointestinal:  Positive for abdominal pain and nausea. Negative for blood in stool, constipation, diarrhea and vomiting.   Genitourinary:  Negative for dysuria.              Objective     /78 (BP Location: Left arm, Patient Position: Sitting, BP Cuff Size: Large adult)   Pulse 68   Temp (!) 35.5 °C (95.9 °F) (Temporal)   Resp 18   Ht 1.676 m (5' 6\")   Wt 116 kg (256 lb 11.6 oz)   SpO2 98%   BMI 41.44 kg/m²      Physical Exam  Constitutional:       General: She is not in acute distress.     Appearance: Normal appearance. She is well-developed. She is not ill-appearing.   HENT:      Head: Normocephalic and atraumatic.      Right Ear: External ear normal.      Left Ear: External ear normal.      Nose: Nose normal.   Eyes:      Extraocular Movements: Extraocular " movements intact.      Conjunctiva/sclera: Conjunctivae normal.   Cardiovascular:      Rate and Rhythm: Normal rate and regular rhythm.      Heart sounds: Normal heart sounds.   Pulmonary:      Effort: Pulmonary effort is normal. No respiratory distress.      Breath sounds: Normal breath sounds. No wheezing.   Abdominal:      General: Bowel sounds are normal.      Palpations: Abdomen is soft.      Tenderness: There is abdominal tenderness in the epigastric area. There is no right CVA tenderness, left CVA tenderness, guarding or rebound.   Musculoskeletal:         General: Normal range of motion.      Cervical back: Normal range of motion and neck supple.   Skin:     General: Skin is warm and dry.   Neurological:      Mental Status: She is alert and oriented to person, place, and time.               Progress:  GI Cocktail given in clinic    CBC:      Component  Ref Range & Units 1 d ago   WBC  4.8 - 10.8 K/uL 7.7   RBC  4.20 - 5.40 M/uL 5.40   Hemoglobin  12.0 - 16.0 g/dL 14.3   Hematocrit  37.0 - 47.0 % 44.7   MCV  81.4 - 97.8 fL 82.8   MCH  27.0 - 33.0 pg 26.5 Low    MCHC  32.2 - 35.5 g/dL 32.0 Low    RDW  35.9 - 50.0 fL 39.9   Platelet Count  164 - 446 K/uL 295   MPV  9.0 - 12.9 fL 10.2   Neutrophils-Polys  44.00 - 72.00 % 59.90   Lymphocytes  22.00 - 41.00 % 30.90   Monocytes  0.00 - 13.40 % 6.10   Eosinophils  0.00 - 6.90 % 2.10   Basophils  0.00 - 1.80 % 0.60   Immature Granulocytes  0.00 - 0.90 % 0.40   Nucleated RBC  0.00 - 0.20 /100 WBC 0.00   Neutrophils (Absolute)  1.82 - 7.42 K/uL 4.64   Comment: Includes immature neutrophils, if present.   Lymphs (Absolute)  1.00 - 4.80 K/uL 2.39   Monos (Absolute)  0.00 - 0.85 K/uL 0.47   Eos (Absolute)  0.00 - 0.51 K/uL 0.16   Baso (Absolute)  0.00 - 0.12 K/uL 0.05   Immature Granulocytes (abs)  0.00 - 0.11 K/uL 0.03   NRBC (Absolute)  K/uL 0.00   Immature Cells    Comments-Diff    Hypochromia           CMP:      Component  Ref Range & Units 1 d ago  (12/19/24)    Sodium  135 - 145 mmol/L 133 Low    Potassium  3.6 - 5.5 mmol/L 5.0   Chloride  96 - 112 mmol/L 99   Co2  20 - 33 mmol/L 25   Anion Gap  7.0 - 16.0 9.0   Glucose  65 - 99 mg/dL 140 High    Bun  8 - 22 mg/dL 9   Creatinine  0.50 - 1.40 mg/dL 1.24   Calcium  8.5 - 10.5 mg/dL 10.3   Correct Calcium  8.5 - 10.5 mg/dL 10.5   AST(SGOT)  12 - 45 U/L 17   ALT(SGPT)  2 - 50 U/L 14   Alkaline Phosphatase  30 - 99 U/L 71   Total Bilirubin  0.1 - 1.5 mg/dL 0.3   Albumin  3.2 - 4.9 g/dL 3.8   Total Protein  6.0 - 8.2 g/dL 7.5   Globulin  1.9 - 3.5 g/dL 3.7 High    A-G Ratio  g/dL 1.0          Lipase:  Component  Ref Range & Units 1 d ago   Lipase  11 - 82 U/L 284 High      eGFR:  Component  Ref Range & Units 1 d ago   GFR (CKD-EPI)  >60 mL/min/1.73 m 2 48 Abnormal        Abdominal US :  COMPARISON: None     FINDINGS:  The liver is normal in contour. There is no evidence of solid mass lesion. The liver measures 18.58 cm. There is hepatomegaly. Heterogeneous echogenicity of the liver, most compatible with diffuse hepatocellular disease.     The gallbladder is normal. There is no evidence of cholelithiasis. The gallbladder wall thickness measures 0.30 cm.  There is no pericholecystic fluid.     The common duct measures 0.38 cm.     The visualized pancreas is unremarkable.  The visualized aorta is normal in caliber.     Intrahepatic IVC is patent.     The portal vein is patent with hepatopetal flow. The MPV measures 1.12 cm.     The right kidney measures 11.82 cm.  The left kidney measures 10.87 cm.  Uniform increased echogenicity of both kidneys, compatible with medical renal disease. No hydronephrosis.     The spleen measures 7.97 cm maximally.     The bladder demonstrates no focal wall abnormality.     There is no ascites.     IMPRESSION:  1. Diffuse hepatocellular disease and hepatomegaly.  2. Bilateral medical renal disease.  3. The gallbladder is within normal limits.           Assessment & Plan        Assessment &  Plan  Epigastric abdominal pain    Orders:    CBC WITH DIFFERENTIAL; Future    Comp Metabolic Panel; Future    LIPASE; Future    US-ABDOMEN COMPLETE SURVEY; Future    ondansetron (ZOFRAN ODT) 4 MG TABLET DISPERSIBLE; Take 1 Tablet by mouth every 6 hours as needed for Nausea/Vomiting.            The patient's presenting symptoms and physical exam findings are consistent with epigastric abdominal pain.  The patient has been experiencing intermittent upper abdominal pain, which is worse with eating.  The patient states she has also had loose stools, which are lighter in color.  On physical exam, the patient had epigastric abdominal tenderness without guarding or rebound.  The patient's bowel sounds were within normal limits.  The patient is nontoxic and appears in no acute distress.  The patient's vital signs are stable and within normal limits.  She is afebrile today in clinic.  The patient was given a GI cocktail in clinic for her current symptoms.  The cause the patient's current symptoms is unknown at this time.  Will order a CBC, CMP, and lipase to further evaluate the patient's epigastric abdominal pain.  Will also order the patient an abdominal ultrasound to further evaluate her current symptoms.  The patient's abdominal ultrasound showed diffuse hepatocellular disease and hepatomegaly with bilateral medical renal disease.  The gallbladder was within normal limits.  The patient's CBC was within normal limits with a normal WBC at 7.7.  The patient's CMP showed a slightly low sodium 133.  The patient's glucose was elevated at 140.  The patient's creatinine was found to be 1.24.  The patient's AST, ALT, and alkaline phosphatase were within normal limits.  The patient's EGFR was 48.  Lastly, the patient's lipase was found to be significantly elevated at 284.  Based on the patient's presenting symptoms, physical exam findings, and lab results, I am concerned that the patient's epigastric abdominal pain may be related  to acute pancreatitis.  Given these findings, I believe the patient would benefit from further evaluation and a higher level of care.  Therefore, I recommend the patient be transferred to the [facility] for further evaluation and management.  The patient agrees with this plan.  The patient is stable for transfer via private vehicle at this time.  Advised the patient this is risk not seeking further care for her current symptoms, and she verbalized understanding.    Plan:  Transfer patient to the [facility] for further evaluation and management.    Please note that this dictation was created using voice recognition software. I have made every reasonable attempt to correct obvious errors, but I expect that there may be errors of grammar and possibly content that I did not discover before finalizing the note.     This note was electronically signed by Irasema Loza PA-C

## 2024-12-20 ENCOUNTER — HOSPITAL ENCOUNTER (INPATIENT)
Facility: MEDICAL CENTER | Age: 64
LOS: 2 days | DRG: 439 | End: 2024-12-23
Attending: EMERGENCY MEDICINE | Admitting: HOSPITALIST
Payer: MEDICARE

## 2024-12-20 DIAGNOSIS — R73.9 HYPERGLYCEMIA: ICD-10-CM

## 2024-12-20 DIAGNOSIS — G47.33 OSA (OBSTRUCTIVE SLEEP APNEA): Chronic | ICD-10-CM

## 2024-12-20 DIAGNOSIS — K85.90 ACUTE PANCREATITIS, UNSPECIFIED COMPLICATION STATUS, UNSPECIFIED PANCREATITIS TYPE: ICD-10-CM

## 2024-12-20 PROBLEM — Z86.718 HISTORY OF DEEP VEIN THROMBOSIS: Status: ACTIVE | Noted: 2024-12-20

## 2024-12-20 PROBLEM — D68.59 PROTEIN C DEFICIENCY (HCC): Status: ACTIVE | Noted: 2024-12-20

## 2024-12-20 PROBLEM — E86.0 DEHYDRATION: Status: ACTIVE | Noted: 2024-12-20

## 2024-12-20 PROBLEM — E87.1 HYPONATREMIA: Status: ACTIVE | Noted: 2024-12-20

## 2024-12-20 PROBLEM — N17.9 ACUTE KIDNEY INJURY (HCC): Status: ACTIVE | Noted: 2024-12-20

## 2024-12-20 LAB
ALBUMIN SERPL BCP-MCNC: 3.9 G/DL (ref 3.2–4.9)
ALBUMIN/GLOB SERPL: 1.1 G/DL
ALP SERPL-CCNC: 75 U/L (ref 30–99)
ALT SERPL-CCNC: 12 U/L (ref 2–50)
ANION GAP SERPL CALC-SCNC: 9 MMOL/L (ref 7–16)
AST SERPL-CCNC: 13 U/L (ref 12–45)
BASOPHILS # BLD AUTO: 0.6 % (ref 0–1.8)
BASOPHILS # BLD: 0.04 K/UL (ref 0–0.12)
BILIRUB SERPL-MCNC: 0.3 MG/DL (ref 0.1–1.5)
BUN SERPL-MCNC: 13 MG/DL (ref 8–22)
CALCIUM ALBUM COR SERPL-MCNC: 9.9 MG/DL (ref 8.5–10.5)
CALCIUM SERPL-MCNC: 9.8 MG/DL (ref 8.4–10.2)
CHLORIDE SERPL-SCNC: 98 MMOL/L (ref 96–112)
CO2 SERPL-SCNC: 25 MMOL/L (ref 20–33)
CREAT SERPL-MCNC: 1.28 MG/DL (ref 0.5–1.4)
EOSINOPHIL # BLD AUTO: 0.12 K/UL (ref 0–0.51)
EOSINOPHIL NFR BLD: 1.8 % (ref 0–6.9)
ERYTHROCYTE [DISTWIDTH] IN BLOOD BY AUTOMATED COUNT: 38.8 FL (ref 35.9–50)
GFR SERPLBLD CREATININE-BSD FMLA CKD-EPI: 46 ML/MIN/1.73 M 2
GLOBULIN SER CALC-MCNC: 3.5 G/DL (ref 1.9–3.5)
GLUCOSE BLD STRIP.AUTO-MCNC: 102 MG/DL (ref 65–99)
GLUCOSE BLD STRIP.AUTO-MCNC: 176 MG/DL (ref 65–99)
GLUCOSE SERPL-MCNC: 121 MG/DL (ref 65–99)
HCT VFR BLD AUTO: 44.6 % (ref 37–47)
HGB BLD-MCNC: 14.4 G/DL (ref 12–16)
IMM GRANULOCYTES # BLD AUTO: 0.02 K/UL (ref 0–0.11)
IMM GRANULOCYTES NFR BLD AUTO: 0.3 % (ref 0–0.9)
INR PPP: 1.47 (ref 0.87–1.13)
LIPASE SERPL-CCNC: 390 U/L (ref 11–82)
LYMPHOCYTES # BLD AUTO: 2.04 K/UL (ref 1–4.8)
LYMPHOCYTES NFR BLD: 29.9 % (ref 22–41)
MCH RBC QN AUTO: 26.5 PG (ref 27–33)
MCHC RBC AUTO-ENTMCNC: 32.3 G/DL (ref 32.2–35.5)
MCV RBC AUTO: 82.1 FL (ref 81.4–97.8)
MONOCYTES # BLD AUTO: 0.33 K/UL (ref 0–0.85)
MONOCYTES NFR BLD AUTO: 4.8 % (ref 0–13.4)
NEUTROPHILS # BLD AUTO: 4.27 K/UL (ref 1.82–7.42)
NEUTROPHILS NFR BLD: 62.6 % (ref 44–72)
NRBC # BLD AUTO: 0 K/UL
NRBC BLD-RTO: 0 /100 WBC (ref 0–0.2)
PLATELET # BLD AUTO: 278 K/UL (ref 164–446)
PMV BLD AUTO: 9.2 FL (ref 9–12.9)
POTASSIUM SERPL-SCNC: 5.2 MMOL/L (ref 3.6–5.5)
PROT SERPL-MCNC: 7.4 G/DL (ref 6–8.2)
PROTHROMBIN TIME: 18.2 SEC (ref 12–14.6)
RBC # BLD AUTO: 5.43 M/UL (ref 4.2–5.4)
SODIUM SERPL-SCNC: 132 MMOL/L (ref 135–145)
WBC # BLD AUTO: 6.8 K/UL (ref 4.8–10.8)

## 2024-12-20 PROCEDURE — 99285 EMERGENCY DEPT VISIT HI MDM: CPT

## 2024-12-20 PROCEDURE — 85610 PROTHROMBIN TIME: CPT

## 2024-12-20 PROCEDURE — 80053 COMPREHEN METABOLIC PANEL: CPT

## 2024-12-20 PROCEDURE — 96375 TX/PRO/DX INJ NEW DRUG ADDON: CPT

## 2024-12-20 PROCEDURE — 36415 COLL VENOUS BLD VENIPUNCTURE: CPT

## 2024-12-20 PROCEDURE — 83690 ASSAY OF LIPASE: CPT

## 2024-12-20 PROCEDURE — G0378 HOSPITAL OBSERVATION PER HR: HCPCS

## 2024-12-20 PROCEDURE — 700111 HCHG RX REV CODE 636 W/ 250 OVERRIDE (IP): Performed by: EMERGENCY MEDICINE

## 2024-12-20 PROCEDURE — 96372 THER/PROPH/DIAG INJ SC/IM: CPT

## 2024-12-20 PROCEDURE — 94760 N-INVAS EAR/PLS OXIMETRY 1: CPT

## 2024-12-20 PROCEDURE — 82962 GLUCOSE BLOOD TEST: CPT

## 2024-12-20 PROCEDURE — 99223 1ST HOSP IP/OBS HIGH 75: CPT | Performed by: HOSPITALIST

## 2024-12-20 PROCEDURE — 700102 HCHG RX REV CODE 250 W/ 637 OVERRIDE(OP): Performed by: HOSPITALIST

## 2024-12-20 PROCEDURE — 96374 THER/PROPH/DIAG INJ IV PUSH: CPT

## 2024-12-20 PROCEDURE — 700105 HCHG RX REV CODE 258: Performed by: EMERGENCY MEDICINE

## 2024-12-20 PROCEDURE — 85025 COMPLETE CBC W/AUTO DIFF WBC: CPT

## 2024-12-20 PROCEDURE — A9270 NON-COVERED ITEM OR SERVICE: HCPCS | Performed by: HOSPITALIST

## 2024-12-20 RX ORDER — HYDROMORPHONE HYDROCHLORIDE 1 MG/ML
0.5 INJECTION, SOLUTION INTRAMUSCULAR; INTRAVENOUS; SUBCUTANEOUS
Status: DISCONTINUED | OUTPATIENT
Start: 2024-12-20 | End: 2024-12-23 | Stop reason: HOSPADM

## 2024-12-20 RX ORDER — OXYCODONE HYDROCHLORIDE 10 MG/1
10 TABLET ORAL
Status: DISCONTINUED | OUTPATIENT
Start: 2024-12-20 | End: 2024-12-23 | Stop reason: HOSPADM

## 2024-12-20 RX ORDER — CARVEDILOL 25 MG/1
25 TABLET ORAL 2 TIMES DAILY WITH MEALS
Status: DISCONTINUED | OUTPATIENT
Start: 2024-12-20 | End: 2024-12-23 | Stop reason: HOSPADM

## 2024-12-20 RX ORDER — ONDANSETRON 2 MG/ML
4 INJECTION INTRAMUSCULAR; INTRAVENOUS ONCE
Status: COMPLETED | OUTPATIENT
Start: 2024-12-20 | End: 2024-12-20

## 2024-12-20 RX ORDER — OXYCODONE HYDROCHLORIDE 5 MG/1
5 TABLET ORAL
Status: DISCONTINUED | OUTPATIENT
Start: 2024-12-20 | End: 2024-12-23 | Stop reason: HOSPADM

## 2024-12-20 RX ORDER — HYDROMORPHONE HYDROCHLORIDE 1 MG/ML
0.25 INJECTION, SOLUTION INTRAMUSCULAR; INTRAVENOUS; SUBCUTANEOUS
Status: DISCONTINUED | OUTPATIENT
Start: 2024-12-20 | End: 2024-12-20

## 2024-12-20 RX ORDER — DEXTROSE MONOHYDRATE 25 G/50ML
25 INJECTION, SOLUTION INTRAVENOUS
Status: DISCONTINUED | OUTPATIENT
Start: 2024-12-20 | End: 2024-12-23 | Stop reason: HOSPADM

## 2024-12-20 RX ORDER — WARFARIN SODIUM 5 MG/1
5 TABLET ORAL
COMMUNITY
Start: 2024-10-08

## 2024-12-20 RX ORDER — LISINOPRIL 20 MG/1
40 TABLET ORAL DAILY
Status: DISCONTINUED | OUTPATIENT
Start: 2024-12-21 | End: 2024-12-23 | Stop reason: HOSPADM

## 2024-12-20 RX ORDER — INSULIN LISPRO 100 [IU]/ML
1-6 INJECTION, SOLUTION INTRAVENOUS; SUBCUTANEOUS
Status: DISCONTINUED | OUTPATIENT
Start: 2024-12-20 | End: 2024-12-23 | Stop reason: HOSPADM

## 2024-12-20 RX ORDER — AMOXICILLIN 250 MG
2 CAPSULE ORAL EVERY EVENING
Status: DISCONTINUED | OUTPATIENT
Start: 2024-12-20 | End: 2024-12-23 | Stop reason: HOSPADM

## 2024-12-20 RX ORDER — WARFARIN SODIUM 5 MG/1
5 TABLET ORAL
Status: DISCONTINUED | OUTPATIENT
Start: 2024-12-20 | End: 2024-12-23

## 2024-12-20 RX ORDER — SPIRONOLACTONE 25 MG/1
25 TABLET ORAL DAILY
Status: DISCONTINUED | OUTPATIENT
Start: 2024-12-21 | End: 2024-12-23 | Stop reason: HOSPADM

## 2024-12-20 RX ORDER — POLYETHYLENE GLYCOL 3350 17 G/17G
1 POWDER, FOR SOLUTION ORAL
Status: DISCONTINUED | OUTPATIENT
Start: 2024-12-20 | End: 2024-12-23 | Stop reason: HOSPADM

## 2024-12-20 RX ORDER — HYDROCHLOROTHIAZIDE 25 MG/1
25 TABLET ORAL 2 TIMES DAILY
Status: DISCONTINUED | OUTPATIENT
Start: 2024-12-21 | End: 2024-12-23 | Stop reason: HOSPADM

## 2024-12-20 RX ORDER — ALBUTEROL SULFATE 90 UG/1
2 INHALANT RESPIRATORY (INHALATION) EVERY 6 HOURS PRN
Status: DISCONTINUED | OUTPATIENT
Start: 2024-12-20 | End: 2024-12-23 | Stop reason: HOSPADM

## 2024-12-20 RX ORDER — ROSUVASTATIN CALCIUM 10 MG/1
20 TABLET, COATED ORAL EVERY EVENING
Status: DISCONTINUED | OUTPATIENT
Start: 2024-12-20 | End: 2024-12-23 | Stop reason: HOSPADM

## 2024-12-20 RX ORDER — ONDANSETRON 4 MG/1
4 TABLET, ORALLY DISINTEGRATING ORAL EVERY 4 HOURS PRN
Status: DISCONTINUED | OUTPATIENT
Start: 2024-12-20 | End: 2024-12-23 | Stop reason: HOSPADM

## 2024-12-20 RX ORDER — URSODIOL 300 MG/1
300 CAPSULE ORAL 2 TIMES DAILY
Status: DISCONTINUED | OUTPATIENT
Start: 2024-12-20 | End: 2024-12-23 | Stop reason: HOSPADM

## 2024-12-20 RX ORDER — SODIUM CHLORIDE 9 MG/ML
INJECTION, SOLUTION INTRAVENOUS CONTINUOUS
Status: DISCONTINUED | OUTPATIENT
Start: 2024-12-20 | End: 2024-12-22

## 2024-12-20 RX ORDER — WARFARIN SODIUM 7.5 MG/1
7.5 TABLET ORAL
Status: DISCONTINUED | OUTPATIENT
Start: 2024-12-21 | End: 2024-12-23

## 2024-12-20 RX ORDER — ACETAMINOPHEN 325 MG/1
650 TABLET ORAL EVERY 6 HOURS PRN
Status: DISCONTINUED | OUTPATIENT
Start: 2024-12-20 | End: 2024-12-23 | Stop reason: HOSPADM

## 2024-12-20 RX ORDER — ONDANSETRON 2 MG/ML
4 INJECTION INTRAMUSCULAR; INTRAVENOUS EVERY 4 HOURS PRN
Status: DISCONTINUED | OUTPATIENT
Start: 2024-12-20 | End: 2024-12-23 | Stop reason: HOSPADM

## 2024-12-20 RX ADMIN — HYDROMORPHONE HYDROCHLORIDE 0.25 MG: 1 INJECTION, SOLUTION INTRAMUSCULAR; INTRAVENOUS; SUBCUTANEOUS at 12:05

## 2024-12-20 RX ADMIN — ONDANSETRON 4 MG: 2 INJECTION INTRAMUSCULAR; INTRAVENOUS at 12:06

## 2024-12-20 RX ADMIN — URSODIOL 300 MG: 300 CAPSULE ORAL at 17:47

## 2024-12-20 RX ADMIN — WARFARIN SODIUM 5 MG: 5 TABLET ORAL at 17:46

## 2024-12-20 RX ADMIN — OXYCODONE HYDROCHLORIDE 10 MG: 10 TABLET ORAL at 16:15

## 2024-12-20 RX ADMIN — SODIUM CHLORIDE: 9 INJECTION, SOLUTION INTRAVENOUS at 12:30

## 2024-12-20 RX ADMIN — INSULIN LISPRO 1 UNITS: 100 INJECTION, SOLUTION INTRAVENOUS; SUBCUTANEOUS at 20:38

## 2024-12-20 RX ADMIN — ROSUVASTATIN CALCIUM 20 MG: 10 TABLET, FILM COATED ORAL at 17:50

## 2024-12-20 SDOH — ECONOMIC STABILITY: TRANSPORTATION INSECURITY
IN THE PAST 12 MONTHS, HAS LACK OF RELIABLE TRANSPORTATION KEPT YOU FROM MEDICAL APPOINTMENTS, MEETINGS, WORK OR FROM GETTING THINGS NEEDED FOR DAILY LIVING?: NO

## 2024-12-20 SDOH — ECONOMIC STABILITY: TRANSPORTATION INSECURITY
IN THE PAST 12 MONTHS, HAS THE LACK OF TRANSPORTATION KEPT YOU FROM MEDICAL APPOINTMENTS OR FROM GETTING MEDICATIONS?: NO

## 2024-12-20 ASSESSMENT — SOCIAL DETERMINANTS OF HEALTH (SDOH)

## 2024-12-20 ASSESSMENT — ENCOUNTER SYMPTOMS
FOCAL WEAKNESS: 0
CHILLS: 0
SHORTNESS OF BREATH: 0
NAUSEA: 1
NERVOUS/ANXIOUS: 0
FEVER: 0
EYE REDNESS: 0
BRUISES/BLEEDS EASILY: 0
COUGH: 0
ROS GI COMMENTS: ANOREXIA
EYE DISCHARGE: 0
MYALGIAS: 0
ABDOMINAL PAIN: 1
FLANK PAIN: 0
STRIDOR: 0

## 2024-12-20 ASSESSMENT — LIFESTYLE VARIABLES
TOTAL SCORE: 0
HAVE YOU EVER FELT YOU SHOULD CUT DOWN ON YOUR DRINKING: NO
EVER HAD A DRINK FIRST THING IN THE MORNING TO STEADY YOUR NERVES TO GET RID OF A HANGOVER: NO
ALCOHOL_USE: NO
HAVE PEOPLE ANNOYED YOU BY CRITICIZING YOUR DRINKING: NO
EVER FELT BAD OR GUILTY ABOUT YOUR DRINKING: NO
TOTAL SCORE: 0
HOW MANY TIMES IN THE PAST YEAR HAVE YOU HAD 5 OR MORE DRINKS IN A DAY: 0
TOTAL SCORE: 0
AVERAGE NUMBER OF DAYS PER WEEK YOU HAVE A DRINK CONTAINING ALCOHOL: 0
CONSUMPTION TOTAL: NEGATIVE
ON A TYPICAL DAY WHEN YOU DRINK ALCOHOL HOW MANY DRINKS DO YOU HAVE: 0

## 2024-12-20 ASSESSMENT — COGNITIVE AND FUNCTIONAL STATUS - GENERAL
DAILY ACTIVITIY SCORE: 24
MOBILITY SCORE: 24
SUGGESTED CMS G CODE MODIFIER MOBILITY: CH
SUGGESTED CMS G CODE MODIFIER DAILY ACTIVITY: CH

## 2024-12-20 ASSESSMENT — PATIENT HEALTH QUESTIONNAIRE - PHQ9
SUM OF ALL RESPONSES TO PHQ9 QUESTIONS 1 AND 2: 0
2. FEELING DOWN, DEPRESSED, IRRITABLE, OR HOPELESS: NOT AT ALL
1. LITTLE INTEREST OR PLEASURE IN DOING THINGS: NOT AT ALL

## 2024-12-20 ASSESSMENT — FIBROSIS 4 INDEX
FIB4 SCORE: 0.88
FIB4 SCORE: 1

## 2024-12-20 ASSESSMENT — PAIN DESCRIPTION - PAIN TYPE
TYPE: ACUTE PAIN

## 2024-12-20 NOTE — H&P
Hospital Medicine History & Physical Note    Date of Service  12/20/2024    Primary Care Physician  CLOVER Zuniga    Consultants  None     Code Status  Full Code    Chief Complaint  Chief Complaint   Patient presents with    Sent from Urgent Care     Sent here from  whom she saw yesterday  had labs done and was told her pancreases is abnormal and kidney functions is off     Abdominal Pain     Started the last 5-6 days     Nausea     Some nausea      Loss of Appetite     History of Presenting Illness  Asha Younger is a 64 y.o. female with a past medical history of protein C deficiency with recurrent deep vein thrombosis on anticoagulation with Coumadin, obesity with a BMI of 40, hyperlipidemia, diabetes who presented 12/20/2024 with abdominal pain.  Patient has not been feeling well over the past 1 week.  She has been having vomiting and worsening abdominal pain nausea and anorexia.  The pain is mostly located in the epigastric region.  She was seen at urgent care and was advised to go to the hospital for acute pancreatitis.     I discussed the plan of care with emergency physician, and the patient    Review of Systems  Review of Systems   Constitutional:  Positive for malaise/fatigue. Negative for chills and fever.   Eyes:  Negative for discharge and redness.   Respiratory:  Negative for cough, shortness of breath and stridor.    Cardiovascular:  Negative for chest pain and leg swelling.   Gastrointestinal:  Positive for abdominal pain and nausea.        Anorexia   Genitourinary:  Negative for flank pain.   Musculoskeletal:  Negative for myalgias.   Skin: Negative.    Neurological:  Negative for focal weakness.   Endo/Heme/Allergies:  Does not bruise/bleed easily.   Psychiatric/Behavioral:  The patient is not nervous/anxious.      Past Medical History   has a past medical history of Bronchitis, Clotting disorder ([facility]), Diabetes mellitus type 2 in obese (10/11/2011), DVT (deep venous thrombosis) ([facility])  (10/11/2011), Edema (10/11/2011), GERD (gastroesophageal reflux disease) (10/11/2011), HTN (hypertension) (10/11/2011), Hypercoagulable state (HCC) (10/11/2011), Hyperlipemia (01/22/2013), Hypertension, Nasal drainage, Obesity, Palpitations (12/21/2011), Protein C deficiency (HCC), and Sickle cell trait (HCC).    Surgical History   has a past surgical history that includes oophorectomy (1984); ventral hernia repair; and laparotomy.     Family History  family history includes Cancer in her father and mother; Clotting Disorder in her mother and sister; Heart Failure in her father; Hypertension in her father and sister; Kidney Disease in her mother; Other in her sister; Pulmonary Embolism in her sister.      Social History   reports that she has been smoking cigarettes. She started smoking about 35 years ago. She has a 9 pack-year smoking history. She has never used smokeless tobacco. She reports that she does not drink alcohol and does not use drugs.    Allergies  Allergies   Allergen Reactions    Erythromycin Itching and Nausea     Rxn - many years ago     Medications  Prior to Admission Medications   Prescriptions Last Dose Informant Patient Reported? Taking?   BD PEN NEEDLE ZUNILDA 2ND GEN Unknown Patient Yes No   Sig: USE 1 PEN NEEDLE SUBCUTANEOUSLY DAILY   Insulin Glargine (BASAGLAR TEMPO PEN SC) 12/19/2024 Morning Patient Yes Yes   Sig: Inject 24-26 Units under the skin every morning.   LINZESS 290 MCG Cap 12/19/2024 Morning Patient Yes Yes   Sig: Take 1 Capsule by mouth every day.   Magnesium Oxide 250 MG Tab 12/20/2024 at  9:30 AM Patient Yes Yes   Sig: Take 250 mg by mouth 2 times a day.   albuterol 108 (90 Base) MCG/ACT Aero Soln inhalation aerosol 12/18/2024 Patient No No   Sig: Inhale 2 Puffs every 6 hours as needed for Shortness of Breath.   carvedilol (COREG) 25 MG Tab 12/20/2024 at  9:30 AM Patient Yes Yes   Sig: Take 25 mg by mouth 2 times a day, with meals.   fluticasone (FLONASE) 50 MCG/ACT nasal spray  12/19/2024 Evening Patient Yes Yes   Sig: Spray 1 Spray in nose 1 time daily as needed. Each Nostril    glipiZIDE (GLUCOTROL) 10 MG Tab 12/20/2024 at  9:30 AM Patient Yes Yes   Sig: Take 10 mg by mouth 3 times a day.   hydrochlorothiazide (HYDRODIURIL) 25 MG Tab 12/20/2024 at  9:30 AM Patient Yes Yes   Sig: Take 25 mg by mouth 2 Times a Day.   lisinopril (PRINIVIL, ZESTRIL) 40 MG tablet 12/20/2024 at  9:30 AM Patient Yes Yes   Sig: Take 40 mg by mouth every day.   metformin (GLUCOPHAGE) 1000 MG tablet 12/20/2024 at  9:30 AM Patient Yes Yes   Sig: Take 1,000 mg by mouth 2 times a day.   ondansetron (ZOFRAN ODT) 4 MG TABLET DISPERSIBLE Unknown Patient No No   Sig: Take 1 Tablet by mouth every 6 hours as needed for Nausea/Vomiting.   rosuvastatin (CRESTOR) 20 MG Tab 12/19/2024 Evening Patient No Yes   Sig: Take 1 Tab by mouth every evening.   spironolactone (ALDACTONE) 25 MG Tab 12/19/2024 Morning Patient No Yes   Sig: Take 1 Tablet by mouth every day. Please complete lab work for further refills.   ursodiol (ACTIGALL) 300 MG Cap 12/20/2024 at  9:30 AM Patient Yes Yes   Sig: Take 300 mg by mouth 2 times a day.   warfarin (COUMADIN) 5 MG Tab 12/18/2024 Evening Patient Yes Yes   Sig: Take 5 mg by mouth 3 times a week. Monday, Wednesday & Friday   warfarin (COUMADIN) 7.5 MG Tab 12/19/2024 Evening Patient Yes Yes   Sig: Take 7.5 mg by mouth every evening. On Tuesday, Thursday, Saturday & Sunday      Facility-Administered Medications: None     Physical Exam  Temp:  [36 °C (96.8 °F)] 36 °C (96.8 °F)  Pulse:  [65] 65  Resp:  [18] 18  BP: (114)/(78) 114/78  SpO2:  [92 %] 92 %  Blood Pressure : 114/78   Temperature: 36 °C (96.8 °F)   Pulse: 65   Respiration: 18   Pulse Oximetry: 92 %     Physical Exam  Constitutional:       General: She is not in acute distress.  HENT:      Head: Normocephalic and atraumatic.      Right Ear: External ear normal.      Left Ear: External ear normal.      Nose: No congestion or rhinorrhea.       "Mouth/Throat:      Mouth: Mucous membranes are moist.      Pharynx: No oropharyngeal exudate or posterior oropharyngeal erythema.   Eyes:      General: No scleral icterus.        Right eye: No discharge.         Left eye: No discharge.      Conjunctiva/sclera: Conjunctivae normal.      Pupils: Pupils are equal, round, and reactive to light.   Cardiovascular:      Rate and Rhythm: Normal rate and regular rhythm.      Heart sounds:      No friction rub. No gallop.   Pulmonary:      Effort: Pulmonary effort is normal.   Abdominal:      General: Abdomen is flat. There is no distension.      Tenderness: There is no guarding.   Musculoskeletal:         General: No swelling.      Cervical back: Neck supple. No rigidity. No muscular tenderness.      Right lower leg: No edema.      Left lower leg: No edema.   Skin:     Capillary Refill: Capillary refill takes 2 to 3 seconds.      Coloration: Skin is not jaundiced or pale.      Findings: No bruising or erythema.   Neurological:      Mental Status: She is alert and oriented to person, place, and time.   Psychiatric:         Mood and Affect: Mood normal.         Judgment: Judgment normal.       Laboratory:  Recent Labs     12/19/24  1357 12/20/24  1151   WBC 7.7 6.8   RBC 5.40 5.43*   HEMOGLOBIN 14.3 14.4   HEMATOCRIT 44.7 44.6   MCV 82.8 82.1   MCH 26.5* 26.5*   MCHC 32.0* 32.3   RDW 39.9 38.8   PLATELETCT 295 278   MPV 10.2 9.2     Recent Labs     12/19/24  1357 12/20/24  1151   SODIUM 133* 132*   POTASSIUM 5.0 5.2   CHLORIDE 99 98   CO2 25 25   GLUCOSE 140* 121*   BUN 9 13   CREATININE 1.24 1.28   CALCIUM 10.3 9.8     Recent Labs     12/19/24  1357 12/20/24  1151   ALTSGPT 14 12   ASTSGOT 17 13   ALKPHOSPHAT 71 75   TBILIRUBIN 0.3 0.3   LIPASE 284* 390*   GLUCOSE 140* 121*     Recent Labs     12/20/24  1403   INR 1.47*     No results for input(s): \"NTPROBNP\" in the last 72 hours.      No results for input(s): \"TROPONINT\" in the last 72 hours.    Imaging:  No orders to " display     Assessment/Plan:  Justification for Admission Status  I anticipate this patient is appropriate for observation status at this time because the patient has abdominal pain secondary to acute pancreatitis leading to dehydration and acute kidney injury.  Likely discharge after midnight.    Patient will need a Med/Surg bed on medical service     * Acute pancreatitis, unspecified complication status, unspecified pancreatitis type- (present on admission)  Assessment & Plan  AST, ALT, alkaline phosphatase and bilirubin within normal limits unlikely biliary etiology.  I will check a lipid profile, consider insulin drip/plasmapheresis if there is significant hypertriglyceridemia.  Supportive care, multimodal pain control.  Bowel rest.  Clear liquid diet for now, consider advancing diet when pain improves   Watch input and output closely, watch respiratory status closely  Watch closely for potential complications including pleural effusion, hypocalcemia, acute renal failure, compartment syndrome     Acute kidney injury (HCC)- (present on admission)  Assessment & Plan  Mostly due to dehydration   I will start intravenous fluids  I will hold home spironolactone, hydrochlorothiazide and spironolactone for today with plan to restart tomorrow as long as dehydration has resolved   Avoid / minimize nephrotoxins as much as possible.  Monitor inputs and outputs   I will order a UA, & urine electrolytes    Hyponatremia- (present on admission)  Assessment & Plan  Hypovolemic hyponatremia   I expect Na to improve with IVF     Dehydration- (present on admission)  Assessment & Plan  Likely secondary to reduced oral intake, and possibly early third spacing secondary to acute pancreatitis  I will hold home hydrochlorothiazide and spironolactone for today with plan to restart tomorrow as long as dehydration has resolved  Encourage oral intake as tolerated, antiemetics as needed.  Intravenous hydration until adequate oral intake is  achieved.     Protein C deficiency (HCC)- (present on admission)  Assessment & Plan  I resume home Coumadin    Morbid obesity with BMI of 40.0-44.9, adult (HCC)- (present on admission)  Assessment & Plan  At higher risk for atelectasis/hypoxia.  I will order continuous pulse oximetry  Emphasized incentive spirometry       History of recurrent deep vein thrombosis (DVT)- (present on admission)  Assessment & Plan  I resume home Coumadin     Essential hypertension- (present on admission)  Assessment & Plan  I resume home carvedilol with hold parameters  I will hold home hydrochlorothiazide and spironolactone for today with plan to restart tomorrow as long as dehydration has resolved     Type 2 diabetes mellitus with obesity (HCC)- (present on admission)  Assessment & Plan  With no significant hyperglycemia  I will check a glycated hemoglobin    I will start long & short acting insulin for now  I will order Accu-Checks, hypoglycemia protocol  Adjust according to blood sugars trend     Dyslipidemia- (present on admission)  Assessment & Plan  Cardiac diet when able to take orally.  I will resume home rosuvastatin    GERD (gastroesophageal reflux disease)- (present on admission)  Assessment & Plan  I will start Tums as needed      VTE prophylaxis: SCDs/TEDs and therapeutic anticoagulation with Coumadin

## 2024-12-20 NOTE — ASSESSMENT & PLAN NOTE
With no significant hyperglycemia  I will check a glycated hemoglobin    I will start long & short acting insulin for now  I will order Accu-Checks, hypoglycemia protocol  Adjust according to blood sugars trend

## 2024-12-20 NOTE — ASSESSMENT & PLAN NOTE
AST, ALT, alkaline phosphatase and bilirubin within normal limits unlikely biliary etiology.  I will check a lipid profile, consider insulin drip/plasmapheresis if there is significant hypertriglyceridemia.  Supportive care, multimodal pain control.  Bowel rest.  Watch input and output closely, watch respiratory status closely  Watch closely for potential complications including pleural effusion, hypocalcemia, acute renal failure, compartment syndrome     12/22: Cotinue IVF, monitor closely.  Advance diet as tolerated slowly.

## 2024-12-20 NOTE — ED NOTES
Pharmacy Medication Reconciliation      ~Medication reconciliation updated and complete per patient at bedside with medication list. Reviewed list with patient and returned at bedside   ~Allergies have been verified and updated   ~No oral ABX within the last 30 days  ~Patient home pharmacy :  [facility]      ~Anticoagulants (rivaroxaban, apixaban, edoxaban, dabigatran, warfarin, enoxaparin) taken in the last 14 days? Yes   ~Anticoagulant: Coumadin, Last dose: 12/19/2024

## 2024-12-20 NOTE — ED TRIAGE NOTES
"/78   Pulse 65   Temp 36 °C (96.8 °F) (Temporal)   Resp 18   Ht 1.702 m (5' 7\")   Wt 118 kg (259 lb 7.7 oz)   SpO2 92%   BMI 40.64 kg/m²   Chief Complaint   Patient presents with    Sent from Urgent Care     Sent here from  whom she saw yesterday  had labs done and was told her pancreases is abnormal and kidney functions is off     Abdominal Pain     Started the last 5-6 days     Nausea     Some nausea      Loss of Appetite     Comes in w/ sister  pt has glucometer  on her monitor   "

## 2024-12-20 NOTE — ASSESSMENT & PLAN NOTE
Likely secondary to reduced oral intake, and possibly early third spacing secondary to acute pancreatitis  I will hold home hydrochlorothiazide and spironolactone for today with plan to restart tomorrow as long as dehydration has resolved  Encourage oral intake as tolerated, antiemetics as needed.  Intravenous hydration until adequate oral intake is achieved.

## 2024-12-20 NOTE — ED PROVIDER NOTES
ED Provider Note    CHIEF COMPLAINT  Chief Complaint   Patient presents with    Sent from Urgent Care     Sent here from  whom she saw yesterday  had labs done and was told her pancreases is abnormal and kidney functions is off     Abdominal Pain     Started the last 5-6 days     Nausea     Some nausea      Loss of Appetite       EXTERNAL RECORDS REVIEWED  Outpatient Notes   and Outpatient labs & studies patient was recently treated for an ear infection and sinusitis with Augmentin, also was noted to be wheezing and treated with ipratropium and prednisone, yesterday she was seen for abdominal pain and had laboratories which showed a worsening of her GFR, as well as elevation of her lipase.  She had an outpatient ultrasound which showed medical renal disease, hepatocellular disease, and a gallbladder in normal limits.    HPI/ROS  LIMITATION TO HISTORY   Select: : None      Asha Younger is a 64 y.o. female who presents sent here from the urgent care after discussion with the PA who saw her yesterday for abdominal pain and abnormal labs.  The patient recently had what she describes as pneumonia for which she was treated with antibiotics, and steroids.  That threw her sugars out of whack and she had a hard time getting under control since then.  She has been having abdominal pain for about a week, hurts all the time but particular bad when she tries to eat something.  For this reason she had diminished appetite.  She has had some intermittent loose stools.  She did have a small bowel movement today which she does not describe is diarrheal.  She has not had a fever since that pneumonia.  There is been some nausea but no vomiting.  She has had a persisting cough but it is improving from initially.  She is not a drinker.  There is no other complaint.    PAST MEDICAL HISTORY   has a past medical history of Bronchitis, Clotting disorder (HCC), Diabetes mellitus type 2 in obese (10/11/2011), DVT (deep venous  thrombosis) (HCC) (10/11/2011), Edema (10/11/2011), GERD (gastroesophageal reflux disease) (10/11/2011), HTN (hypertension) (10/11/2011), Hypercoagulable state (HCC) (10/11/2011), Hyperlipemia (01/22/2013), Hypertension, Nasal drainage, Obesity, Palpitations (12/21/2011), Protein C deficiency ([facility]), and Sickle cell trait ([facility]).    SURGICAL HISTORY   has a past surgical history that includes oophorectomy (1984); ventral hernia repair; and laparotomy.    FAMILY HISTORY  Family History   Problem Relation Age of Onset    Clotting Disorder Mother     Kidney Disease Mother     Cancer Mother         lung cancer    Hypertension Father     Cancer Father         Pancreatic  cancer    Heart Failure Father     Clotting Disorder Sister         blood clots, protein deficiency    Other Sister         joint problems    Hypertension Sister     Pulmonary Embolism Sister        SOCIAL HISTORY  Social History     Tobacco Use    Smoking status: Some Days     Current packs/day: 0.25     Average packs/day: 0.3 packs/day for 36.0 years (9.0 ttl pk-yrs)     Types: Cigarettes     Start date: 1989    Smokeless tobacco: Never    Tobacco comments:     3-4 Cigarettes a day, trying to quit   Vaping Use    Vaping status: Never Used   Substance and Sexual Activity    Alcohol use: No    Drug use: No    Sexual activity: Not on file       CURRENT MEDICATIONS  Home Medications       Reviewed by Nissa Del Rosario R.N. (Registered Nurse) on 12/20/24 at 1054  Med List Status: Partial     Medication Last Dose Status   albuterol 108 (90 Base) MCG/ACT Aero Soln inhalation aerosol  Active   BD PEN NEEDLE ZUNILDA 2ND GEN  Active   carvedilol (COREG) 25 MG Tab  Active   Coenzyme Q10 (CO Q 10 PO)  Active   fluticasone (FLONASE) 50 MCG/ACT nasal spray  Active   furosemide (LASIX) 40 MG TABS  Active   glipiZIDE (GLUCOTROL) 10 MG Tab  Active   hydrochlorothiazide (HYDRODIURIL) 25 MG Tab  Active   Insulin Glargine (BASAGLAR TEMPO PEN SC)  Active   INSULIN GLARGINE 100  "UNIT/ML injection PEN  Active   lidocaine (XYLOCAINE) 2 % Solution  Active   LINZESS 290 MCG Cap  Active   lisinopril (PRINIVIL, ZESTRIL) 40 MG tablet  Active   magnesium oxide (MAG-OX) 400 MG TABS  Active   metformin (GLUCOPHAGE) 1000 MG tablet  Active   MOUNJARO 7.5 MG/0.5ML Solution Pen-injector  Active   omeprazole (PRILOSEC) 20 MG delayed-release capsule  Active   ondansetron (ZOFRAN ODT) 4 MG TABLET DISPERSIBLE  Active   rosuvastatin (CRESTOR) 20 MG Tab  Active   spironolactone (ALDACTONE) 25 MG Tab  Active   ursodiol (ACTIGALL) 300 MG Cap  Active   warfarin (COUMADIN) 10 MG Tab  Active   warfarin (COUMADIN) 7.5 MG Tab  Active                    ALLERGIES  Allergies   Allergen Reactions    Erythromycin Itching and Nausea     Rxn - many years ago       PHYSICAL EXAM  VITAL SIGNS: /78   Pulse 65   Temp 36 °C (96.8 °F) (Temporal)   Resp 18   Ht 1.702 m (5' 7\")   Wt 118 kg (259 lb 7.7 oz)   SpO2 92%   BMI 40.64 kg/m²    Constitutional: Well appearing patient in no acute distress.  HENT: Head is without trauma.  Oropharynx is clear.  Mucous membranes are moist.  Eyes: Sclerae are nonicteric, pupils are equally round.  Neck: Supple with grossly normal range of motion.  Cardiovascular: Heart is regular rate and rhythm without murmur rub or gallop.  Peripheral pulses are intact and symmetric throughout.  Thorax & Lungs: Breathing easily.  Good air movement.  There is no wheeze, rhonchi or rales.  Abdomen: Bowel sounds normal, soft,, nondistended with mild epigastric tenderness to palpation.  There is no rebound or guarding however.  There is no mass or hepatosplenomegaly.  There is no Cortez sign.  Skin: No apparent rash.  I do not see petechiae or purpura.  Extremities: No evidence of acute trauma.    Neurologic: Alert. Moving all extremities.  Intact sensation and strength throughout.  Gait is normal.  Psychiatric: Normal for situation      EKG/LABS  Labs Reviewed   CBC WITH DIFFERENTIAL - Abnormal; " Notable for the following components:       Result Value    RBC 5.43 (*)     MCH 26.5 (*)     All other components within normal limits   COMP METABOLIC PANEL - Abnormal; Notable for the following components:    Sodium 132 (*)     Glucose 121 (*)     All other components within normal limits   LIPASE - Abnormal; Notable for the following components:    Lipase 390 (*)     All other components within normal limits   ESTIMATED GFR - Abnormal; Notable for the following components:    GFR (CKD-EPI) 46 (*)     All other components within normal limits   PROTHROMBIN TIME         COURSE & MEDICAL DECISION MAKING    ASSESSMENT, COURSE AND PLAN  Care Narrative: Patient presents with midepigastric abdominal pain which is postprandial worsening.  Recent course of antibiotics as well as steroids in the setting of diabetes.  Ultrasound yesterday shows a grossly normal gallbladder.  She is not a drinker.  I like to repeat her laboratories, give her some IV fluids and pain and nausea medicine while we work her up, so this is ordered.      Laboratories have returned, she has no leukocytosis, no shift.  She does have persistent hyperglycemia with a glucose of 121.  Her liver function tests are normal.  Her GFR continues to worsen and is now 46.  As well her lipase continues to worsen it is now 390.    Upon recheck after the medication, she is feeling improved.  She saw some mild tenderness but this is improved from when I saw her initially.  We talked about her lab test.  We talked about disposition, home versus hospital.  With shared decision making, we think it be worthwhile to put her in the hospital for bowel rest, and parenteral medication.  I do note that she has had worsening of her laboratories since yesterday, and this adds strength to the idea of staying in hospital.  The case was discussed with Dr. Steiner., hospitalist.  He will put the patient in the hospital.  The patient and her sister were updated of all this, and  she is admitted.    I did discuss the patient's case as well with the pharmacist.  Augmentin is considered as a precipitant, however felt to be less likely.    DISPOSITION AND DISCUSSIONS  I have discussed management of the patient with the following physicians and JOSE J's: Hospitalist    Discussion of management with other QHP or appropriate source(s): Pharmacy        Escalation of care considered, and ultimately not performed:diagnostic imaging given her unremarkable gallbladder ultrasound yesterday did not feel we need to repeat imaging today emergently    FINAL DIAGNOSIS  1. Acute pancreatitis, unspecified complication status, unspecified pancreatitis type    2. Hyperglycemia    3.  Acute renal sufficiency     Electronically signed by: Ja Queen M.D., 12/20/2024 11:29 AM

## 2024-12-20 NOTE — PROGRESS NOTES
Inpatient Anticoagulation Service Note for 12/20/2024      Reason for Anticoagulation: Deep Vein Thrombosis           Hemoglobin Value: 14.4  Hematocrit Value: 44.6  Lab Platelet Value: 278  Target INR: 2.0 to 3.0    INR from last 7 days        Date/Time INR Value    12/20/24 1403 1.47                   Dose from last 7 days        Date/Time Dose (mg)    12/20/24 1517 5                   Bridge Therapy: No     Reversal Agent Administered: Not Applicable    Patient is on warfarin for history of DVT and protein C deficiency. Prior to hospital arrival patient was taking warfarin 5 mg Mon, Wed, Fri and 7.5 mg on all other days. Current INR is 1.47 subtherapeutic per goal targeting 2-3. Started on antibiotics which may increase sensitivity to warfarin. H/H stable no overt s/sx of bleeding.     Plan: Continue warfarin home dose and adjust according to INR results. INR in AM.       Pharmacist suggested discharge dosing: Consider follow up INR in 48 to 72 hours after patient discharges from the hospital.       HALEIGH Francisco, MaicolD

## 2024-12-20 NOTE — ASSESSMENT & PLAN NOTE
Mostly due to dehydration   Avoid / minimize nephrotoxins as much as possible.  Monitor inputs and outputs   I will order a UA, & urine electrolytes    12/22: Cr today is 1.11. Continue IVF

## 2024-12-21 PROBLEM — K85.90 ACUTE PANCREATITIS WITHOUT INFECTION OR NECROSIS: Status: ACTIVE | Noted: 2024-12-21

## 2024-12-21 PROBLEM — Z71.6 TOBACCO ABUSE COUNSELING: Status: ACTIVE | Noted: 2024-12-21

## 2024-12-21 PROBLEM — Z71.89 ADVANCE CARE PLANNING: Status: ACTIVE | Noted: 2024-12-21

## 2024-12-21 PROBLEM — E83.42 HYPOMAGNESEMIA: Status: ACTIVE | Noted: 2024-12-21

## 2024-12-21 LAB
ALBUMIN SERPL BCP-MCNC: 3 G/DL (ref 3.2–4.9)
ALBUMIN/GLOB SERPL: 0.9 G/DL
ALP SERPL-CCNC: 62 U/L (ref 30–99)
ALT SERPL-CCNC: 13 U/L (ref 2–50)
ANION GAP SERPL CALC-SCNC: 8 MMOL/L (ref 7–16)
AST SERPL-CCNC: 13 U/L (ref 12–45)
BILIRUB SERPL-MCNC: 0.2 MG/DL (ref 0.1–1.5)
BUN SERPL-MCNC: 16 MG/DL (ref 8–22)
CALCIUM ALBUM COR SERPL-MCNC: 10 MG/DL (ref 8.5–10.5)
CALCIUM SERPL-MCNC: 9.2 MG/DL (ref 8.4–10.2)
CHLORIDE SERPL-SCNC: 101 MMOL/L (ref 96–112)
CHOLEST SERPL-MCNC: 85 MG/DL (ref 100–199)
CO2 SERPL-SCNC: 22 MMOL/L (ref 20–33)
CREAT SERPL-MCNC: 1.26 MG/DL (ref 0.5–1.4)
ERYTHROCYTE [DISTWIDTH] IN BLOOD BY AUTOMATED COUNT: 40.7 FL (ref 35.9–50)
EST. AVERAGE GLUCOSE BLD GHB EST-MCNC: 220 MG/DL
GFR SERPLBLD CREATININE-BSD FMLA CKD-EPI: 47 ML/MIN/1.73 M 2
GLOBULIN SER CALC-MCNC: 3.3 G/DL (ref 1.9–3.5)
GLUCOSE BLD STRIP.AUTO-MCNC: 130 MG/DL (ref 65–99)
GLUCOSE BLD STRIP.AUTO-MCNC: 145 MG/DL (ref 65–99)
GLUCOSE BLD STRIP.AUTO-MCNC: 154 MG/DL (ref 65–99)
GLUCOSE BLD STRIP.AUTO-MCNC: 175 MG/DL (ref 65–99)
GLUCOSE BLD STRIP.AUTO-MCNC: 183 MG/DL (ref 65–99)
GLUCOSE SERPL-MCNC: 188 MG/DL (ref 65–99)
HBA1C MFR BLD: 9.3 % (ref 4–5.6)
HCT VFR BLD AUTO: 39.9 % (ref 37–47)
HDLC SERPL-MCNC: 31 MG/DL
HGB BLD-MCNC: 12.5 G/DL (ref 12–16)
INR PPP: 1.68 (ref 0.87–1.13)
LDLC SERPL CALC-MCNC: 28 MG/DL
MAGNESIUM SERPL-MCNC: 1.7 MG/DL (ref 1.5–2.5)
MCH RBC QN AUTO: 26.4 PG (ref 27–33)
MCHC RBC AUTO-ENTMCNC: 31.3 G/DL (ref 32.2–35.5)
MCV RBC AUTO: 84.2 FL (ref 81.4–97.8)
PLATELET # BLD AUTO: 240 K/UL (ref 164–446)
PMV BLD AUTO: 9 FL (ref 9–12.9)
POTASSIUM SERPL-SCNC: 5.1 MMOL/L (ref 3.6–5.5)
PROT SERPL-MCNC: 6.3 G/DL (ref 6–8.2)
PROTHROMBIN TIME: 20.3 SEC (ref 12–14.6)
RBC # BLD AUTO: 4.74 M/UL (ref 4.2–5.4)
SODIUM SERPL-SCNC: 131 MMOL/L (ref 135–145)
TRIGL SERPL-MCNC: 130 MG/DL (ref 0–149)
WBC # BLD AUTO: 7 K/UL (ref 4.8–10.8)

## 2024-12-21 PROCEDURE — 36415 COLL VENOUS BLD VENIPUNCTURE: CPT

## 2024-12-21 PROCEDURE — 770001 HCHG ROOM/CARE - MED/SURG/GYN PRIV*

## 2024-12-21 PROCEDURE — A9270 NON-COVERED ITEM OR SERVICE: HCPCS | Performed by: HOSPITALIST

## 2024-12-21 PROCEDURE — 94760 N-INVAS EAR/PLS OXIMETRY 1: CPT

## 2024-12-21 PROCEDURE — 700111 HCHG RX REV CODE 636 W/ 250 OVERRIDE (IP): Performed by: INTERNAL MEDICINE

## 2024-12-21 PROCEDURE — 83735 ASSAY OF MAGNESIUM: CPT

## 2024-12-21 PROCEDURE — 96366 THER/PROPH/DIAG IV INF ADDON: CPT

## 2024-12-21 PROCEDURE — 80053 COMPREHEN METABOLIC PANEL: CPT

## 2024-12-21 PROCEDURE — 99407 BEHAV CHNG SMOKING > 10 MIN: CPT | Performed by: INTERNAL MEDICINE

## 2024-12-21 PROCEDURE — 96365 THER/PROPH/DIAG IV INF INIT: CPT

## 2024-12-21 PROCEDURE — 83036 HEMOGLOBIN GLYCOSYLATED A1C: CPT

## 2024-12-21 PROCEDURE — 96372 THER/PROPH/DIAG INJ SC/IM: CPT

## 2024-12-21 PROCEDURE — 700102 HCHG RX REV CODE 250 W/ 637 OVERRIDE(OP): Performed by: HOSPITALIST

## 2024-12-21 PROCEDURE — 99233 SBSQ HOSP IP/OBS HIGH 50: CPT | Performed by: INTERNAL MEDICINE

## 2024-12-21 PROCEDURE — 80061 LIPID PANEL: CPT

## 2024-12-21 PROCEDURE — 85027 COMPLETE CBC AUTOMATED: CPT

## 2024-12-21 PROCEDURE — 99497 ADVNCD CARE PLAN 30 MIN: CPT | Performed by: INTERNAL MEDICINE

## 2024-12-21 PROCEDURE — 85610 PROTHROMBIN TIME: CPT

## 2024-12-21 PROCEDURE — 82962 GLUCOSE BLOOD TEST: CPT | Mod: 91

## 2024-12-21 PROCEDURE — 700105 HCHG RX REV CODE 258: Performed by: EMERGENCY MEDICINE

## 2024-12-21 RX ORDER — MAGNESIUM SULFATE HEPTAHYDRATE 40 MG/ML
2 INJECTION, SOLUTION INTRAVENOUS ONCE
Status: COMPLETED | OUTPATIENT
Start: 2024-12-21 | End: 2024-12-21

## 2024-12-21 RX ORDER — ENOXAPARIN SODIUM 150 MG/ML
120 INJECTION SUBCUTANEOUS EVERY 12 HOURS
Status: DISCONTINUED | OUTPATIENT
Start: 2024-12-22 | End: 2024-12-23 | Stop reason: HOSPADM

## 2024-12-21 RX ADMIN — MAGNESIUM SULFATE HEPTAHYDRATE 2 G: 2 INJECTION, SOLUTION INTRAVENOUS at 06:04

## 2024-12-21 RX ADMIN — ROSUVASTATIN CALCIUM 20 MG: 10 TABLET, FILM COATED ORAL at 17:09

## 2024-12-21 RX ADMIN — INSULIN LISPRO 1 UNITS: 100 INJECTION, SOLUTION INTRAVENOUS; SUBCUTANEOUS at 17:12

## 2024-12-21 RX ADMIN — WARFARIN SODIUM 7.5 MG: 7.5 TABLET ORAL at 17:10

## 2024-12-21 RX ADMIN — URSODIOL 300 MG: 300 CAPSULE ORAL at 17:10

## 2024-12-21 RX ADMIN — SODIUM CHLORIDE: 9 INJECTION, SOLUTION INTRAVENOUS at 17:33

## 2024-12-21 RX ADMIN — INSULIN LISPRO 1 UNITS: 100 INJECTION, SOLUTION INTRAVENOUS; SUBCUTANEOUS at 21:17

## 2024-12-21 RX ADMIN — INSULIN GLARGINE-YFGN 24 UNITS: 100 INJECTION, SOLUTION SUBCUTANEOUS at 08:15

## 2024-12-21 RX ADMIN — URSODIOL 300 MG: 300 CAPSULE ORAL at 06:14

## 2024-12-21 RX ADMIN — OXYCODONE 5 MG: 5 TABLET ORAL at 06:12

## 2024-12-21 ASSESSMENT — ENCOUNTER SYMPTOMS
BLURRED VISION: 0
FEVER: 0
ABDOMINAL PAIN: 1
DOUBLE VISION: 0
CHILLS: 0
DIZZINESS: 0
FALLS: 0
SHORTNESS OF BREATH: 0
HEARTBURN: 0
NERVOUS/ANXIOUS: 0
HEADACHES: 0
COUGH: 0
NAUSEA: 1
BACK PAIN: 0
PALPITATIONS: 0

## 2024-12-21 ASSESSMENT — FIBROSIS 4 INDEX: FIB4 SCORE: 0.98

## 2024-12-21 ASSESSMENT — PAIN DESCRIPTION - PAIN TYPE
TYPE: ACUTE PAIN

## 2024-12-21 ASSESSMENT — LIFESTYLE VARIABLES: SUBSTANCE_ABUSE: 0

## 2024-12-21 NOTE — ASSESSMENT & PLAN NOTE
12/21: I discussed with patient for at least 11 minutes tobacco abuse counseling.  The patient states that she has been smoking for at least 25 years initially she used to smoke a pack a day and right now she says she smokes 3 to 4 cigarettes a day.  The patient does not wish to have nicotine replacement therapy at this time.  We went over the benefits of quitting smoking.  She understands.  We discussed different methods including pharmacological options that we will help her quit.  She says that she will follow-up with PCP as an outpatient.

## 2024-12-21 NOTE — PROGRESS NOTES
Patient blood pressure 101/57. Held hydrochlorothiazide, spironolactone, and lisinopril per order parameters and re timed. Informed night MD. No new orders at this time.

## 2024-12-21 NOTE — PROGRESS NOTES
"Hospital Medicine Daily Progress Note    Date of Service  12/21/2024    Chief Complaint  Asha Younger is a 64 y.o. female admitted 12/20/2024 with abdominal pain.    Hospital Course  As per chart review:  \"64 y.o. female with a past medical history of protein C deficiency with recurrent deep vein thrombosis on anticoagulation with Coumadin, obesity with a BMI of 40, hyperlipidemia, diabetes who presented 12/20/2024 with abdominal pain.  Patient has not been feeling well over the past 1 week.  She has been having vomiting and worsening abdominal pain nausea and anorexia.  The pain is mostly located in the epigastric region.  She was seen at urgent care and was advised to go to the hospital for acute pancreatitis.\"    Interval Problem Update  12/21: Patient seen at bedside this morning.  Still complaining of abdominal pain, however she mentions that she has an appetite.  Will advance diet as tolerated continue IV fluids, continue pain management.  Continue to monitor closely.    I have discussed this patient's plan of care and discharge plan at IDT rounds today with Case Management, Nursing, Nursing leadership, and other members of the IDT team.    Consultants/Specialty  None for now.    Code Status  Full Code    Disposition  The patient is not medically cleared for discharge to home or a post-acute facility.        Review of Systems  Review of Systems   Constitutional:  Positive for malaise/fatigue. Negative for chills and fever.   HENT:  Negative for hearing loss and nosebleeds.    Eyes:  Negative for blurred vision and double vision.   Respiratory:  Negative for cough and shortness of breath.    Cardiovascular:  Negative for chest pain and palpitations.   Gastrointestinal:  Positive for abdominal pain and nausea. Negative for heartburn.   Genitourinary:  Negative for dysuria and urgency.   Musculoskeletal:  Negative for back pain and falls.   Skin:  Negative for itching and rash.   Neurological:  Negative for " dizziness and headaches.   Psychiatric/Behavioral:  Negative for substance abuse. The patient is not nervous/anxious.    All other systems reviewed and are negative.       Physical Exam  Temp:  [36.2 °C (97.1 °F)-36.6 °C (97.8 °F)] 36.2 °C (97.1 °F)  Pulse:  [57-74] 74  Resp:  [16-18] 18  BP: ()/(57-73) 109/59  SpO2:  [89 %-95 %] 92 %    Physical Exam  Vitals and nursing note reviewed.   Constitutional:       General: She is not in acute distress.     Appearance: She is obese. She is ill-appearing.   HENT:      Head: Normocephalic and atraumatic.      Right Ear: External ear normal.      Left Ear: External ear normal.      Mouth/Throat:      Pharynx: No oropharyngeal exudate or posterior oropharyngeal erythema.   Eyes:      General:         Right eye: No discharge.         Left eye: No discharge.   Cardiovascular:      Rate and Rhythm: Normal rate and regular rhythm.      Pulses: Normal pulses.      Heart sounds: No murmur heard.     No gallop.   Pulmonary:      Effort: Pulmonary effort is normal. No respiratory distress.      Breath sounds: Normal breath sounds. No wheezing or rhonchi.   Abdominal:      General: Bowel sounds are normal. There is no distension.      Palpations: Abdomen is soft.      Tenderness: There is abdominal tenderness. There is no guarding.   Musculoskeletal:         General: No swelling or tenderness. Normal range of motion.      Cervical back: Normal range of motion and neck supple. No tenderness.   Skin:     General: Skin is warm and dry.   Neurological:      General: No focal deficit present.      Mental Status: She is alert and oriented to person, place, and time. Mental status is at baseline.      Motor: No weakness.   Psychiatric:         Mood and Affect: Mood normal.         Behavior: Behavior normal.         Fluids  No intake or output data in the 24 hours ending 12/21/24 1218     Laboratory  Recent Labs     12/19/24  1357 12/20/24  1151 12/21/24  0149   WBC 7.7 6.8 7.0   RBC  5.40 5.43* 4.74   HEMOGLOBIN 14.3 14.4 12.5   HEMATOCRIT 44.7 44.6 39.9   MCV 82.8 82.1 84.2   MCH 26.5* 26.5* 26.4*   MCHC 32.0* 32.3 31.3*   RDW 39.9 38.8 40.7   PLATELETCT 295 278 240   MPV 10.2 9.2 9.0     Recent Labs     12/19/24  1357 12/20/24  1151 12/21/24  0149   SODIUM 133* 132* 131*   POTASSIUM 5.0 5.2 5.1   CHLORIDE 99 98 101   CO2 25 25 22   GLUCOSE 140* 121* 188*   BUN 9 13 16   CREATININE 1.24 1.28 1.26   CALCIUM 10.3 9.8 9.2     Recent Labs     12/20/24  1403 12/21/24  0149   INR 1.47* 1.68*         Recent Labs     12/21/24  0149   TRIGLYCERIDE 130   HDL 31*   LDL 28       Imaging  No orders to display        Assessment/Plan  * Acute pancreatitis, unspecified complication status, unspecified pancreatitis type- (present on admission)  Assessment & Plan  AST, ALT, alkaline phosphatase and bilirubin within normal limits unlikely biliary etiology.  I will check a lipid profile, consider insulin drip/plasmapheresis if there is significant hypertriglyceridemia.  Supportive care, multimodal pain control.  Bowel rest.  Watch input and output closely, watch respiratory status closely  Watch closely for potential complications including pleural effusion, hypocalcemia, acute renal failure, compartment syndrome     12/21: Patient has an appetitie and is now eating GI soft diet. Cotinue IVF, monitor closely.        Hypomagnesemia  Assessment & Plan  Replace as needed  monitor    Acute kidney injury (HCC)- (present on admission)  Assessment & Plan  Mostly due to dehydration   Avoid / minimize nephrotoxins as much as possible.  Monitor inputs and outputs   I will order a UA, & urine electrolytes    12/21: Continue IVF    Hyponatremia- (present on admission)  Assessment & Plan  Mild  monitor    Dehydration- (present on admission)  Assessment & Plan  Likely secondary to reduced oral intake, and possibly early third spacing secondary to acute pancreatitis  I will hold home hydrochlorothiazide and spironolactone for  today with plan to restart tomorrow as long as dehydration has resolved  Encourage oral intake as tolerated, antiemetics as needed.  Intravenous hydration until adequate oral intake is achieved.     Protein C deficiency (HCC)- (present on admission)  Assessment & Plan  I resume home Coumadin    Morbid obesity with BMI of 40.0-44.9, adult (HCC)- (present on admission)  Assessment & Plan  At higher risk for atelectasis/hypoxia.  I will order continuous pulse oximetry  Emphasized incentive spirometry       History of recurrent deep vein thrombosis (DVT)- (present on admission)  Assessment & Plan  I resume home Coumadin     Dyslipidemia- (present on admission)  Assessment & Plan  Cardiac diet when able to take orally.  I will resume home rosuvastatin    GERD (gastroesophageal reflux disease)- (present on admission)  Assessment & Plan  I will start Tums as needed    Essential hypertension- (present on admission)  Assessment & Plan  BP on the lower side  Continue BP medications however with holding parameters    Type 2 diabetes mellitus with obesity (HCC)- (present on admission)  Assessment & Plan  With no significant hyperglycemia  I will check a glycated hemoglobin    I will start long & short acting insulin for now  I will order Accu-Checks, hypoglycemia protocol  Adjust according to blood sugars trend     Tobacco abuse counseling  Assessment & Plan  12/21: I discussed with patient for at least 11 minutes tobacco abuse counseling.  The patient states that she has been smoking for at least 25 years initially she used to smoke a pack a day and right now she says she smokes 3 to 4 cigarettes a day.  The patient does not wish to have nicotine replacement therapy at this time.  We went over the benefits of quitting smoking.  She understands.  We discussed different methods including pharmacological options that we will help her quit.  She says that she will follow-up with PCP as an outpatient.    Advance care  planning  Assessment & Plan  12/21: We discussed with patient for at least 16 minutes further goals of care.  This included CODE STATUS which includes full code 59.  The patient like to be full code.  We also discussed further treatment goals.  For now the patient like to have full treatment options.  This includes invasive or noninvasive procedures as needed.  In the event that the patient decisions for herself she would like her sister Cassandra to make decisions for her.         VTE prophylaxis: warfarin    I have performed a physical exam and reviewed and updated ROS and Plan today (12/21/2024). In review of yesterday's note (12/20/2024), there are no changes except as documented above.      I spend at least 51 minutes providing care for this patient.  This included face-to-face interview, physical examination.  Review of lab work including CBC, CMP, lipid panel.  Discussing with multidisciplinary team including case management, nursing staff and pharmacy.  Creating plan of care, reviewing orders.    Moreover, We discussed with patient for at least 16 minutes further goals of care.  This included CODE STATUS which includes full code 59.  The patient like to be full code.  We also discussed further treatment goals.  For now the patient like to have full treatment options.  This includes invasive or noninvasive procedures as needed.  In the event that the patient decisions for herself she would like her sister Cassandra to make decisions for her.    Moreover, I discussed with patient for at least 11 minutes tobacco abuse counseling.  The patient states that she has been smoking for at least 25 years initially she used to smoke a pack a day and right now she says she smokes 3 to 4 cigarettes a day.  The patient does not wish to have nicotine replacement therapy at this time.  We went over the benefits of quitting smoking.  She understands.  We discussed different methods including pharmacological options that we  will help her quit.  She says that she will follow-up with PCP as an outpatient.

## 2024-12-21 NOTE — ASSESSMENT & PLAN NOTE
12/22: I discussed with patient for at least 16 minutes further goals of care.  The patient was interested in filling out a POLST form.  We filled out a POLST form together.  The patient would like to have CPR attempted, the patient like to have full treatment options including mechanical ventilation, intubation, transferred to the ICU as needed.  Long-term artificial nutrition/feeding tube as needed.  The patient has decisional capacity.  The patient signed the POLST form.  I have given the POLST form to nursing to upload to the system.

## 2024-12-21 NOTE — CARE PLAN
Problem: Knowledge Deficit - Standard  Goal: Patient and family/care givers will demonstrate understanding of plan of care, disease process/condition, diagnostic tests and medications  Outcome: Progressing   The patient is Stable - Low risk of patient condition declining or worsening    Shift Goals  Clinical Goals: monitor pain, monitor oxygen saturation  Patient Goals: rest    Progress made toward(s) clinical / shift goals:  updated pt on plan of care, monitor pain. Wean oxygen as tolerated.     Patient is not progressing towards the following goals:

## 2024-12-21 NOTE — DIETARY
NUTRITION SERVICES: BMI - Pt with BMI >40 (=Body mass index is 40.74 kg/m².), morbid obesity. Weight loss counseling not appropriate in acute care setting.     RECOMMEND - If appropriate at DC please refer to outpatient nutrition services for weight management.

## 2024-12-21 NOTE — PROGRESS NOTES
Pt arrived to the floor from the ED via gurney. Ambulated from gurney to bed, accompanied by ER technician. Vitals taken. History, allergies, and med rec reviewed with patient and admission profile completed.     Pt oriented to unit and POC discussed.Welcome folder is available at bedside and reviewed with pt. Whiteboard has been updated and filled out appropriately. Pt instructed in call light use and encouraged to call for any questions, needs or concerns prior to getting out of bed. Patient declines any further needs at this time. Bed is locked and in lowest position. Patient refused bed alarm. Patient educated on importance.

## 2024-12-21 NOTE — CARE PLAN
The patient is Stable - Low risk of patient condition declining or worsening    Shift Goals  Clinical Goals: Monitor Pain, Monitor Vitals, Monitor Labs, Saftey  Patient Goals: Manage Pain and Rest    Progress made toward(s) clinical / shift goals:    Problem: Knowledge Deficit - Standard  Goal: Patient and family/care givers will demonstrate understanding of plan of care, disease process/condition, diagnostic tests and medications  Description: Target End Date:  1-3 days or as soon as patient condition allows    Document in Patient Education    1.  Patient and family/caregiver oriented to unit, equipment, visitation policy and means for communicating concern  2.  Complete/review Learning Assessment  3.  Assess knowledge level of disease process/condition, treatment plan, diagnostic tests and medications  4.  Explain disease process/condition, treatment plan, diagnostic tests and medications  Outcome: Progressing  Patient verbalized understanding of plan of care. All questions answered.     Problem: Fall Risk  Goal: Patient will remain free from falls  Description: Target End Date:  Prior to discharge or change in level of care    Document interventions on the Alvaradoashley Choudhury Fall Risk Assessment    1.  Assess for fall risk factors  2.  Implement fall precautions  Outcome: Progressing    Patient remained free from falls during shift. Bed locked in lowest position. Patient refused bed alarm.    Patient is not progressing towards the following goals:

## 2024-12-21 NOTE — PROGRESS NOTES
4 Eyes Skin Assessment Completed by ANITRA Moses and ANITRA Gonzalez.    Head WDL  Ears WDL  Nose WDL  Mouth WDL  Neck WDL  Breast/Chest WDL  Shoulder Blades WDL  Spine WDL  (R) Arm/Elbow/Hand WDL  (L) Arm/Elbow/Hand WDL  Abdomen Scar   Groin WDL  Scrotum/Coccyx/Buttocks WDL  (R) Leg Edema  (L) Leg Edema  (R) Heel/Foot/Toe Edema  (L) Heel/Foot/Toe Edema          Devices In Places Pulse Ox and Nasal Cannula      Interventions In Place NC W/Ear Foams    Possible Skin Injury No    Pictures Uploaded Into Epic N/A  Wound Consult Placed N/A  RN Wound Prevention Protocol Ordered No

## 2024-12-21 NOTE — PROGRESS NOTES
Inpatient Anticoagulation Service Note for 12/21/2024      Reason for Anticoagulation: Deep Vein Thrombosis (Protein C deficiency)        Hemoglobin Value: 12.5  Hematocrit Value: 39.9  Lab Platelet Value: 240  Target INR: 2.0 to 3.0    INR from last 7 days        Date/Time INR Value    12/21/24 0149 1.68     12/20/24 1403 1.47                   Dose from last 7 days        Date/Time Dose (mg)    12/21/24 1038 7.5     12/20/24 1517 5                   Average Dose (mg):  (home regimen: warfarin 5 mg Mo/We/Fr and 7.5 mg Tu/Th/Sa/Pierce)  Significant Interactions: Not Applicable  Bridge Therapy: No (If less than 5 days and overlap therapy discontinued -- document reason (i.e. Bleed Risk))    (If still on overlap therapy, if No -- document reason (i.e. Bleed Risk))    Reversal Agent Administered: Not Applicable  Comments: Warfarin pt presents with  supratherapeutic INR. On warfarin at home for recurrent DVT's and history of protein C deficiency. Admitted with pancreatitis and has had loss of appetite.    Plan:  Warfarin 7.5 mg (consistent with home regimen) today. Pharmacy to trend INR daily until stable. If INR without trend higher tomorrow will give an increased dose vs home regimen.    Education Material Provided?: No (Chronic warfarin therapy.)    Pharmacist suggested discharge dosing: home regimen if stable with INR follow up within 48-72 hr of discharge.     Trey Cochran, PharmD

## 2024-12-22 ENCOUNTER — APPOINTMENT (OUTPATIENT)
Dept: RADIOLOGY | Facility: MEDICAL CENTER | Age: 64
DRG: 439 | End: 2024-12-22
Attending: INTERNAL MEDICINE
Payer: MEDICARE

## 2024-12-22 PROBLEM — R60.0 LOWER EXTREMITY EDEMA: Status: ACTIVE | Noted: 2024-12-22

## 2024-12-22 LAB
ALBUMIN SERPL BCP-MCNC: 3.1 G/DL (ref 3.2–4.9)
ALBUMIN/GLOB SERPL: 1 G/DL
ALP SERPL-CCNC: 62 U/L (ref 30–99)
ALT SERPL-CCNC: 12 U/L (ref 2–50)
ANION GAP SERPL CALC-SCNC: 10 MMOL/L (ref 7–16)
AST SERPL-CCNC: 12 U/L (ref 12–45)
BILIRUB SERPL-MCNC: 0.2 MG/DL (ref 0.1–1.5)
BUN SERPL-MCNC: 16 MG/DL (ref 8–22)
CALCIUM ALBUM COR SERPL-MCNC: 9.6 MG/DL (ref 8.5–10.5)
CALCIUM SERPL-MCNC: 8.9 MG/DL (ref 8.4–10.2)
CHLORIDE SERPL-SCNC: 99 MMOL/L (ref 96–112)
CO2 SERPL-SCNC: 22 MMOL/L (ref 20–33)
CREAT SERPL-MCNC: 1.11 MG/DL (ref 0.5–1.4)
ERYTHROCYTE [DISTWIDTH] IN BLOOD BY AUTOMATED COUNT: 40.3 FL (ref 35.9–50)
GFR SERPLBLD CREATININE-BSD FMLA CKD-EPI: 55 ML/MIN/1.73 M 2
GLOBULIN SER CALC-MCNC: 3 G/DL (ref 1.9–3.5)
GLUCOSE BLD STRIP.AUTO-MCNC: 127 MG/DL (ref 65–99)
GLUCOSE BLD STRIP.AUTO-MCNC: 177 MG/DL (ref 65–99)
GLUCOSE BLD STRIP.AUTO-MCNC: 190 MG/DL (ref 65–99)
GLUCOSE BLD STRIP.AUTO-MCNC: 230 MG/DL (ref 65–99)
GLUCOSE BLD STRIP.AUTO-MCNC: 231 MG/DL (ref 65–99)
GLUCOSE SERPL-MCNC: 154 MG/DL (ref 65–99)
HCT VFR BLD AUTO: 37.4 % (ref 37–47)
HGB BLD-MCNC: 11.9 G/DL (ref 12–16)
INR PPP: 2.02 (ref 0.87–1.13)
MAGNESIUM SERPL-MCNC: 1.8 MG/DL (ref 1.5–2.5)
MCH RBC QN AUTO: 26.4 PG (ref 27–33)
MCHC RBC AUTO-ENTMCNC: 31.8 G/DL (ref 32.2–35.5)
MCV RBC AUTO: 82.9 FL (ref 81.4–97.8)
PLATELET # BLD AUTO: 251 K/UL (ref 164–446)
PMV BLD AUTO: 9.4 FL (ref 9–12.9)
POTASSIUM SERPL-SCNC: 5 MMOL/L (ref 3.6–5.5)
PROT SERPL-MCNC: 6.1 G/DL (ref 6–8.2)
PROTHROMBIN TIME: 23.5 SEC (ref 12–14.6)
RBC # BLD AUTO: 4.51 M/UL (ref 4.2–5.4)
SODIUM SERPL-SCNC: 131 MMOL/L (ref 135–145)
WBC # BLD AUTO: 5.8 K/UL (ref 4.8–10.8)

## 2024-12-22 PROCEDURE — 80053 COMPREHEN METABOLIC PANEL: CPT

## 2024-12-22 PROCEDURE — 700111 HCHG RX REV CODE 636 W/ 250 OVERRIDE (IP): Performed by: INTERNAL MEDICINE

## 2024-12-22 PROCEDURE — 94660 CPAP INITIATION&MGMT: CPT

## 2024-12-22 PROCEDURE — 770001 HCHG ROOM/CARE - MED/SURG/GYN PRIV*

## 2024-12-22 PROCEDURE — 94760 N-INVAS EAR/PLS OXIMETRY 1: CPT

## 2024-12-22 PROCEDURE — 99497 ADVNCD CARE PLAN 30 MIN: CPT | Performed by: INTERNAL MEDICINE

## 2024-12-22 PROCEDURE — 700105 HCHG RX REV CODE 258: Performed by: EMERGENCY MEDICINE

## 2024-12-22 PROCEDURE — 83735 ASSAY OF MAGNESIUM: CPT

## 2024-12-22 PROCEDURE — 82962 GLUCOSE BLOOD TEST: CPT | Mod: 91

## 2024-12-22 PROCEDURE — 700102 HCHG RX REV CODE 250 W/ 637 OVERRIDE(OP): Performed by: HOSPITALIST

## 2024-12-22 PROCEDURE — 99233 SBSQ HOSP IP/OBS HIGH 50: CPT | Performed by: INTERNAL MEDICINE

## 2024-12-22 PROCEDURE — 85610 PROTHROMBIN TIME: CPT

## 2024-12-22 PROCEDURE — A9270 NON-COVERED ITEM OR SERVICE: HCPCS | Performed by: HOSPITALIST

## 2024-12-22 PROCEDURE — 36415 COLL VENOUS BLD VENIPUNCTURE: CPT

## 2024-12-22 PROCEDURE — 93970 EXTREMITY STUDY: CPT

## 2024-12-22 PROCEDURE — 85027 COMPLETE CBC AUTOMATED: CPT

## 2024-12-22 RX ORDER — MAGNESIUM SULFATE HEPTAHYDRATE 40 MG/ML
2 INJECTION, SOLUTION INTRAVENOUS ONCE
Status: COMPLETED | OUTPATIENT
Start: 2024-12-22 | End: 2024-12-22

## 2024-12-22 RX ADMIN — URSODIOL 300 MG: 300 CAPSULE ORAL at 17:14

## 2024-12-22 RX ADMIN — INSULIN GLARGINE-YFGN 24 UNITS: 100 INJECTION, SOLUTION SUBCUTANEOUS at 07:58

## 2024-12-22 RX ADMIN — HYDROCHLOROTHIAZIDE 25 MG: 25 TABLET ORAL at 17:14

## 2024-12-22 RX ADMIN — OXYCODONE 5 MG: 5 TABLET ORAL at 13:10

## 2024-12-22 RX ADMIN — URSODIOL 300 MG: 300 CAPSULE ORAL at 05:59

## 2024-12-22 RX ADMIN — ROSUVASTATIN CALCIUM 20 MG: 10 TABLET, FILM COATED ORAL at 17:14

## 2024-12-22 RX ADMIN — WARFARIN SODIUM 7.5 MG: 7.5 TABLET ORAL at 17:14

## 2024-12-22 RX ADMIN — SODIUM CHLORIDE: 9 INJECTION, SOLUTION INTRAVENOUS at 01:39

## 2024-12-22 RX ADMIN — INSULIN LISPRO 2 UNITS: 100 INJECTION, SOLUTION INTRAVENOUS; SUBCUTANEOUS at 21:07

## 2024-12-22 RX ADMIN — INSULIN LISPRO 1 UNITS: 100 INJECTION, SOLUTION INTRAVENOUS; SUBCUTANEOUS at 11:30

## 2024-12-22 RX ADMIN — ENOXAPARIN SODIUM 120 MG: 150 INJECTION SUBCUTANEOUS at 00:13

## 2024-12-22 RX ADMIN — LISINOPRIL 40 MG: 20 TABLET ORAL at 06:00

## 2024-12-22 RX ADMIN — MAGNESIUM SULFATE HEPTAHYDRATE 2 G: 2 INJECTION, SOLUTION INTRAVENOUS at 05:57

## 2024-12-22 RX ADMIN — INSULIN LISPRO 1 UNITS: 100 INJECTION, SOLUTION INTRAVENOUS; SUBCUTANEOUS at 17:15

## 2024-12-22 RX ADMIN — CARVEDILOL 25 MG: 25 TABLET, FILM COATED ORAL at 17:14

## 2024-12-22 ASSESSMENT — ENCOUNTER SYMPTOMS
DOUBLE VISION: 0
PALPITATIONS: 0
DIZZINESS: 0
HEARTBURN: 0
FALLS: 0
ABDOMINAL PAIN: 1
NAUSEA: 1
CHILLS: 0
BLURRED VISION: 0
NERVOUS/ANXIOUS: 0
BACK PAIN: 0
FEVER: 0
COUGH: 0
HEADACHES: 0
SHORTNESS OF BREATH: 0

## 2024-12-22 ASSESSMENT — LIFESTYLE VARIABLES: SUBSTANCE_ABUSE: 0

## 2024-12-22 ASSESSMENT — PATIENT HEALTH QUESTIONNAIRE - PHQ9
SUM OF ALL RESPONSES TO PHQ9 QUESTIONS 1 AND 2: 0
1. LITTLE INTEREST OR PLEASURE IN DOING THINGS: NOT AT ALL
1. LITTLE INTEREST OR PLEASURE IN DOING THINGS: NOT AT ALL
2. FEELING DOWN, DEPRESSED, IRRITABLE, OR HOPELESS: NOT AT ALL
SUM OF ALL RESPONSES TO PHQ9 QUESTIONS 1 AND 2: 0

## 2024-12-22 ASSESSMENT — PAIN DESCRIPTION - PAIN TYPE
TYPE: ACUTE PAIN
TYPE: ACUTE PAIN;CHRONIC PAIN

## 2024-12-22 NOTE — CARE PLAN
The patient is Watcher - Medium risk of patient condition declining or worsening    Shift Goals  Clinical Goals: Pt will have 5/10 or less pain with intervention  Patient Goals: Rest comfortably    Progress made toward(s) clinical / shift goals:  Monitor pain, can medicate per MAR.     Patient is not progressing towards the following goals:

## 2024-12-22 NOTE — PROGRESS NOTES
AM diuretics retimed per parameters. PM MD notified. No new orders at this time. Day RN notified.

## 2024-12-22 NOTE — ASSESSMENT & PLAN NOTE
L>R  Pending US  For now we are using lovenox to bridge, pending US.  Continue lovenox and warfarin for now.

## 2024-12-22 NOTE — CARE PLAN
The patient is Stable - Low risk of patient condition declining or worsening    Shift Goals  Clinical Goals: Monitor Pain, Monitor O2, Safety  Patient Goals: Rest    Progress made toward(s) clinical / shift goals:    Problem: Knowledge Deficit - Standard  Goal: Patient and family/care givers will demonstrate understanding of plan of care, disease process/condition, diagnostic tests and medications  Description: Target End Date:  1-3 days or as soon as patient condition allows    Document in Patient Education    1.  Patient and family/caregiver oriented to unit, equipment, visitation policy and means for communicating concern  2.  Complete/review Learning Assessment  3.  Assess knowledge level of disease process/condition, treatment plan, diagnostic tests and medications  4.  Explain disease process/condition, treatment plan, diagnostic tests and medications  Outcome: Progressing  Patient verbalized understanding of plan of care. All questions answered.      Problem: Fall Risk  Goal: Patient will remain free from falls  Description: Target End Date:  Prior to discharge or change in level of care    Document interventions on the Jenny Choudhury Fall Risk Assessment    1.  Assess for fall risk factors  2.  Implement fall precautions  Outcome: Progressing    Patient remained free from falls. Bed locked in lowest position. Bed alarm refused    Patient is not progressing towards the following goals:

## 2024-12-22 NOTE — PROGRESS NOTES
"Hospital Medicine Daily Progress Note    Date of Service  12/22/2024    Chief Complaint  Asha Younger is a 64 y.o. female admitted 12/20/2024 with abdominal pain.    Hospital Course  As per chart review:  \"64 y.o. female with a past medical history of protein C deficiency with recurrent deep vein thrombosis on anticoagulation with Coumadin, obesity with a BMI of 40, hyperlipidemia, diabetes who presented 12/20/2024 with abdominal pain.  Patient has not been feeling well over the past 1 week.  She has been having vomiting and worsening abdominal pain nausea and anorexia.  The pain is mostly located in the epigastric region.  She was seen at urgent care and was advised to go to the hospital for acute pancreatitis.\"    Interval Problem Update  12/21: Patient seen at bedside this morning.  Still complaining of abdominal pain, however she mentions that she has an appetite.  Will advance diet as tolerated continue IV fluids, continue pain management.  Continue to monitor closely.    12/22: Patient seen at bedside this morning.  Patient with lower extremity edema and there is concern that the left is more than the right.  We have ordered ultrasound of the lower extremity to rule out DVT.  Currently on Lovenox and warfarin.  Continue to monitor closely.  Continue IV fluids for pancreatitis.  Advance diet slowly.    I have discussed this patient's plan of care and discharge plan at IDT rounds today with Case Management, Nursing, Nursing leadership, and other members of the IDT team.    Consultants/Specialty  None for now.    Code Status  Full Code    Disposition  The patient is not medically cleared for discharge to home or a post-acute facility.        Review of Systems  Review of Systems   Constitutional:  Positive for malaise/fatigue. Negative for chills and fever.   HENT:  Negative for hearing loss and nosebleeds.    Eyes:  Negative for blurred vision and double vision.   Respiratory:  Negative for cough and " shortness of breath.    Cardiovascular:  Positive for leg swelling. Negative for chest pain and palpitations.   Gastrointestinal:  Positive for abdominal pain and nausea. Negative for heartburn.   Genitourinary:  Negative for dysuria and urgency.   Musculoskeletal:  Negative for back pain and falls.   Skin:  Negative for itching and rash.   Neurological:  Negative for dizziness and headaches.   Psychiatric/Behavioral:  Negative for substance abuse. The patient is not nervous/anxious.    All other systems reviewed and are negative.       Physical Exam  Temp:  [35.9 °C (96.7 °F)-36.8 °C (98.3 °F)] 36.4 °C (97.6 °F)  Pulse:  [57-73] 65  Resp:  [16-18] 18  BP: (105-128)/(58-86) 128/69  SpO2:  [89 %-94 %] 94 %    Physical Exam  Vitals and nursing note reviewed.   Constitutional:       General: She is not in acute distress.     Appearance: She is obese. She is ill-appearing.   HENT:      Head: Normocephalic and atraumatic.      Right Ear: External ear normal.      Left Ear: External ear normal.      Mouth/Throat:      Pharynx: No oropharyngeal exudate or posterior oropharyngeal erythema.   Eyes:      General:         Right eye: No discharge.         Left eye: No discharge.   Cardiovascular:      Rate and Rhythm: Normal rate and regular rhythm.      Pulses: Normal pulses.      Heart sounds: No murmur heard.     No gallop.   Pulmonary:      Effort: Pulmonary effort is normal. No respiratory distress.      Breath sounds: Normal breath sounds. No wheezing or rhonchi.   Abdominal:      General: Bowel sounds are normal. There is no distension.      Palpations: Abdomen is soft.      Tenderness: There is abdominal tenderness. There is no guarding.   Musculoskeletal:         General: No swelling or tenderness. Normal range of motion.      Cervical back: Normal range of motion and neck supple. No tenderness.      Right lower leg: Edema present.      Left lower leg: Edema present.   Skin:     General: Skin is warm and dry.    Neurological:      General: No focal deficit present.      Mental Status: She is alert and oriented to person, place, and time. Mental status is at baseline.      Motor: No weakness.   Psychiatric:         Mood and Affect: Mood normal.         Behavior: Behavior normal.         Fluids    Intake/Output Summary (Last 24 hours) at 12/22/2024 1222  Last data filed at 12/22/2024 0800  Gross per 24 hour   Intake 440 ml   Output --   Net 440 ml        Laboratory  Recent Labs     12/20/24  1151 12/21/24  0149 12/22/24  0131   WBC 6.8 7.0 5.8   RBC 5.43* 4.74 4.51   HEMOGLOBIN 14.4 12.5 11.9*   HEMATOCRIT 44.6 39.9 37.4   MCV 82.1 84.2 82.9   MCH 26.5* 26.4* 26.4*   MCHC 32.3 31.3* 31.8*   RDW 38.8 40.7 40.3   PLATELETCT 278 240 251   MPV 9.2 9.0 9.4     Recent Labs     12/20/24  1151 12/21/24  0149 12/22/24  0131   SODIUM 132* 131* 131*   POTASSIUM 5.2 5.1 5.0   CHLORIDE 98 101 99   CO2 25 22 22   GLUCOSE 121* 188* 154*   BUN 13 16 16   CREATININE 1.28 1.26 1.11   CALCIUM 9.8 9.2 8.9     Recent Labs     12/20/24  1403 12/21/24  0149 12/22/24  0131   INR 1.47* 1.68* 2.02*         Recent Labs     12/21/24  0149   TRIGLYCERIDE 130   HDL 31*   LDL 28       Imaging  US-EXTREMITY VENOUS LOWER BILAT    (Results Pending)        Assessment/Plan  * Acute pancreatitis, unspecified complication status, unspecified pancreatitis type- (present on admission)  Assessment & Plan  AST, ALT, alkaline phosphatase and bilirubin within normal limits unlikely biliary etiology.  I will check a lipid profile, consider insulin drip/plasmapheresis if there is significant hypertriglyceridemia.  Supportive care, multimodal pain control.  Bowel rest.  Watch input and output closely, watch respiratory status closely  Watch closely for potential complications including pleural effusion, hypocalcemia, acute renal failure, compartment syndrome     12/22: Cotinue IVF, monitor closely.  Advance diet as tolerated slowly.        Lower extremity  edema  Assessment & Plan  L>R  Pending US  For now we are using lovenox to bridge, pending US.  Continue lovenox and warfarin for now.    Hypomagnesemia  Assessment & Plan  Replace as needed  monitor    Acute kidney injury (HCC)- (present on admission)  Assessment & Plan  Mostly due to dehydration   Avoid / minimize nephrotoxins as much as possible.  Monitor inputs and outputs   I will order a UA, & urine electrolytes    12/22: Cr today is 1.11. Continue IVF    Hyponatremia- (present on admission)  Assessment & Plan  Mild  monitor    Dehydration- (present on admission)  Assessment & Plan  Likely secondary to reduced oral intake, and possibly early third spacing secondary to acute pancreatitis  I will hold home hydrochlorothiazide and spironolactone for today with plan to restart tomorrow as long as dehydration has resolved  Encourage oral intake as tolerated, antiemetics as needed.  Intravenous hydration until adequate oral intake is achieved.     Protein C deficiency (HCC)- (present on admission)  Assessment & Plan  12/22: Continue lovenox and warfarin for now, pending US of LE    Morbid obesity with BMI of 40.0-44.9, adult (HCC)- (present on admission)  Assessment & Plan  At higher risk for atelectasis/hypoxia.  I will order continuous pulse oximetry  Emphasized incentive spirometry       History of recurrent deep vein thrombosis (DVT)- (present on admission)  Assessment & Plan  12/22: Currently on Coumadin and Lovenox, pending ultrasound of the lower extremities.    MISAEL (obstructive sleep apnea)- (present on admission)  Assessment & Plan  Nocturnal CPAP    Dyslipidemia- (present on admission)  Assessment & Plan  Cardiac diet when able to take orally.  I will resume home rosuvastatin    GERD (gastroesophageal reflux disease)- (present on admission)  Assessment & Plan  I will start Tums as needed    Essential hypertension- (present on admission)  Assessment & Plan  BP on the lower side  Continue BP medications  however with holding parameters    Type 2 diabetes mellitus with obesity (HCC)- (present on admission)  Assessment & Plan  With no significant hyperglycemia  I will check a glycated hemoglobin    I will start long & short acting insulin for now  I will order Accu-Checks, hypoglycemia protocol  Adjust according to blood sugars trend     Tobacco abuse counseling  Assessment & Plan  12/21: I discussed with patient for at least 11 minutes tobacco abuse counseling.  The patient states that she has been smoking for at least 25 years initially she used to smoke a pack a day and right now she says she smokes 3 to 4 cigarettes a day.  The patient does not wish to have nicotine replacement therapy at this time.  We went over the benefits of quitting smoking.  She understands.  We discussed different methods including pharmacological options that we will help her quit.  She says that she will follow-up with PCP as an outpatient.    Advance care planning  Assessment & Plan  12/22: I discussed with patient for at least 16 minutes further goals of care.  The patient was interested in filling out a POLST form.  We filled out a POLST form together.  The patient would like to have CPR attempted, the patient like to have full treatment options including mechanical ventilation, intubation, transferred to the ICU as needed.  Long-term artificial nutrition/feeding tube as needed.  The patient has decisional capacity.  The patient signed the POLST form.  I have given the POLST form to nursing to upload to the system.         VTE prophylaxis: warfarin and lovenox    I have performed a physical exam and reviewed and updated ROS and Plan today (12/22/2024). In review of yesterday's note (12/21/2024), there are no changes except as documented above.      I spend at least 52 minutes providing care for this patient.  This included face-to-face interview, physical examination.  Review of lab work including CBC, CMP, magnesium.  Discussing with  multidisciplinary team including case management, nursing staff and pharmacy.  Creating plan of care, reviewing orders.    Moreover, I discussed with patient for at least 16 minutes further goals of care.  The patient was interested in filling out a POLST form.  We filled out a POLST form together.  The patient would like to have CPR attempted, the patient like to have full treatment options including mechanical ventilation, intubation, transferred to the ICU as needed.  Long-term artificial nutrition/feeding tube as needed.  The patient has decisional capacity.  The patient signed the POLST form.  I have given the POLST form to nursing to upload to the system.

## 2024-12-22 NOTE — PROGRESS NOTES
Cross coverage note  I received page about patient's having left leg more swollen than the right, and the question whether DVT ultrasound should be performed to rule out acute DVT.  I ordered therapeutic dose of Lovenox subcu to bridge to therapeutic INR.

## 2024-12-22 NOTE — PROGRESS NOTES
Inpatient Anticoagulation Service Note for 12/22/2024      Reason for Anticoagulation: Coagulation disorder, Deep Vein Thrombosis (Protein C deficiency)           Hemoglobin Value: (!) 11.9  Hematocrit Value: 37.4  Lab Platelet Value: 251  Target INR: 2.0 to 3.0    INR from last 7 days        Date/Time INR Value    12/22/24 0131 2.02     12/21/24 0149 1.68     12/20/24 1403 1.47                   Dose from last 7 days        Date/Time Dose (mg)    12/22/24 1327 7.5     12/21/24 1038 7.5     12/20/24 1517 5                   Average Dose (mg):  (home regimen: warfarin 5 mg Mo/We/Fr and 7.5 mg Tu/Th/Sa/Pierce)  Significant Interactions: Not Applicable  Bridge Therapy: Yes  Bridge Therapy Start Date: 12/22/24  Days of Overlap Therapy: 0 (If less than 5 days and overlap therapy discontinued -- document reason (i.e. Bleed Risk))    (If still on overlap therapy, if No -- document reason (i.e. Bleed Risk))    Reversal Agent Administered: Not Applicable  Comments: awaiting results of ultrasound to determine if lovenox can be discontinued    Plan:  INR is therapeutic, But concern for new DVT, pt to get ultrasound today. Warfarin 7.5 mg today continue lovenox until results of ultrasound    Education Material Provided?: No (Chronic warfarin therapy.)    Pharmacist suggested discharge dosing: home regimen     Maureen Skelton, MaicolD

## 2024-12-22 NOTE — PROGRESS NOTES
MD notified of patient's swelling of lower extremities greater on left side. New order received for lovanox. Per MD no ultrasound at this time.

## 2024-12-22 NOTE — PROGRESS NOTES
Report received from NOC RN, assumed care of pt.   POC and medications reviewed with pt. Pt verbalized understanding.   AOx4  C/o mild abdominal pain at this time. Declines medication  Denies SOB, or dizziness at this time.   Safety measures in place.  Hourly rounding in place.

## 2024-12-22 NOTE — PROGRESS NOTES
Assumed care of patient at bedside report from day shift RN. Updated on POC. Patient currently A & O x 4; on 1 L O2 92 percent oxygen saturation; up self; with complaints of acute pain denies medication. Assessment completed. Call light within reach. Whiteboard updated. Fall precautions in place. Bed locked and in lowest position. All questions answered. No other needs indicated at this time.

## 2024-12-23 VITALS
TEMPERATURE: 97.6 F | DIASTOLIC BLOOD PRESSURE: 72 MMHG | BODY MASS INDEX: 42.6 KG/M2 | SYSTOLIC BLOOD PRESSURE: 121 MMHG | OXYGEN SATURATION: 90 % | HEART RATE: 66 BPM | WEIGHT: 271.39 LBS | RESPIRATION RATE: 18 BRPM | HEIGHT: 67 IN

## 2024-12-23 LAB
ALBUMIN SERPL BCP-MCNC: 3.3 G/DL (ref 3.2–4.9)
ALBUMIN/GLOB SERPL: 1.3 G/DL
ALP SERPL-CCNC: 63 U/L (ref 30–99)
ALT SERPL-CCNC: 12 U/L (ref 2–50)
ANION GAP SERPL CALC-SCNC: 7 MMOL/L (ref 7–16)
AST SERPL-CCNC: 12 U/L (ref 12–45)
BILIRUB SERPL-MCNC: 0.2 MG/DL (ref 0.1–1.5)
BUN SERPL-MCNC: 15 MG/DL (ref 8–22)
CALCIUM ALBUM COR SERPL-MCNC: 9.7 MG/DL (ref 8.5–10.5)
CALCIUM SERPL-MCNC: 9.1 MG/DL (ref 8.4–10.2)
CHLORIDE SERPL-SCNC: 99 MMOL/L (ref 96–112)
CO2 SERPL-SCNC: 25 MMOL/L (ref 20–33)
CREAT SERPL-MCNC: 1.21 MG/DL (ref 0.5–1.4)
ERYTHROCYTE [DISTWIDTH] IN BLOOD BY AUTOMATED COUNT: 40 FL (ref 35.9–50)
GFR SERPLBLD CREATININE-BSD FMLA CKD-EPI: 50 ML/MIN/1.73 M 2
GLOBULIN SER CALC-MCNC: 2.5 G/DL (ref 1.9–3.5)
GLUCOSE BLD STRIP.AUTO-MCNC: 160 MG/DL (ref 65–99)
GLUCOSE BLD STRIP.AUTO-MCNC: 193 MG/DL (ref 65–99)
GLUCOSE BLD STRIP.AUTO-MCNC: 233 MG/DL (ref 65–99)
GLUCOSE SERPL-MCNC: 175 MG/DL (ref 65–99)
HCT VFR BLD AUTO: 37 % (ref 37–47)
HGB BLD-MCNC: 11.8 G/DL (ref 12–16)
INR PPP: 1.96 (ref 0.87–1.13)
MAGNESIUM SERPL-MCNC: 1.8 MG/DL (ref 1.5–2.5)
MCH RBC QN AUTO: 26.7 PG (ref 27–33)
MCHC RBC AUTO-ENTMCNC: 31.9 G/DL (ref 32.2–35.5)
MCV RBC AUTO: 83.7 FL (ref 81.4–97.8)
PHOSPHATE SERPL-MCNC: 3.5 MG/DL (ref 2.5–4.5)
PLATELET # BLD AUTO: 231 K/UL (ref 164–446)
PMV BLD AUTO: 9.4 FL (ref 9–12.9)
POTASSIUM SERPL-SCNC: 5 MMOL/L (ref 3.6–5.5)
PROT SERPL-MCNC: 5.8 G/DL (ref 6–8.2)
PROTHROMBIN TIME: 23 SEC (ref 12–14.6)
RBC # BLD AUTO: 4.42 M/UL (ref 4.2–5.4)
SODIUM SERPL-SCNC: 131 MMOL/L (ref 135–145)
WBC # BLD AUTO: 5.4 K/UL (ref 4.8–10.8)

## 2024-12-23 PROCEDURE — 82962 GLUCOSE BLOOD TEST: CPT

## 2024-12-23 PROCEDURE — 99239 HOSP IP/OBS DSCHRG MGMT >30: CPT | Performed by: INTERNAL MEDICINE

## 2024-12-23 PROCEDURE — 700102 HCHG RX REV CODE 250 W/ 637 OVERRIDE(OP): Performed by: HOSPITALIST

## 2024-12-23 PROCEDURE — 85610 PROTHROMBIN TIME: CPT

## 2024-12-23 PROCEDURE — 700102 HCHG RX REV CODE 250 W/ 637 OVERRIDE(OP): Performed by: INTERNAL MEDICINE

## 2024-12-23 PROCEDURE — A9270 NON-COVERED ITEM OR SERVICE: HCPCS | Performed by: INTERNAL MEDICINE

## 2024-12-23 PROCEDURE — A9270 NON-COVERED ITEM OR SERVICE: HCPCS | Performed by: HOSPITALIST

## 2024-12-23 PROCEDURE — 84100 ASSAY OF PHOSPHORUS: CPT

## 2024-12-23 PROCEDURE — 80053 COMPREHEN METABOLIC PANEL: CPT

## 2024-12-23 PROCEDURE — 94660 CPAP INITIATION&MGMT: CPT

## 2024-12-23 PROCEDURE — 36415 COLL VENOUS BLD VENIPUNCTURE: CPT

## 2024-12-23 PROCEDURE — 83735 ASSAY OF MAGNESIUM: CPT

## 2024-12-23 PROCEDURE — 85027 COMPLETE CBC AUTOMATED: CPT

## 2024-12-23 PROCEDURE — 94760 N-INVAS EAR/PLS OXIMETRY 1: CPT

## 2024-12-23 PROCEDURE — 700111 HCHG RX REV CODE 636 W/ 250 OVERRIDE (IP): Performed by: INTERNAL MEDICINE

## 2024-12-23 RX ORDER — WARFARIN SODIUM 7.5 MG/1
7.5 TABLET ORAL ONCE
Status: COMPLETED | OUTPATIENT
Start: 2024-12-23 | End: 2024-12-23

## 2024-12-23 RX ADMIN — INSULIN LISPRO 1 UNITS: 100 INJECTION, SOLUTION INTRAVENOUS; SUBCUTANEOUS at 05:56

## 2024-12-23 RX ADMIN — LISINOPRIL 40 MG: 20 TABLET ORAL at 05:56

## 2024-12-23 RX ADMIN — WARFARIN SODIUM 7.5 MG: 7.5 TABLET ORAL at 12:04

## 2024-12-23 RX ADMIN — HYDROCHLOROTHIAZIDE 25 MG: 25 TABLET ORAL at 05:56

## 2024-12-23 RX ADMIN — ENOXAPARIN SODIUM 120 MG: 150 INJECTION SUBCUTANEOUS at 08:23

## 2024-12-23 RX ADMIN — URSODIOL 300 MG: 300 CAPSULE ORAL at 05:56

## 2024-12-23 RX ADMIN — INSULIN GLARGINE-YFGN 24 UNITS: 100 INJECTION, SOLUTION SUBCUTANEOUS at 08:24

## 2024-12-23 RX ADMIN — INSULIN LISPRO 1 UNITS: 100 INJECTION, SOLUTION INTRAVENOUS; SUBCUTANEOUS at 12:07

## 2024-12-23 RX ADMIN — CARVEDILOL 25 MG: 25 TABLET, FILM COATED ORAL at 08:22

## 2024-12-23 RX ADMIN — SPIRONOLACTONE 25 MG: 25 TABLET ORAL at 05:56

## 2024-12-23 ASSESSMENT — PAIN DESCRIPTION - PAIN TYPE: TYPE: ACUTE PAIN

## 2024-12-23 ASSESSMENT — FIBROSIS 4 INDEX: FIB4 SCORE: 0.97

## 2024-12-23 NOTE — RESPIRATORY CARE
I spoke with the patient about her NCPAP ,BiPAP in 2017 according to chart review. In the notes, it was documented that the patient needed a follow up for possible pressure increase adjustments and oxygen  added for NOC use. According to the patient this has not been followed up on. Patient says that she does have a Respironics unit at home. These units have been recalled. Last night the patient used our hospital unit, and ended up needing oxygen bleed in at 2 lpm to keep her saturations > 90%. This RT will voalte the MD with my concerns.

## 2024-12-23 NOTE — PROGRESS NOTES
"Educated pt on discharge instructions, rx medications and follow up with PCP, GI and pulmonary.  Pt voiced understanding . VS /72   Pulse 66   Temp 36.4 °C (97.6 °F) (Temporal)   Resp 18   Ht 1.702 m (5' 7\")   Wt 123 kg (271 lb 6.2 oz)   SpO2 90%   BMI 42.51 kg/m²    Patient alert and appropriate. All questions and concerns addressed. No further questions or concerns at this time. Copy of discharge paperwork provided.  Patient walked out of department in stable condition.  "

## 2024-12-23 NOTE — PROGRESS NOTES
Patient arrived in Lab - CCL4 Patient is a moderate fall risk.Patient educated.Patient refused bed alarm.Charge RN notified.

## 2024-12-23 NOTE — DISCHARGE PLANNING
Case Management Discharge Planning    Admission Date: 12/20/2024  GMLOS: 3  ALOS: 2    6-Clicks ADL Score: 24  6-Clicks Mobility Score: 24      Anticipated Discharge Dispo: Discharge Disposition: Discharged to home/self care (01)    Spoke to patient at bedside. Second IMM explained. Patient concurs with discharge. Patient requested copy of IMM, copy made and delivered to bedside. Original IMM copy faxed to [facility]. Patient reports she has a ride home. No further needs.

## 2024-12-23 NOTE — DISCHARGE INSTRUCTIONS
Please follow up with your Primary Care Physician and GI within the next few days.    Please keep a mild, soft diet for the next few days. Avoid alcohol     Please monitor your oxygen saturation at all times.  Come to the ER immediately if your oxygen saturation is below 90%.  Outpatient sleep study referral has been placed, please make an appointment with outpatient pulmonology for sleep study as soon as possible.

## 2024-12-23 NOTE — PROGRESS NOTES
Inpatient Anticoagulation Service Note for 12/23/2024      Reason for Anticoagulation: Deep Vein Thrombosis (protein C deficiency)           Hemoglobin Value: (!) 11.8  Hematocrit Value: 37  Lab Platelet Value: 231  Target INR: 2.0 to 3.0    INR from last 7 days        Date/Time INR Value    12/23/24 0111 1.96     12/22/24 0131 2.02     12/21/24 0149 1.68     12/20/24 1403 1.47                   Dose from last 7 days        Date/Time Dose (mg)    12/23/24 0949 7.5     12/22/24 1327 7.5     12/21/24 1038 7.5     12/20/24 1517 5                   Average Dose (mg):  (home regimen: warfarin 5 mg Mo/We/Fr and 7.5 mg Tu/Th/Sa/Pierce)  Significant Interactions: Not Applicable  Bridge Therapy: Yes  Bridge Therapy Start Date: 12/22/24  Days of Overlap Therapy: 1 (If less than 5 days and overlap therapy discontinued -- document reason (i.e. Bleed Risk))  INR Value Greater than 2 Prior to Discontinuation of Parenteral Anticoagulation: Yes (If still on overlap therapy, if No -- document reason (i.e. Bleed Risk))    Reversal Agent Administered: Not Applicable  Comments: lovenox started for LLE, DVT r/o will continue until INR >2.0. Higher dose given today x 1    Plan:  warfarin 7.5 mg today x 1 continue lovenox until INR >2.0  Education Material Provided?: No (Chronic warfarin therapy.)    Pharmacist suggested discharge dosing: home regimen, INR within 72 hours of discharge      Maureen Skelton, PharmD

## 2024-12-23 NOTE — CARE PLAN
Problem: Knowledge Deficit - Standard  Goal: Patient and family/care givers will demonstrate understanding of plan of care, disease process/condition, diagnostic tests and medications  Outcome: Progressing     Problem: Fall Risk  Goal: Patient will remain free from falls  Outcome: Progressing   The patient is Stable - Low risk of patient condition declining or worsening    Shift Goals  Clinical Goals: pain control < 4,remain free from falls on injuries,monitor glucose  Patient Goals: rest    Progress made toward(s) clinical / shift goals:  Pain at tolerable level.No falls sustained.    Patient is not progressing towards the following goals:

## 2024-12-23 NOTE — DISCHARGE SUMMARY
Discharge Summary    CHIEF COMPLAINT ON ADMISSION  Chief Complaint   Patient presents with    Sent from Urgent Care     Sent here from  whom she saw yesterday  had labs done and was told her pancreases is abnormal and kidney functions is off     Abdominal Pain     Started the last 5-6 days     Nausea     Some nausea      Loss of Appetite       Reason for Admission  Sent by ; Abnormal Labs; Abdomin*     Admission Date  12/20/2024    CODE STATUS  Full Code    HPI & HOSPITAL COURSE  Asha Younger is a 64 y.o. female admitted 12/20/2024 with abdominal pain, nausea, and anorexia.  She was seen at an urgent care center for acute pancreatitis. She has protein C deficiency with recurrent DVT (on Coumadin), HLD, DM2.     Abdominal ultrasound from 12/19/2024 showed diffuse hepatocellular disease, hepatomegaly, bilateral medical renal disease, normal gallbladder.  Bilateral lower extremity ultrasound from 12/22/2024 showed no evidence of DVT.    Patient is afebrile and hemodynamically stable. Abdominal pain has resolved. She is tolerating a regular diet  and is eager to go home. Patient has been advised to follow up with her PCP and GI physician in 2-3 days, or as soon as possible.     Patient required oxygen last night. She underwent oxygen evaluation this morning and maintained above 92% with ambulation. Outpatient pulmonology sleep study referral has been made.  Patient has been advised to follow-up with outpatient pulmonology as well as her primary care physician to expedite sleep study as soon as possible.  She has been advised to monitor her home oxygen saturation, and has been advised that should be above 92% at all times, and to come to the ER if her oxygen saturation is below 90%.     Therefore, she is discharged in fair and stable condition to home with close outpatient follow-up.      Discharge Date  12/23/2024    FOLLOW UP ITEMS POST DISCHARGE  PCP in 1-2 days    DISCHARGE DIAGNOSES  Principal Problem:     Acute pancreatitis, unspecified complication status, unspecified pancreatitis type (POA: Yes)  Active Problems:    Type 2 diabetes mellitus with obesity (HCC) (POA: Yes)      Overview: Replacing diagnoses that were inactivated after April regulatory import    Essential hypertension (POA: Yes)    GERD (gastroesophageal reflux disease) (POA: Yes)    Dyslipidemia (POA: Yes)    MISAEL (obstructive sleep apnea) (Chronic) (POA: Yes)    History of recurrent deep vein thrombosis (DVT) (POA: Yes)    Morbid obesity with BMI of 40.0-44.9, adult (HCC) (POA: Yes)    Protein C deficiency (HCC) (POA: Yes)    Dehydration (POA: Yes)    Hyponatremia (POA: Yes)    Acute kidney injury (HCC) (POA: Yes)    Hypomagnesemia (POA: Unknown)    Advance care planning (POA: Unknown)    Tobacco abuse counseling (POA: Unknown)    Lower extremity edema (POA: Unknown)  Resolved Problems:    * No resolved hospital problems. *      FOLLOW UP  No future appointments.  No follow-up provider specified.    MEDICATIONS ON DISCHARGE     Medication List        ASK your doctor about these medications        Instructions   albuterol 108 (90 Base) MCG/ACT Aers inhalation aerosol   Inhale 2 Puffs every 6 hours as needed for Shortness of Breath.  Dose: 2 Puff     BASAGLAR TEMPO PEN SC  Ask about: Which instructions should I use?   Inject 24-26 Units under the skin every morning.  Dose: 24-26 Units     carvedilol 25 MG Tabs  Commonly known as: Coreg   Take 25 mg by mouth 2 times a day, with meals.  Dose: 25 mg     fluticasone 50 MCG/ACT nasal spray  Commonly known as: Flonase   Spray 1 Spray in nose 1 time daily as needed. Each Nostril  Dose: 1 Spray     glipiZIDE 10 MG Tabs  Commonly known as: Glucotrol   Take 10 mg by mouth 3 times a day.  Dose: 10 mg     hydroCHLOROthiazide 25 MG Tabs   Take 25 mg by mouth 2 Times a Day.  Dose: 25 mg     Linzess 290 MCG Caps  Generic drug: linaCLOtide   Take 1 Capsule by mouth every day.  Dose: 1 Capsule     lisinopril 40 MG  tablet  Commonly known as: Prinivil   Take 40 mg by mouth every day.  Dose: 40 mg     Magnesium Oxide 250 MG Tabs   Take 250 mg by mouth 2 times a day.  Dose: 250 mg     metformin 1000 MG tablet  Commonly known as: Glucophage   Take 1,000 mg by mouth 2 times a day.  Dose: 1,000 mg     ondansetron 4 MG Tbdp  Commonly known as: Zofran ODT   Take 1 Tablet by mouth every 6 hours as needed for Nausea/Vomiting.  Dose: 4 mg     rosuvastatin 20 MG Tabs  Commonly known as: Crestor   Take 1 Tab by mouth every evening.  Dose: 20 mg     spironolactone 25 MG Tabs  Commonly known as: Aldactone   Take 1 Tablet by mouth every day. Please complete lab work for further refills.  Dose: 25 mg     ursodiol 300 MG Caps  Commonly known as: Actigall   Take 300 mg by mouth 2 times a day.  Dose: 300 mg     * warfarin 7.5 MG Tabs  Commonly known as: Coumadin  Ask about: Which instructions should I use?   Take 7.5 mg by mouth every evening. On Tuesday, Thursday, Saturday & Sunday  Dose: 7.5 mg     * warfarin 5 MG Tabs  Commonly known as: Coumadin  Ask about: Which instructions should I use?   Take 5 mg by mouth 3 times a week. Monday, Wednesday & Friday  Dose: 5 mg           * This list has 2 medication(s) that are the same as other medications prescribed for you. Read the directions carefully, and ask your doctor or other care provider to review them with you.                  Allergies  Allergies   Allergen Reactions    Erythromycin Itching and Nausea     Rxn - many years ago       DIET  Orders Placed This Encounter   Procedures    Diet Order Diet: Low Fiber(GI Soft); Second Modifier: (optional): Consistent CHO (Diabetic)     Standing Status:   Standing     Number of Occurrences:   1     Order Specific Question:   Diet:     Answer:   Low Fiber(GI Soft) [2]     Order Specific Question:   Second Modifier: (optional)     Answer:   Consistent CHO (Diabetic) [4]       ACTIVITY  As  tolerated.      CONSULTATIONS  None    PROCEDURES  None    LABORATORY  Lab Results   Component Value Date    SODIUM 131 (L) 12/23/2024    POTASSIUM 5.0 12/23/2024    CHLORIDE 99 12/23/2024    CO2 25 12/23/2024    GLUCOSE 175 (H) 12/23/2024    BUN 15 12/23/2024    CREATININE 1.21 12/23/2024    CREATININE 1.05 (H) 01/08/2013        Lab Results   Component Value Date    WBC 5.4 12/23/2024    HEMOGLOBIN 11.8 (L) 12/23/2024    HEMATOCRIT 37.0 12/23/2024    PLATELETCT 231 12/23/2024        Total time of the discharge process exceeds 36 minutes.

## 2024-12-23 NOTE — PROGRESS NOTES
Bedside report received from ANITRA Barry. Assumed care of patient. Daily plan of care discussed. Pt resting comfortably in bed with no signs of distress noted. Breathing even and unlabored.Patient reports pain level 2/10. Patient reports no further needs at this time. Call light within reach. Bed locked in lowest position. Plan of care on going.

## 2025-02-13 ENCOUNTER — APPOINTMENT (OUTPATIENT)
Dept: CARDIOLOGY | Facility: MEDICAL CENTER | Age: 65
End: 2025-02-13
Attending: NURSE PRACTITIONER
Payer: MEDICARE

## 2025-02-26 ENCOUNTER — APPOINTMENT (OUTPATIENT)
Dept: CARDIOLOGY | Facility: MEDICAL CENTER | Age: 65
End: 2025-02-26
Attending: NURSE PRACTITIONER
Payer: MEDICARE

## 2025-03-27 ENCOUNTER — TELEPHONE (OUTPATIENT)
Dept: HEALTH INFORMATION MANAGEMENT | Facility: OTHER | Age: 65
End: 2025-03-27
Payer: MEDICARE

## 2025-04-03 ENCOUNTER — OFFICE VISIT (OUTPATIENT)
Dept: CARDIOLOGY | Facility: MEDICAL CENTER | Age: 65
End: 2025-04-03
Attending: NURSE PRACTITIONER
Payer: MEDICARE

## 2025-04-03 VITALS
HEIGHT: 67 IN | SYSTOLIC BLOOD PRESSURE: 130 MMHG | OXYGEN SATURATION: 98 % | DIASTOLIC BLOOD PRESSURE: 60 MMHG | BODY MASS INDEX: 43.47 KG/M2 | WEIGHT: 277 LBS | HEART RATE: 75 BPM

## 2025-04-03 DIAGNOSIS — Z79.899 HIGH RISK MEDICATION USE: ICD-10-CM

## 2025-04-03 DIAGNOSIS — Q21.12 PFO WITH ATRIAL SEPTAL ANEURYSM: ICD-10-CM

## 2025-04-03 DIAGNOSIS — E11.69 DYSLIPIDEMIA ASSOCIATED WITH TYPE 2 DIABETES MELLITUS (HCC): ICD-10-CM

## 2025-04-03 DIAGNOSIS — I15.2 HYPERTENSION ASSOCIATED WITH DIABETES (HCC): ICD-10-CM

## 2025-04-03 DIAGNOSIS — G47.33 OSA (OBSTRUCTIVE SLEEP APNEA): ICD-10-CM

## 2025-04-03 DIAGNOSIS — F17.200 CURRENT SMOKER: ICD-10-CM

## 2025-04-03 DIAGNOSIS — I25.3 PFO WITH ATRIAL SEPTAL ANEURYSM: ICD-10-CM

## 2025-04-03 DIAGNOSIS — D68.59 HYPERCOAGULABLE STATE (HCC): ICD-10-CM

## 2025-04-03 DIAGNOSIS — E66.01 MORBID OBESITY WITH BMI OF 40.0-44.9, ADULT (HCC): ICD-10-CM

## 2025-04-03 DIAGNOSIS — E78.5 DYSLIPIDEMIA ASSOCIATED WITH TYPE 2 DIABETES MELLITUS (HCC): ICD-10-CM

## 2025-04-03 DIAGNOSIS — D68.59 PROTEIN C DEFICIENCY (HCC): ICD-10-CM

## 2025-04-03 DIAGNOSIS — E11.59 HYPERTENSION ASSOCIATED WITH DIABETES (HCC): ICD-10-CM

## 2025-04-03 PROCEDURE — 3075F SYST BP GE 130 - 139MM HG: CPT | Performed by: NURSE PRACTITIONER

## 2025-04-03 PROCEDURE — 99214 OFFICE O/P EST MOD 30 MIN: CPT | Performed by: NURSE PRACTITIONER

## 2025-04-03 PROCEDURE — 3078F DIAST BP <80 MM HG: CPT | Performed by: NURSE PRACTITIONER

## 2025-04-03 PROCEDURE — 99213 OFFICE O/P EST LOW 20 MIN: CPT | Performed by: NURSE PRACTITIONER

## 2025-04-03 ASSESSMENT — ENCOUNTER SYMPTOMS
ABDOMINAL PAIN: 0
PALPITATIONS: 0
PND: 0
MYALGIAS: 0
SHORTNESS OF BREATH: 0
DIZZINESS: 0
COUGH: 0
FEVER: 0
CLAUDICATION: 0
ORTHOPNEA: 0

## 2025-04-03 ASSESSMENT — FIBROSIS 4 INDEX: FIB4 SCORE: 0.97

## 2025-04-03 NOTE — PROGRESS NOTES
Chief Complaint   Patient presents with    Hypertension    Palpitations    Dyslipidemia       Subjective:   Asha Younger is a 64 y.o. female who presents today for follow-up on her HTN, left lower extremity edema.    Current patient of mine.  She was last seen in clinic on 8/7/2024 with myself.  During that visit, no changes were made to her regimen. She was sent for lab testing.     She continues to have some lower extremity edema.  She denies chest pain, palpitations, orthopnea, PND, shortness of breath or dizziness/lightheadedness.    She states she has been gaining weight because of trying to quit smoking.  She has been snacking instead of smoking.    She is trying to quit cold turkey, but currently does continue to smoke about 5 cigarettes/week.    Additionally, patient has the following medical problems:    -Clotting disorder: Protein C deficiency, taking warfarin, history of DVT     -Hypertension: Taking carvedilol, lisinopril, hydrochlorothiazide, furosemide    -Hyperlipidemia: Taking atorvastatin 20 mg    -Diabetes: Taking glipizide, metformin and mounjaro, followed by endocrinology    -Sleep apnea: does not tolerating CPAP    -Current smoker: Trying to quit smoking, currently smoking 3 cigarettes/day    -Chronic venous insufficiency    Past Medical History:   Diagnosis Date    Bronchitis     Clotting disorder (HCC)     Diabetes mellitus type 2 in obese 10/11/2011    DVT (deep venous thrombosis) (HCC) 10/11/2011    Edema 10/11/2011    GERD (gastroesophageal reflux disease) 10/11/2011    HTN (hypertension) 10/11/2011    Hypercoagulable state (HCC) 10/11/2011    Hyperlipemia 01/22/2013    Hypertension     Nasal drainage     Obesity     Palpitations 12/21/2011    Protein C deficiency (HCC)     Sickle cell trait ([facility])      Past Surgical History:   Procedure Laterality Date    OOPHORECTOMY  1984    RIGHT    LAPAROTOMY      VENTRAL HERNIA REPAIR       Family History   Problem Relation Age of Onset     Clotting Disorder Mother     Kidney Disease Mother     Cancer Mother         lung cancer    Hypertension Father     Cancer Father         Pancreatic  cancer    Heart Failure Father     Clotting Disorder Sister         blood clots, protein deficiency    Other Sister         joint problems    Hypertension Sister     Pulmonary Embolism Sister      Social History     Socioeconomic History    Marital status: Single     Spouse name: Not on file    Number of children: Not on file    Years of education: Not on file    Highest education level: Not on file   Occupational History    Not on file   Tobacco Use    Smoking status: Some Days     Current packs/day: 0.25     Average packs/day: 0.3 packs/day for 36.3 years (9.1 ttl pk-yrs)     Types: Cigarettes     Start date: 1989    Smokeless tobacco: Never    Tobacco comments:     3-4 Cigarettes a day, trying to quit   Vaping Use    Vaping status: Never Used   Substance and Sexual Activity    Alcohol use: No    Drug use: No    Sexual activity: Not on file   Other Topics Concern    Not on file   Social History Narrative    Not on file     Social Drivers of Health     Financial Resource Strain: Not on file   Food Insecurity: No Food Insecurity (12/20/2024)    Hunger Vital Sign     Worried About Running Out of Food in the Last Year: Never true     Ran Out of Food in the Last Year: Never true   Transportation Needs: No Transportation Needs (12/20/2024)    PRAPARE - Transportation     Lack of Transportation (Medical): No     Lack of Transportation (Non-Medical): No   Physical Activity: Not on file   Stress: Not on file   Social Connections: Not on file   Intimate Partner Violence: Not At Risk (12/20/2024)    Humiliation, Afraid, Rape, and Kick questionnaire     Fear of Current or Ex-Partner: No     Emotionally Abused: No     Physically Abused: No     Sexually Abused: No   Housing Stability: Low Risk  (12/20/2024)    Housing Stability Vital Sign     Unable to Pay for Housing in the Last  Year: No     Number of Times Moved in the Last Year: 0     Homeless in the Last Year: No     Allergies   Allergen Reactions    Erythromycin Itching and Nausea     Rxn - many years ago     Outpatient Encounter Medications as of 4/3/2025   Medication Sig Dispense Refill    warfarin (COUMADIN) 5 MG Tab Take 5 mg by mouth 3 times a week. Monday, Wednesday & Friday      albuterol 108 (90 Base) MCG/ACT Aero Soln inhalation aerosol Inhale 2 Puffs every 6 hours as needed for Shortness of Breath. 8.5 g 0    warfarin (COUMADIN) 7.5 MG Tab Take 7.5 mg by mouth every evening. On Tuesday, Thursday, Saturday & Sunday      LINZESS 290 MCG Cap Take 1 Capsule by mouth every day.      ursodiol (ACTIGALL) 300 MG Cap Take 300 mg by mouth 2 times a day.      Insulin Glargine (BASAGLAR TEMPO PEN SC) Inject 24-26 Units under the skin every morning.      spironolactone (ALDACTONE) 25 MG Tab Take 1 Tablet by mouth every day. Please complete lab work for further refills. 90 Tablet 3    rosuvastatin (CRESTOR) 20 MG Tab Take 1 Tab by mouth every evening. 90 Tab 3    carvedilol (COREG) 25 MG Tab Take 25 mg by mouth 2 times a day, with meals.      metformin (GLUCOPHAGE) 1000 MG tablet Take 1,000 mg by mouth 2 times a day.      hydrochlorothiazide (HYDRODIURIL) 25 MG Tab Take 25 mg by mouth 2 Times a Day.      lisinopril (PRINIVIL, ZESTRIL) 40 MG tablet Take 20 mg by mouth every day.      glipiZIDE (GLUCOTROL) 10 MG Tab Take 10 mg by mouth 3 times a day.      fluticasone (FLONASE) 50 MCG/ACT nasal spray Spray 1 Spray in nose 1 time daily as needed. Each Nostril       Magnesium Oxide 250 MG Tab Take 250 mg by mouth 2 times a day.      ondansetron (ZOFRAN ODT) 4 MG TABLET DISPERSIBLE Take 1 Tablet by mouth every 6 hours as needed for Nausea/Vomiting. 10 Tablet 0     No facility-administered encounter medications on file as of 4/3/2025.     Review of Systems   Constitutional:  Negative for fever and malaise/fatigue.   Respiratory:  Negative for  "cough and shortness of breath.    Cardiovascular:  Positive for leg swelling. Negative for chest pain, palpitations, orthopnea, claudication and PND.   Gastrointestinal:  Negative for abdominal pain.   Musculoskeletal:  Negative for myalgias.   Neurological:  Negative for dizziness.   All other systems reviewed and are negative.       Objective:   /60 (BP Location: Left arm, Patient Position: Sitting, BP Cuff Size: Adult)   Pulse 75   Ht 1.702 m (5' 7\")   Wt (!) 126 kg (277 lb)   SpO2 98%   BMI 43.38 kg/m²     Physical Exam  Constitutional:       Appearance: She is well-developed. She is morbidly obese.      Comments: BMI 42.26   HENT:      Head: Normocephalic and atraumatic.   Eyes:      Pupils: Pupils are equal, round, and reactive to light.   Neck:      Vascular: No JVD.   Cardiovascular:      Rate and Rhythm: Normal rate and regular rhythm.      Heart sounds: Normal heart sounds.   Pulmonary:      Effort: Pulmonary effort is normal. No respiratory distress.      Breath sounds: Normal breath sounds. No wheezing or rales.   Musculoskeletal:      Cervical back: Normal range of motion and neck supple.      Right lower leg: Edema (Trace) present.      Left lower leg: Edema (Trace) present.   Skin:     General: Skin is warm and dry.   Neurological:      Mental Status: She is alert and oriented to person, place, and time.   Psychiatric:         Behavior: Behavior normal.       Lab Results   Component Value Date/Time    CHOLSTRLTOT 85 (L) 12/21/2024 01:49 AM    LDL 28 12/21/2024 01:49 AM    HDL 31 (A) 12/21/2024 01:49 AM    TRIGLYCERIDE 130 12/21/2024 01:49 AM       Lab Results   Component Value Date/Time    SODIUM 131 (L) 12/23/2024 01:11 AM    POTASSIUM 5.0 12/23/2024 01:11 AM    CHLORIDE 99 12/23/2024 01:11 AM    CO2 25 12/23/2024 01:11 AM    GLUCOSE 175 (H) 12/23/2024 01:11 AM    BUN 15 12/23/2024 01:11 AM    CREATININE 1.21 12/23/2024 01:11 AM    CREATININE 1.05 (H) 01/08/2013 12:00 AM    BUNCREATRAT 11 " (L) 08/28/2024 07:54 AM    BUNCREATRAT 12 01/08/2013 12:00 AM     Lab Results   Component Value Date/Time    ALKPHOSPHAT 63 12/23/2024 01:11 AM    ASTSGOT 12 12/23/2024 01:11 AM    ALTSGPT 12 12/23/2024 01:11 AM    TBILIRUBIN 0.2 12/23/2024 01:11 AM     US of left LE 5/21/18 -results reviewed with patient  No left lower extremity deep venous thrombosis    Echo from 1/23/2019 in medical record    Transthoracic Echo Report 12/8/2023  No prior study is available for comparison.   The ejection fraction is measured to be 54 % by Castano's biplane.  Normal right ventricular size and systolic function.  Color Doppler evidence of PFO/ASD, aneurysmal interatrial septum.  No significant valvular disease.        Assessment:     1. PFO with atrial septal aneurysm        2. Hypertension associated with diabetes ([facility])        3. Dyslipidemia associated with type 2 diabetes mellitus ([facility])        4. MISAEL (obstructive sleep apnea)        5. Morbid obesity with BMI of 40.0-44.9, adult ([facility])        6. Hypercoagulable state ([facility])        7. High risk medication use        8. Protein C deficiency ([facility])        9. Current smoker                Medical Decision Making:  Today's Assessment / Status / Plan:   Evidence of PFO/ASD, aneurysmal intra-atrial septum seen on TTE:  -will pursue further testing if needed for symptoms    Hypertension: BP stable  -She mentions stable blood pressures at home  -Continue spironolactone 25 mg daily  -Continue carvedilol 25 mg twice daily  -Continue lisinopril 20 mg daily  -Continue hydrochlorothiazide 25 mg twice a day  -Monitor and log Blood pressures at home. Call office or RTC if BP increasing or >180/100 or if symptoms of elevated blood pressure present. Reviewed s/sx of stroke and heart attack. Patient to go to ER or call 911 if present.     Dyslipidemia:   -Last LDL 28 on 12/21/2024  -Continue rosuvastatin 20 mg daily  -Encouraged patient on complete smoking cessation    Current smoker:  -She is trying  to quit cold turkey  -Discussed if needed, she can use nicotine patches, gums or lozenges, at this time she will think about it, she can pick some up if she changes her mind    Protein C deficiency, history of DVT:  -Continue warfarin  -followed by PCP in [place]     History of lower extremity edema:  -Patient reports her swelling is stable, she will continue her current regimen, she does take her furosemide as needed, along with her spironolactone and HCTZ    Morbid obesity:  -BMI 43.38  -Recommend weight loss  -She mentions recent weight gain due to insulin use, she continues to follow with PCP.  She is also taking Mounjaro    Sleep apnea:  -Encouraged regular CPAP use, she continues to try to use regularly    High Risk Medication Use:  -[facility], will request labs  -Will continue to closely monitor for side effects of patient's high risk medication(s) including renal function, liver function NTproBNP/cardiac markers, and electrolytes as needed     FU in clinic in 6 month. Sooner if needed.    Patient verbalizes understanding and agrees with the plan of care.     PLEASE NOTE: This Note was created using voice recognition Software. I have made every reasonable attempt to correct obvious errors, but I expect that there are errors of grammar and possibly content that I did not discover before finalizing the note

## 2025-05-02 ENCOUNTER — TELEPHONE (OUTPATIENT)
Dept: CARDIOLOGY | Facility: MEDICAL CENTER | Age: 65
End: 2025-05-02
Payer: MEDICARE

## 2025-05-02 DIAGNOSIS — E11.69 DYSLIPIDEMIA ASSOCIATED WITH TYPE 2 DIABETES MELLITUS (HCC): ICD-10-CM

## 2025-05-02 DIAGNOSIS — I15.2 HYPERTENSION ASSOCIATED WITH DIABETES (HCC): ICD-10-CM

## 2025-05-02 DIAGNOSIS — E11.59 HYPERTENSION ASSOCIATED WITH DIABETES (HCC): ICD-10-CM

## 2025-05-02 DIAGNOSIS — Z79.899 HIGH RISK MEDICATION USE: ICD-10-CM

## 2025-05-02 DIAGNOSIS — E78.5 DYSLIPIDEMIA ASSOCIATED WITH TYPE 2 DIABETES MELLITUS (HCC): ICD-10-CM

## 2025-05-02 NOTE — TELEPHONE ENCOUNTER
----- Message from Nurse Practitioner MOIZ Cotton sent at 5/1/2025  2:05 PM PDT -----  Can you please let patient know that her lab tests were reviewed, her kidney function is down slightly, I would like her to repeat another CMP and lipid panel before her next visit.  Please have her also stay hydrated.    Please let me know if she has any other concerns.    Tea  ----- Message -----  From: Holly Gomez, Med Ass't  Sent: 4/7/2025   7:04 AM PDT  To: MOIZ Cotton    Labs scanned under media! Thank you!  ----- Message -----  From: MOIZ Cotton  Sent: 4/3/2025   7:31 PM PDT  To: Hollyjoel Gomez, Med Ass't    Could you request labs from [facility] for this patient she completed in March.  Thank you

## 2025-05-02 NOTE — PATIENT INSTRUCTIONS
Sleep Apnea   Sleep apnea is a sleep disorder characterized by abnormal pauses in breathing while you sleep. When your breathing pauses, the level of oxygen in your blood decreases. This causes you to move out of deep sleep and into light sleep. As a result, your quality of sleep is poor, and the system that carries your blood throughout your body (cardiovascular system) experiences stress. If sleep apnea remains untreated, the following conditions can develop:  · High blood pressure (hypertension).  · Coronary artery disease.  · Inability to achieve or maintain an erection (impotence).  · Impairment of your thought process (cognitive dysfunction).  There are three types of sleep apnea:  1. Obstructive sleep apnea--Pauses in breathing during sleep because of a blocked airway.  2. Central sleep apnea--Pauses in breathing during sleep because the area of the brain that controls your breathing does not send the correct signals to the muscles that control breathing.  3. Mixed sleep apnea--A combination of both obstructive and central sleep apnea.  RISK FACTORS  The following risk factors can increase your risk of developing sleep apnea:  · Being overweight.  · Smoking.  · Having narrow passages in your nose and throat.  · Being of older age.  · Being male.  · Alcohol use.  · Sedative and tranquilizer use.  · Ethnicity. Among individuals younger than 35 years,  Americans are at increased risk of sleep apnea.  SYMPTOMS   · Difficulty staying asleep.  · Daytime sleepiness and fatigue.  · Loss of energy.  · Irritability.  · Loud, heavy snoring.  · Morning headaches.  · Trouble concentrating.  · Forgetfulness.  · Decreased interest in sex.  · Unexplained sleepiness.  DIAGNOSIS   In order to diagnose sleep apnea, your caregiver will perform a physical examination. A sleep study done in the comfort of your own home may be appropriate if you are otherwise healthy. Your caregiver may also recommend that you spend the  "night in a sleep lab. In the sleep lab, several monitors record information about your heart, lungs, and brain while you sleep. Your leg and arm movements and blood oxygen level are also recorded.  TREATMENT  The following actions may help to resolve mild sleep apnea:  · Sleeping on your side.    · Using a decongestant if you have nasal congestion.    · Avoiding the use of depressants, including alcohol, sedatives, and narcotics.    · Losing weight and modifying your diet if you are overweight.  There also are devices and treatments to help open your airway:  · Oral appliances. These are custom-made mouthpieces that shift your lower jaw forward and slightly open your bite. This opens your airway.  · Devices that create positive airway pressure. This positive pressure \"splints\" your airway open to help you breathe better during sleep. The following devices create positive airway pressure:  ¨ Continuous positive airway pressure (CPAP) device. The CPAP device creates a continuous level of air pressure with an air pump. The air is delivered to your airway through a mask while you sleep. This continuous pressure keeps your airway open.  ¨ Nasal expiratory positive airway pressure (EPAP) device. The EPAP device creates positive air pressure as you exhale. The device consists of single-use valves, which are inserted into each nostril and held in place by adhesive. The valves create very little resistance when you inhale but create much more resistance when you exhale. That increased resistance creates the positive airway pressure. This positive pressure while you exhale keeps your airway open, making it easier to breath when you inhale again.  ¨ Bilevel positive airway pressure (BPAP) device. The BPAP device is used mainly in patients with central sleep apnea. This device is similar to the CPAP device because it also uses an air pump to deliver continuous air pressure through a mask. However, with the BPAP machine, the " pressure is set at two different levels. The pressure when you exhale is lower than the pressure when you inhale.  · Surgery. Typically, surgery is only done if you cannot comply with less invasive treatments or if the less invasive treatments do not improve your condition. Surgery involves removing excess tissue in your airway to create a wider passage way.     This information is not intended to replace advice given to you by your health care provider. Make sure you discuss any questions you have with your health care provider.     Document Released: 12/08/2003 Document Revised: 01/08/2016 Document Reviewed: 04/25/2013  [employer] Interactive Patient Education ©2016 [employer]  Smoking Cessation, Tips for Success  If you are ready to quit smoking, congratulations! You have chosen to help yourself be healthier. Cigarettes bring nicotine, tar, carbon monoxide, and other irritants into your body. Your lungs, heart, and blood vessels will be able to work better without these poisons. There are many different ways to quit smoking. Nicotine gum, nicotine patches, a nicotine inhaler, or nicotine nasal spray can help with physical craving. Hypnosis, support groups, and medicines help break the habit of smoking.  WHAT THINGS CAN I DO TO MAKE QUITTING EASIER?   Here are some tips to help you quit for good:  · Pick a date when you will quit smoking completely. Tell all of your friends and family about your plan to quit on that date.  · Do not try to slowly cut down on the number of cigarettes you are smoking. Pick a quit date and quit smoking completely starting on that day.  · Throw away all cigarettes.    · Clean and remove all ashtrays from your home, work, and car.  · On a card, write down your reasons for quitting. Carry the card with you and read it when you get the urge to smoke.  · Cleanse your body of nicotine. Drink enough water and fluids to keep your urine clear or pale yellow. Do this after quitting to flush  "the nicotine from your body.  · Learn to predict your moods. Do not let a bad situation be your excuse to have a cigarette. Some situations in your life might tempt you into wanting a cigarette.  · Never have \"just one\" cigarette. It leads to wanting another and another. Remind yourself of your decision to quit.  · Change habits associated with smoking. If you smoked while driving or when feeling stressed, try other activities to replace smoking. Stand up when drinking your coffee. Brush your teeth after eating. Sit in a different chair when you read the paper. Avoid alcohol while trying to quit, and try to drink fewer caffeinated beverages. Alcohol and caffeine may urge you to smoke.  · Avoid foods and drinks that can trigger a desire to smoke, such as sugary or spicy foods and alcohol.  · Ask people who smoke not to smoke around you.  · Have something planned to do right after eating or having a cup of coffee. For example, plan to take a walk or exercise.  · Try a relaxation exercise to calm you down and decrease your stress. Remember, you may be tense and nervous for the first 2 weeks after you quit, but this will pass.  · Find new activities to keep your hands busy. Play with a pen, coin, or rubber band. Doodle or draw things on paper.  · Brush your teeth right after eating. This will help cut down on the craving for the taste of tobacco after meals. You can also try mouthwash.    · Use oral substitutes in place of cigarettes. Try using lemon drops, carrots, cinnamon sticks, or chewing gum. Keep them handy so they are available when you have the urge to smoke.  · When you have the urge to smoke, try deep breathing.  · Designate your home as a nonsmoking area.  · If you are a heavy smoker, ask your health care provider about a prescription for nicotine chewing gum. It can ease your withdrawal from nicotine.  · Reward yourself. Set aside the cigarette money you save and buy yourself something nice.  · Look for " "support from others. Join a support group or smoking cessation program. Ask someone at home or at work to help you with your plan to quit smoking.  · Always ask yourself, \"Do I need this cigarette or is this just a reflex?\" Tell yourself, \"Today, I choose not to smoke,\" or \"I do not want to smoke.\" You are reminding yourself of your decision to quit.  · Do not replace cigarette smoking with electronic cigarettes (commonly called e-cigarettes). The safety of e-cigarettes is unknown, and some may contain harmful chemicals.  · If you relapse, do not give up! Plan ahead and think about what you will do the next time you get the urge to smoke.  HOW WILL I FEEL WHEN I QUIT SMOKING?  You may have symptoms of withdrawal because your body is used to nicotine (the addictive substance in cigarettes). You may crave cigarettes, be irritable, feel very hungry, cough often, get headaches, or have difficulty concentrating. The withdrawal symptoms are only temporary. They are strongest when you first quit but will go away within 10-14 days. When withdrawal symptoms occur, stay in control. Think about your reasons for quitting. Remind yourself that these are signs that your body is healing and getting used to being without cigarettes. Remember that withdrawal symptoms are easier to treat than the major diseases that smoking can cause.   Even after the withdrawal is over, expect periodic urges to smoke. However, these cravings are generally short lived and will go away whether you smoke or not. Do not smoke!  WHAT RESOURCES ARE AVAILABLE TO HELP ME QUIT SMOKING?  Your health care provider can direct you to community resources or hospitals for support, which may include:  · Group support.  · Education.  · Hypnosis.  · Therapy.     This information is not intended to replace advice given to you by your health care provider. Make sure you discuss any questions you have with your health care provider.     Document Released: 09/15/2005 " <-- Click to add NO significant Past Surgical History Document Revised: 01/08/2016 Document Reviewed: 06/05/2014  [employer] Interactive Patient Education ©2016 [employer]

## 2025-05-02 NOTE — TELEPHONE ENCOUNTER
Message  Received: Yesterday  MOIZ Cotton R.N.  Can you please let patient know that her lab tests were reviewed, her kidney function is down slightly, I would like her to repeat another CMP and lipid panel before her next visit.  Please have her also stay hydrated.    Please let me know if she has any other concerns.    Tea  -------------------------------------------------------------------  CMP lab order placed. [employer] message sent to pt.

## 2025-07-21 ENCOUNTER — OFFICE VISIT (OUTPATIENT)
Dept: URGENT CARE | Facility: PHYSICIAN GROUP | Age: 65
End: 2025-07-21
Payer: MEDICARE

## 2025-07-21 VITALS
HEIGHT: 67 IN | OXYGEN SATURATION: 97 % | HEART RATE: 73 BPM | RESPIRATION RATE: 19 BRPM | WEIGHT: 278.99 LBS | BODY MASS INDEX: 43.79 KG/M2 | DIASTOLIC BLOOD PRESSURE: 64 MMHG | SYSTOLIC BLOOD PRESSURE: 126 MMHG | TEMPERATURE: 97.2 F

## 2025-07-21 DIAGNOSIS — J01.90 ACUTE BACTERIAL SINUSITIS: Primary | ICD-10-CM

## 2025-07-21 DIAGNOSIS — B96.89 ACUTE BACTERIAL SINUSITIS: Primary | ICD-10-CM

## 2025-07-21 PROCEDURE — 99213 OFFICE O/P EST LOW 20 MIN: CPT

## 2025-07-21 PROCEDURE — 3074F SYST BP LT 130 MM HG: CPT

## 2025-07-21 PROCEDURE — 3078F DIAST BP <80 MM HG: CPT

## 2025-07-21 ASSESSMENT — ENCOUNTER SYMPTOMS
FEVER: 0
NAUSEA: 0
SHORTNESS OF BREATH: 0
SINUS PAIN: 1
CHILLS: 0
SORE THROAT: 0
HEADACHES: 0
VOMITING: 0
SINUS PRESSURE: 1
DIARRHEA: 0
ABDOMINAL PAIN: 0
COUGH: 1
MYALGIAS: 0

## 2025-07-21 ASSESSMENT — FIBROSIS 4 INDEX: FIB4 SCORE: 0.97

## 2025-07-21 NOTE — PROGRESS NOTES
"Subjective:   Asha Younger is a 64 y.o. female who presents for Sinus Problem (Sinus pressure/pain, nasal drainage and slight cough x 1 week. )      Sinus Problem  This is a new problem. The current episode started 1 to 4 weeks ago. The problem has been gradually worsening since onset. There has been no fever. Associated symptoms include congestion, coughing and sinus pressure. Pertinent negatives include no chills, ear pain, headaches, shortness of breath or sore throat. Past treatments include acetaminophen and nasal decongestants. The treatment provided no relief.       Review of Systems   Constitutional:  Negative for chills, fever and malaise/fatigue.   HENT:  Positive for congestion, sinus pressure and sinus pain (Left sided). Negative for ear pain, hearing loss and sore throat.    Respiratory:  Positive for cough. Negative for shortness of breath.    Cardiovascular:  Negative for chest pain.   Gastrointestinal:  Negative for abdominal pain, diarrhea, nausea and vomiting.   Genitourinary:  Negative for dysuria.   Musculoskeletal:  Negative for myalgias.   Skin:  Negative for rash.   Neurological:  Negative for headaches.       Medications, Allergies, and current problem list reviewed today in Epic.     Objective:     /64 (BP Location: Left arm, Patient Position: Sitting, BP Cuff Size: Adult long)   Pulse 73   Temp 36.2 °C (97.2 °F) (Temporal)   Resp 19   Ht 1.702 m (5' 7\")   Wt (!) 127 kg (278 lb 15.9 oz)   SpO2 97%     Physical Exam  Vitals and nursing note reviewed.   Constitutional:       General: She is not in acute distress.     Appearance: Normal appearance. She is not ill-appearing.   HENT:      Head: Normocephalic and atraumatic.      Right Ear: Tympanic membrane normal.      Left Ear: Tympanic membrane normal.      Nose: Congestion present.      Right Turbinates: Enlarged.      Left Turbinates: Enlarged. Not swollen.      Left Sinus: Maxillary sinus tenderness and frontal sinus " tenderness present.      Mouth/Throat:      Mouth: Mucous membranes are moist.   Eyes:      Conjunctiva/sclera: Conjunctivae normal.   Cardiovascular:      Rate and Rhythm: Normal rate.      Heart sounds: Normal heart sounds.   Pulmonary:      Effort: Pulmonary effort is normal. No respiratory distress.      Breath sounds: No stridor. No wheezing or rhonchi.   Abdominal:      General: Abdomen is flat.      Palpations: Abdomen is soft.   Musculoskeletal:         General: Normal range of motion.      Cervical back: Normal range of motion.   Skin:     General: Skin is warm and dry.      Capillary Refill: Capillary refill takes less than 2 seconds.   Neurological:      Mental Status: She is alert and oriented to person, place, and time.   Psychiatric:         Mood and Affect: Mood normal.         Behavior: Behavior normal.         Assessment/Plan:       1. Acute bacterial sinusitis  amoxicillin-clavulanate (AUGMENTIN) 875-125 MG Tab      After assessment patient does have noted sinusitis.  Patient at this time has been using multiple over-the-counter meds including Tylenol, nasal decongestions, and sinus rinses.  Patient instructed to continue use of these and was also provided prescription for Augmentin.  Patient instructed take as prescribed.  Patient instructed to monitor for any worsening signs or symptoms of any other concerns patient was instructed return to urgent care for reevaluation.    Differential diagnosis, natural history, and supportive care discussed. We also reviewed side effects of medication including allergic response, GI upset, tendon injury, rash, sedation etc. Patient and/or guardian voices understanding.      Advised the patient to follow-up with the primary care physician for recheck, reevaluation, and consideration of further management.    I personally reviewed prior external notes and test results pertinent to today's visit as well as additional imaging and testing completed in clinic today.      Please note that this dictation was created using voice recognition software. I have made every reasonable attempt to correct obvious errors, but I expect that there are errors of grammar and possibly content that I did not discover before finalizing the note.    This note was electronically signed by MOIZ Vanessa        intact